# Patient Record
Sex: FEMALE | Race: BLACK OR AFRICAN AMERICAN | Employment: UNEMPLOYED | URBAN - METROPOLITAN AREA
[De-identification: names, ages, dates, MRNs, and addresses within clinical notes are randomized per-mention and may not be internally consistent; named-entity substitution may affect disease eponyms.]

---

## 2019-11-26 ENCOUNTER — OFFICE VISIT (OUTPATIENT)
Dept: PALLATIVE CARE | Age: 54
End: 2019-11-26
Payer: MEDICAID

## 2019-11-26 VITALS
HEART RATE: 89 BPM | TEMPERATURE: 98.1 F | DIASTOLIC BLOOD PRESSURE: 72 MMHG | SYSTOLIC BLOOD PRESSURE: 125 MMHG | RESPIRATION RATE: 18 BRPM | BODY MASS INDEX: 29.79 KG/M2 | WEIGHT: 157.8 LBS | OXYGEN SATURATION: 95 % | HEIGHT: 61 IN

## 2019-11-26 DIAGNOSIS — R63.4 WEIGHT LOSS: ICD-10-CM

## 2019-11-26 DIAGNOSIS — G89.3 PAIN, NEOPLASM-RELATED: ICD-10-CM

## 2019-11-26 DIAGNOSIS — C49.4 MALIGNANT NEOPLASM OF CONNECTIVE AND SOFT TISSUE OF ABDOMEN (HCC): Primary | ICD-10-CM

## 2019-11-26 DIAGNOSIS — K59.00 CONSTIPATION, UNSPECIFIED CONSTIPATION TYPE: ICD-10-CM

## 2019-11-26 DIAGNOSIS — Z51.5 PALLIATIVE CARE ENCOUNTER: ICD-10-CM

## 2019-11-26 DIAGNOSIS — R06.02 SOB (SHORTNESS OF BREATH): ICD-10-CM

## 2019-11-26 PROCEDURE — 99205 OFFICE O/P NEW HI 60 MIN: CPT | Performed by: NURSE PRACTITIONER

## 2019-11-26 PROCEDURE — G8419 CALC BMI OUT NRM PARAM NOF/U: HCPCS | Performed by: NURSE PRACTITIONER

## 2019-11-26 PROCEDURE — 1036F TOBACCO NON-USER: CPT | Performed by: NURSE PRACTITIONER

## 2019-11-26 PROCEDURE — G8484 FLU IMMUNIZE NO ADMIN: HCPCS | Performed by: NURSE PRACTITIONER

## 2019-11-26 PROCEDURE — 3017F COLORECTAL CA SCREEN DOC REV: CPT | Performed by: NURSE PRACTITIONER

## 2019-11-26 PROCEDURE — G8427 DOCREV CUR MEDS BY ELIG CLIN: HCPCS | Performed by: NURSE PRACTITIONER

## 2019-11-26 RX ORDER — HYDROCODONE BITARTRATE AND ACETAMINOPHEN 5; 325 MG/1; MG/1
1 TABLET ORAL EVERY 6 HOURS PRN
Qty: 120 TABLET | Refills: 0 | Status: CANCELLED | OUTPATIENT
Start: 2019-11-26 | End: 2019-12-26

## 2019-11-26 RX ORDER — AMLODIPINE BESYLATE 10 MG/1
10 TABLET ORAL DAILY
COMMUNITY

## 2019-11-26 RX ORDER — GLIPIZIDE 5 MG/1
5 TABLET ORAL DAILY
COMMUNITY

## 2019-11-26 RX ORDER — DOCUSATE SODIUM 100 MG/1
100 CAPSULE, LIQUID FILLED ORAL 2 TIMES DAILY
Qty: 60 CAPSULE | Refills: 0 | Status: SHIPPED | OUTPATIENT
Start: 2019-11-26 | End: 2020-02-18

## 2019-11-26 RX ORDER — ERGOCALCIFEROL 1.25 MG/1
50000 CAPSULE ORAL WEEKLY
COMMUNITY
End: 2020-02-25

## 2019-11-26 RX ORDER — MIRTAZAPINE 15 MG/1
15 TABLET, FILM COATED ORAL NIGHTLY
Qty: 30 TABLET | Refills: 3 | Status: SHIPPED | OUTPATIENT
Start: 2019-11-26 | End: 2020-01-22 | Stop reason: SDUPTHER

## 2019-11-26 RX ORDER — HYDROCODONE BITARTRATE AND ACETAMINOPHEN 5; 325 MG/1; MG/1
1 TABLET ORAL 2 TIMES DAILY PRN
Qty: 14 TABLET | Refills: 0 | Status: SHIPPED | OUTPATIENT
Start: 2019-11-26 | End: 2019-12-03

## 2019-11-26 RX ORDER — HYDROCHLOROTHIAZIDE 25 MG/1
25 TABLET ORAL DAILY
COMMUNITY
End: 2021-05-20

## 2019-11-26 RX ORDER — LOSARTAN POTASSIUM 50 MG/1
50 TABLET ORAL DAILY
COMMUNITY

## 2019-11-26 ASSESSMENT — ENCOUNTER SYMPTOMS
SORE THROAT: 0
VOMITING: 0
WHEEZING: 0
ABDOMINAL PAIN: 0
BACK PAIN: 0
NAUSEA: 0
CHEST TIGHTNESS: 0
DIARRHEA: 0
CONSTIPATION: 1
SINUS PAIN: 0
TROUBLE SWALLOWING: 0
SHORTNESS OF BREATH: 1
COUGH: 0

## 2019-12-03 ENCOUNTER — OFFICE VISIT (OUTPATIENT)
Dept: PALLATIVE CARE | Age: 54
End: 2019-12-03
Payer: MEDICAID

## 2019-12-03 VITALS
DIASTOLIC BLOOD PRESSURE: 82 MMHG | BODY MASS INDEX: 29.63 KG/M2 | TEMPERATURE: 98.4 F | WEIGHT: 156.8 LBS | RESPIRATION RATE: 20 BRPM | HEART RATE: 86 BPM | OXYGEN SATURATION: 97 % | SYSTOLIC BLOOD PRESSURE: 139 MMHG

## 2019-12-03 DIAGNOSIS — Z51.5 PALLIATIVE CARE ENCOUNTER: ICD-10-CM

## 2019-12-03 DIAGNOSIS — R06.02 SOB (SHORTNESS OF BREATH): Primary | ICD-10-CM

## 2019-12-03 PROCEDURE — G8427 DOCREV CUR MEDS BY ELIG CLIN: HCPCS | Performed by: NURSE PRACTITIONER

## 2019-12-03 PROCEDURE — G8419 CALC BMI OUT NRM PARAM NOF/U: HCPCS | Performed by: NURSE PRACTITIONER

## 2019-12-03 PROCEDURE — 3017F COLORECTAL CA SCREEN DOC REV: CPT | Performed by: NURSE PRACTITIONER

## 2019-12-03 PROCEDURE — G8484 FLU IMMUNIZE NO ADMIN: HCPCS | Performed by: NURSE PRACTITIONER

## 2019-12-03 PROCEDURE — 1036F TOBACCO NON-USER: CPT | Performed by: NURSE PRACTITIONER

## 2019-12-03 PROCEDURE — 99214 OFFICE O/P EST MOD 30 MIN: CPT | Performed by: NURSE PRACTITIONER

## 2019-12-03 RX ORDER — DOXYCYCLINE HYCLATE 100 MG/1
100 CAPSULE ORAL 2 TIMES DAILY
Qty: 20 CAPSULE | Refills: 0 | Status: SHIPPED | OUTPATIENT
Start: 2019-12-03 | End: 2019-12-13

## 2019-12-03 RX ORDER — PREDNISONE 10 MG/1
10 TABLET ORAL DAILY
Qty: 30 TABLET | Refills: 0 | Status: SHIPPED | OUTPATIENT
Start: 2019-12-03 | End: 2019-12-15

## 2019-12-03 ASSESSMENT — ENCOUNTER SYMPTOMS
DIARRHEA: 0
CHEST TIGHTNESS: 0
SINUS PAIN: 0
COUGH: 1
TROUBLE SWALLOWING: 0
SHORTNESS OF BREATH: 1
VOMITING: 0
ABDOMINAL PAIN: 0
NAUSEA: 0
SORE THROAT: 0
BACK PAIN: 0
CONSTIPATION: 0
WHEEZING: 0

## 2019-12-10 ENCOUNTER — OFFICE VISIT (OUTPATIENT)
Dept: PALLATIVE CARE | Age: 54
End: 2019-12-10
Payer: MEDICAID

## 2019-12-10 VITALS
BODY MASS INDEX: 28.97 KG/M2 | WEIGHT: 153.3 LBS | TEMPERATURE: 98.1 F | DIASTOLIC BLOOD PRESSURE: 79 MMHG | SYSTOLIC BLOOD PRESSURE: 137 MMHG | HEART RATE: 84 BPM | OXYGEN SATURATION: 99 % | RESPIRATION RATE: 20 BRPM

## 2019-12-10 DIAGNOSIS — Z51.5 PALLIATIVE CARE ENCOUNTER: ICD-10-CM

## 2019-12-10 DIAGNOSIS — R06.02 SOB (SHORTNESS OF BREATH): Primary | ICD-10-CM

## 2019-12-10 DIAGNOSIS — C49.4 MALIGNANT NEOPLASM OF CONNECTIVE AND SOFT TISSUE OF ABDOMEN (HCC): ICD-10-CM

## 2019-12-10 DIAGNOSIS — R63.4 WEIGHT LOSS: ICD-10-CM

## 2019-12-10 DIAGNOSIS — G89.3 PAIN, NEOPLASM-RELATED: ICD-10-CM

## 2019-12-10 PROCEDURE — 99214 OFFICE O/P EST MOD 30 MIN: CPT | Performed by: NURSE PRACTITIONER

## 2019-12-10 PROCEDURE — G8484 FLU IMMUNIZE NO ADMIN: HCPCS | Performed by: NURSE PRACTITIONER

## 2019-12-10 PROCEDURE — 3017F COLORECTAL CA SCREEN DOC REV: CPT | Performed by: NURSE PRACTITIONER

## 2019-12-10 PROCEDURE — G8427 DOCREV CUR MEDS BY ELIG CLIN: HCPCS | Performed by: NURSE PRACTITIONER

## 2019-12-10 PROCEDURE — 1036F TOBACCO NON-USER: CPT | Performed by: NURSE PRACTITIONER

## 2019-12-10 PROCEDURE — G8419 CALC BMI OUT NRM PARAM NOF/U: HCPCS | Performed by: NURSE PRACTITIONER

## 2019-12-10 RX ORDER — HYDROCODONE BITARTRATE AND ACETAMINOPHEN 5; 325 MG/1; MG/1
1 TABLET ORAL 2 TIMES DAILY PRN
Qty: 28 TABLET | Refills: 0 | Status: SHIPPED | OUTPATIENT
Start: 2019-12-10 | End: 2019-12-24

## 2019-12-10 RX ORDER — POTASSIUM CHLORIDE 20 MEQ/1
20 TABLET, EXTENDED RELEASE ORAL
COMMUNITY
Start: 2019-09-06

## 2019-12-10 ASSESSMENT — ENCOUNTER SYMPTOMS
TROUBLE SWALLOWING: 0
SHORTNESS OF BREATH: 1
SORE THROAT: 0
NAUSEA: 0
BACK PAIN: 0
SINUS PAIN: 0
DIARRHEA: 0
COUGH: 1
VOMITING: 0
WHEEZING: 0
CHEST TIGHTNESS: 0
CONSTIPATION: 0
ABDOMINAL PAIN: 0

## 2019-12-31 ENCOUNTER — OFFICE VISIT (OUTPATIENT)
Dept: PALLATIVE CARE | Age: 54
End: 2019-12-31
Payer: MEDICAID

## 2019-12-31 VITALS
OXYGEN SATURATION: 97 % | BODY MASS INDEX: 28.91 KG/M2 | TEMPERATURE: 98.2 F | WEIGHT: 153 LBS | DIASTOLIC BLOOD PRESSURE: 80 MMHG | SYSTOLIC BLOOD PRESSURE: 144 MMHG | HEART RATE: 78 BPM | RESPIRATION RATE: 18 BRPM

## 2019-12-31 DIAGNOSIS — Z51.5 PALLIATIVE CARE ENCOUNTER: ICD-10-CM

## 2019-12-31 DIAGNOSIS — C49.4 MALIGNANT NEOPLASM OF CONNECTIVE AND SOFT TISSUE OF ABDOMEN (HCC): ICD-10-CM

## 2019-12-31 DIAGNOSIS — R06.02 SOB (SHORTNESS OF BREATH): ICD-10-CM

## 2019-12-31 DIAGNOSIS — G89.3 PAIN, NEOPLASM-RELATED: Primary | ICD-10-CM

## 2019-12-31 PROCEDURE — 99214 OFFICE O/P EST MOD 30 MIN: CPT | Performed by: NURSE PRACTITIONER

## 2019-12-31 PROCEDURE — G8419 CALC BMI OUT NRM PARAM NOF/U: HCPCS | Performed by: NURSE PRACTITIONER

## 2019-12-31 PROCEDURE — 1036F TOBACCO NON-USER: CPT | Performed by: NURSE PRACTITIONER

## 2019-12-31 PROCEDURE — 3017F COLORECTAL CA SCREEN DOC REV: CPT | Performed by: NURSE PRACTITIONER

## 2019-12-31 PROCEDURE — G8484 FLU IMMUNIZE NO ADMIN: HCPCS | Performed by: NURSE PRACTITIONER

## 2019-12-31 PROCEDURE — G8428 CUR MEDS NOT DOCUMENT: HCPCS | Performed by: NURSE PRACTITIONER

## 2019-12-31 RX ORDER — HYDROCODONE BITARTRATE AND ACETAMINOPHEN 5; 325 MG/1; MG/1
1 TABLET ORAL 2 TIMES DAILY PRN
Qty: 28 TABLET | Refills: 0 | Status: SHIPPED | OUTPATIENT
Start: 2019-12-31 | End: 2020-01-14

## 2020-01-16 ENCOUNTER — HOSPITAL ENCOUNTER (OUTPATIENT)
Dept: RADIATION ONCOLOGY | Age: 55
Discharge: HOME | End: 2020-01-16

## 2020-01-16 ENCOUNTER — HOSPITAL ENCOUNTER (OUTPATIENT)
Dept: RADIATION ONCOLOGY | Age: 55
End: 2020-01-16
Attending: RADIOLOGY

## 2020-01-16 VITALS
HEART RATE: 85 BPM | HEIGHT: 61 IN | RESPIRATION RATE: 16 BRPM | OXYGEN SATURATION: 97 % | SYSTOLIC BLOOD PRESSURE: 135 MMHG | TEMPERATURE: 97.2 F | WEIGHT: 149.8 LBS | DIASTOLIC BLOOD PRESSURE: 80 MMHG | BODY MASS INDEX: 28.28 KG/M2

## 2020-01-16 PROCEDURE — 77332 RADIATION TREATMENT AID(S): CPT | Performed by: RADIOLOGY

## 2020-01-16 PROCEDURE — 99214 OFFICE O/P EST MOD 30 MIN: CPT | Performed by: RADIOLOGY

## 2020-01-16 PROCEDURE — 77290 THER RAD SIMULAJ FIELD CPLX: CPT | Performed by: RADIOLOGY

## 2020-01-16 PROCEDURE — 77334 RADIATION TREATMENT AID(S): CPT | Performed by: RADIOLOGY

## 2020-01-16 RX ORDER — IPRATROPIUM BROMIDE AND ALBUTEROL SULFATE 2.5; .5 MG/3ML; MG/3ML
1 SOLUTION RESPIRATORY (INHALATION) EVERY 4 HOURS
COMMUNITY

## 2020-01-16 RX ORDER — HYDROCODONE BITARTRATE AND ACETAMINOPHEN 5; 325 MG/1; MG/1
1 TABLET ORAL 2 TIMES DAILY PRN
COMMUNITY
End: 2020-01-22 | Stop reason: SDUPTHER

## 2020-01-16 NOTE — PROGRESS NOTES
NADEGE referred to meet Pt via Dr. Krishna Garcia for possible transportation challenges. Pt has [employer]/Medicaid dual coverage. She is aware of transportation options through her insurance and how to connect to these resources (she recites need for 72 hour notice and possibility that the resources are capped at 15 rides). NADEGE encouraged her to contact [employer] and inform them that she is needing 30 treatments and request an extension on the maximum ride limit. NADEGE stated should the 15 ride max be enforced, a SW here at the cancer center will connect her to [facility] transportation dept. for the remainder of her rides. NADEGE inquired re: support system; here with fijuan today. Her finance brought her to this santiago't (he resides in the same building as herself) and Pt states he is able to bring her to the santiago'ts when chemotherapy and radiation are scheduled on the same day (once per week), so she does not have to arrange two transports for that day. She states her fiance and friends are a back-up for her transportation needs as she states adamantly that she does not want to miss any santiago'ts. Pt is no longer connected to [facility] (states a history of Bi-polar disorder, which she states has learned to manage without medications); ending approx. 5 years ago. There are no further needs at this time. NADEGE provided business card contact should future needs arise.

## 2020-01-16 NOTE — H&P
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 Current imaging (PETCT and MRI) brain indicate localized disease in the DARSHANA, inoperable due to location. Dear Dr Anaya Needs: here today for radiation recommendations    HPI: I was asked by Dr. Crystal Ch to see this 53 y.o. female who was found to have a rapidly enlarging mass on the anterior abdominal wall which was resected 6/11/19. Pathology showed poorly differentiated carcinoma with squamous features on 6/11/19. Pt presents today, >4 months later, after recent hospitalization at [facility] for chest pain. She denied SOB BRYANT weight loss and heoptysis. Initial CXR showed a L suprahilar mass and CT chest obtained on 11//19 showed an approximately 10cm L upper chest mass with associated encasement and narrowing of L hilar vessels and central airways c/w malignancy/bronchogenic carcinoma until proven otherwise. Small L pleural effusion. \" No evidence of metastatic disease on CT a/p except for trace nonspecific fluid in the pelvis.    CT angiogram 11/27/19:  Images Reviewed:  CT with contrast of the chest at an outside facility   performed on 11/27/2019 12:00 AM  Image Quality:  technically adequate images    Overread Date:   12/2/2019 9:10 AM  MQ:  Over_2B  Comparison:  None available      RESULT:    Limitations:  There is moderately good, but suboptimal opacification of   the pulmonary arterial vasculature.  Within this limitation --    Evaluation for thromboembolic disease:       - Right heart chambers:  No thromboembolic disease.       - Main pulmonary arteries:  No thromboembolic disease.       - Lobar pulmonary arteries:  No thromboembolic disease.       - Segmental pulmonary arteries:  No thromboembolic disease.       - Subsegmental pulmonary arteries:  Nondiagnostic secondary to   suboptimal opacification. Other pulmonary artery comments: There is concentric narrowing and   occlusion of the left upper lobe pulmonary artery and the apicoposterior   and anterior segmental arteries of the left upper lobe secondary to the   presence of a left perihilar soft tissue mass.  The main pulmonary artery   is borderline dilated, a finding which may be associated with pulmonary   hypertension.  The main pulmonary artery measures 30 mm in maximum   transverse diameter. Lines, tubes, and devices:  None. Lung parenchyma and pleura: The trachea and central airways of the right   lung appear patent and devoid of endobronchial lesion. Cata Ochoa is an 83   (AP) x 71 (trans-) x 68 (long) mm heterogeneously attenuating soft tissue   mass centered in the left perihilar region.  This mass most likely   represents a primary bronchogenic neoplasm and is associated with   narrowing or occlusion of the left upper lobe pulmonary artery just   distal to its origin (6:90) and occlusion of the left upper lobe bronchus   (8:76).  The mass effaces the left lower lobe pulmonary artery (6:90) and   likely extends directly into the mediastinal soft tissues. Cata Ochoa is also   a probable neoplastic 20 x 16 mm DARSHANA indeterminate nodule (7:55).    Interlobular septal thickening and mild reticulation in the left upper   lobe peripheral to the central mass is likely secondary to   postobstructive changes and passive venous congestion.  There is a small   low-density left pleural effusion which layers dependently. Cata [place]mundoor is   associated dependent atelectasis in the left lower lobe.  A few small (<6   mm) indeterminate nodules are scattered in the right lung (for example,   RUL, 7:75 and 82 and RLL, 7:142). Cata [place]mund[place] is mosaic attenuation of the   aerated lung parenchyma which may be secondary to nonspecific small   airways or small vessel disease.  No pneumothorax is identified.     Thoracic inlet, heart, and mediastinum: A nonspecific lymph node which   measures 8 mm in short axis diameter is present in the left lower   cervical/supraclavicular region (6:11).  The visualized thyroid gland   appears unremarkable. There is left axillary lymphadenopathy, suspicious for neoplastic   involvement.  The largest left axillary lymph node measures 12 mm in   short axis diameter (6:78).  There is left hilar (6:115) and subcarinal   lymphadenopathy. The thoracic aorta appears normal in course and caliber. Neo Ethel is a   common origin of the brachiocephalic and left common carotid arteries, a   normal anatomic variant.  There are mild atherosclerotic calcifications   of the thoracic aorta and the left coronary artery circulation.  No   specific cardiac chamber enlargement is identified. Neo Ethel is no   pericardial effusion.  The esophagus appears nondilated. Bones and soft tissues:  The vertebral body heights appear symmetric and   well-maintained.  No lytic or destructive osseous lesion is identified.    An 8 x 6 mm soft tissue nodule which may be metastatic in nature is noted   in the left lateral chest wall (6:125). Upper abdomen:  CT examination of the abdomen and pelvis has been   interpreted separately. QB_N        IMPRESSION:    1.  No spiral CT evidence of pulmonary thromboembolism.  This assessment   is limited by concentric narrowing and occlusion of the left upper lobe   pulmonary artery and its apicoposterior and anterior segmental branches   secondary to the presence of a left perihilar soft tissue mass. 2. There is a large, heterogeneously attenuating soft tissue mass   centered in the left perihilar region, mostly representing a primary   bronchogenic neoplasm. 3. A probable neoplastic soft tissue nodule is also identified in the   periphery left upper lobe.     4. A few small (<6 mm) indeterminate nodules are scattered in the right   lung.  Inflammatory versus neoplastic etiologies are the most common   diagnostic considerations for is normal. No significant marrow replacement process. Vasculature:    Major intracranial arterial structures, and dural venous   sinuses show typical flow void, suggesting patency by spin echo criteria. Other:  The visualized paranasal sinuses and mastoid air cells are clear. The orbits and extracranial soft tissues are unremarkable. IMPRESSION  IMPRESSION:    No intracranial metastatic disease. Partially empty sella turcica is a nonspecific finding and can be seen in   asymptomatic patients as well as patients with increased intracranial   pressure. PETCT 12/6/19: IMPRESSION:    Head and Neck:  *  No hypermetabolic foci    Chest:  *  Hypermetabolic left perihilar centrally necrotic mass with invasion   into the mediastinum, as described  *  Left upper lobe nodular opacities distal to the mass favored to be   postoperative changes, although superimposed neoplastic process is not   excluded. Abdomen and pelvis:  *  No evidence of FDG avid neoplastic process    Musculoskeletal:  *  No neoplastic hypermetabolic lesions    Bronchoscopy 12/24/19 with EBUS:  Lymph Nodes: The following lymph nodes were evaluated and/or sampled.       Lymph node sizing was performed via endobronchial ultrasound for       suspected non-small cell lung cancer. Sampling by transbronchial       needle aspiration was also performed using an Olympus EBUS-TBNA 22       gauge needle and sent for routine cytology.       - The 11Rs (superior interlobar) node was 5.4 mm by EBUS. Four       samples with the needle were obtained.       - The 11 Ri (inferior interlobar) node was 3.9 and 2.6 mm by EBUS.     Sampling was not done due to size criteria (less than 5 mm).     - The 4R (lower paratracheal) node was 1.9 mm, 3.3 mm and 7.2 mm by       EBUS. Five samples with the needle were obtained.       - The 2R (upper paratracheal) node was 8.2 mm by EBUS.  Four samples       with the needle were obtained.       - The 7 (subcarinal) partially occludes the airway.                        - Lymph node sizing and sampling was performed.                        - Left upper lobe mass sizing and sampling was                        performed. Final pathology is similar to abdomin al wall pathology:  Soft tissue, abdominal wall, excision - Poorly differentiated carcinoma   with squamous features. BPR/AM/lbk 2019     COMMENT   The histologic sections show a poorly differentiated carcinoma. Immunohistochemical stains performed on the [facility] on block A2   shows strong and diffuse reactivity for AE1/3 and p40 while TTF-1, PAX8,   GATA3, AND CDX2 are negative. Chromogenic in situ hybridization studies for   high-risk HPV are negative. Dr Shraddha Valdes discussed her case in tumor board and definitive CRT was recommended as she is not an operative candidate due to tumor location. She is here today for radiation recommendations. PAST MEDICAL HISTORY:   Past Medical History:   Diagnosis Date    Abnormal Pap smear of cervix     Asthma     Bacterial vaginosis     Bipolar 1 disorder ([address]     CAD (coronary artery disease)     COPD (chronic obstructive pulmonary disease) (HCC)     chronic bronchitis    Diabetes [facility]     Ectopic pregnancy     Hypertension     Lung cancer [facility]     Obesity     Schizophrenia ([facility]     Type 2 diabetes mellitus without complication ([facility]        PAST SURGICAL HISTORY:  Past Surgical History:   Procedure Laterality Date    ABDOMEN SURGERY       SECTION      CHOLECYSTECTOMY      DILATION AND CURETTAGE OF UTERUS      TUBAL LIGATION      UMBILICAL HERNIA REPAIR          ALLERGIES:   Allergies   Allergen Reactions    Morphine Anaphylaxis    Demerol Hcl [Meperidine] Itching       CURRENT MEDICATIONS:   Prior to Admission medications    Medication Sig Start Date End Date Taking?  Authorizing Provider   HYDROcodone-acetaminophen (NORCO) 5-325 MG per tablet Take 1 tablet by mouth 2 times daily as needed for Pain. Yes Historical Provider, MD   ipratropium-albuterol (DUONEB) 0.5-2.5 (3) MG/3ML SOLN nebulizer solution Inhale 1 vial into the lungs every 4 hours   Yes Historical Provider, MD   potassium chloride (KLOR-CON M) 20 MEQ extended release tablet Take 20 mEq by mouth 19  Yes Historical Provider, MD   hydrochlorothiazide (HYDRODIURIL) 25 MG tablet Take 25 mg by mouth daily   Yes Historical Provider, MD   vitamin D (ERGOCALCIFEROL) 1.25 MG (13687 UT) CAPS capsule Take 50,000 Units by mouth once a week   Yes Historical Provider, MD   amLODIPine (NORVASC) 10 MG tablet Take 10 mg by mouth daily   Yes Historical Provider, MD   glipiZIDE (GLUCOTROL) 5 MG tablet Take 5 mg by mouth daily   Yes Historical Provider, MD   losartan (COZAAR) 50 MG tablet Take 50 mg by mouth daily   Yes Historical Provider, MD   mirtazapine (REMERON) 15 MG tablet Take 1 tablet by mouth nightly 19  Yes REINA Arboleda NP   docusate sodium (COLACE) 100 MG capsule Take 1 capsule by mouth 2 times daily 19  Yes REINA Arboleda NP       I have personally reviewed and reconciled the medications listed.     FAMILY HISTORY:   Family History   Problem Relation Age of Onset    Diabetes Mother     High Blood Pressure Mother     Coronary Art Dis Father     High Blood Pressure Father     Diabetes Father        SOCIAL HISTORY:   Social History     Tobacco Use   Smoking Status Former Smoker    Packs/day: 1.50    Years: 38.00    Pack years: 57.00    Types: Cigarettes    Start date:     Last attempt to quit: 2019    Years since quittin.2   Smokeless Tobacco Never Used     Social History     Substance and Sexual Activity   Alcohol Use Not Currently       Review Of Systems:   Pain Score:   Pain Score (1-10): 0 now   Pain Location: mid chest  Pain Duration: usually in the evening  Pain Management/Control: norco at night    Is pain affecting your ability to take care of yourself or move enlarged    CARDIOVASCULAR:  Normal apical impulse, regular rate and rhythm, normal S1 and S2, no S3 or S4, and no murmur noted    LUNGS:  No increased work of breathing, good air exchange, decreased BS DARSHANA, no crackles or wheezing    ABDOMEN:  Nontender, nondistended, normal bowel sounds, soft, no masses, no hepatosplenomegally/ Large healed anterior abdominal scar, no masses    EXTREMITIES: No cyanosis clubbing or edema    MUSCULOSKELETAL: No bony tenderness along the hips ribs or spine. Motor strength is 5 out of 5 all extremities bilaterally. Tone is normal.    NEUROLOGIC:  Awake, alert, oriented x 3. Nonfocal    SKIN:  no bruising or bleeding, normal skin color, texture, turgor, no redness, warmth, or swelling, no rashes, no lesions, no abnormal moles and no jaundice      STUDIES: I have personally reviewed the imaging, laboratory, and pathology studies as previously described. 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 Current imaging (PETCT and MRI) brain indicate localized disease in the DARSHANA, inoperable due to location. 10 cm L suprahilar mass poorly differentiated carcinoma squamous features encasing vessels and airway. Strongly PDL1 positive. Discussed in [facility] tumor board with recommendations for definitive CRT. Discussed with [facility] Tayo Walker at [facility] and we will begin concurrent CRT 1/27/20. 60Gy 30 fx. Patient has [place] [facility] and will need to coordinate all appointments and treatments with [facility] service. Intent of treatment, potential risks benefits and side effects reviewed today. Thank you for this consult and allowing us to participate in the care of this patient.   Sincerely,    Electronically signed by Adriana Anderson MD on 1/16/20 at 2:15 PM      cc:   No att. providers found  MD Gino Hampton MD [place] [facility]

## 2020-01-22 ENCOUNTER — OFFICE VISIT (OUTPATIENT)
Dept: PALLATIVE CARE | Age: 55
End: 2020-01-22
Payer: COMMERCIAL

## 2020-01-22 PROCEDURE — 3017F COLORECTAL CA SCREEN DOC REV: CPT | Performed by: NURSE PRACTITIONER

## 2020-01-22 PROCEDURE — G8419 CALC BMI OUT NRM PARAM NOF/U: HCPCS | Performed by: NURSE PRACTITIONER

## 2020-01-22 PROCEDURE — 1036F TOBACCO NON-USER: CPT | Performed by: NURSE PRACTITIONER

## 2020-01-22 PROCEDURE — G8484 FLU IMMUNIZE NO ADMIN: HCPCS | Performed by: NURSE PRACTITIONER

## 2020-01-22 PROCEDURE — 99348 HOME/RES VST EST LOW MDM 30: CPT | Performed by: NURSE PRACTITIONER

## 2020-01-22 RX ORDER — HYDROCODONE BITARTRATE AND ACETAMINOPHEN 5; 325 MG/1; MG/1
1 TABLET ORAL 2 TIMES DAILY PRN
Qty: 60 TABLET | Refills: 0 | Status: SHIPPED | OUTPATIENT
Start: 2020-01-22 | End: 2020-02-05 | Stop reason: ALTCHOICE

## 2020-01-22 RX ORDER — MIRTAZAPINE 15 MG/1
15 TABLET, FILM COATED ORAL NIGHTLY
Qty: 30 TABLET | Refills: 3 | Status: SHIPPED | OUTPATIENT
Start: 2020-01-22 | End: 2020-02-19 | Stop reason: SDUPTHER

## 2020-01-22 ASSESSMENT — ENCOUNTER SYMPTOMS
SINUS PAIN: 0
NAUSEA: 0
TROUBLE SWALLOWING: 0
BACK PAIN: 0
ABDOMINAL PAIN: 1
COUGH: 1
VOMITING: 0
SHORTNESS OF BREATH: 1
WHEEZING: 0
CONSTIPATION: 0
CHEST TIGHTNESS: 0
DIARRHEA: 0
SORE THROAT: 0

## 2020-01-22 NOTE — PROGRESS NOTES
Marital status:       Spouse name: Not on file    Number of children: Not on file    Years of education: Not on file    Highest education level: Not on file   Occupational History    Not on file   Social Needs    Financial resource strain: Not on file    Food insecurity:     Worry: Not on file     Inability: Not on file    Transportation needs:     Medical: Not on file     Non-medical: Not on file   Tobacco Use    Smoking status: Former Smoker     Packs/day: 1.50     Years: 38.00     Pack years: 57.00     Types: Cigarettes     Start date:      Last attempt to quit: 2019     Years since quittin.2    Smokeless tobacco: Never Used   Substance and Sexual Activity    Alcohol use: Not Currently    Drug use: Not Currently    Sexual activity: Yes   Lifestyle    Physical activity:     Days per week: Not on file     Minutes per session: Not on file    Stress: Not on file   Relationships    Social connections:     Talks on phone: Not on file     Gets together: Not on file     Attends Zoroastrian service: Not on file     Active member of club or organization: Not on file     Attends meetings of clubs or organizations: Not on file     Relationship status: Not on file    Intimate partner violence:     Fear of current or ex partner: Not on file     Emotionally abused: Not on file     Physically abused: Not on file     Forced sexual activity: Not on file   Other Topics Concern    Not on file   Social History Narrative    Not on file     Family History   Problem Relation Age of Onset    Diabetes Mother     High Blood Pressure Mother     Coronary Art Dis Father     High Blood Pressure Father     Diabetes Father      Allergies   Allergen Reactions    Morphine Anaphylaxis    Demerol Hcl [Meperidine] Itching     Current Outpatient Medications on File Prior to Visit   Medication Sig Dispense Refill    ipratropium-albuterol (DUONEB) 0.5-2.5 (3) MG/3ML SOLN nebulizer solution Inhale 1 vial into the lungs every 4 hours      potassium chloride (KLOR-CON M) 20 MEQ extended release tablet Take 20 mEq by mouth      hydrochlorothiazide (HYDRODIURIL) 25 MG tablet Take 25 mg by mouth daily      vitamin D (ERGOCALCIFEROL) 1.25 MG (84730 UT) CAPS capsule Take 50,000 Units by mouth once a week      amLODIPine (NORVASC) 10 MG tablet Take 10 mg by mouth daily      glipiZIDE (GLUCOTROL) 5 MG tablet Take 5 mg by mouth daily      losartan (COZAAR) 50 MG tablet Take 50 mg by mouth daily      docusate sodium (COLACE) 100 MG capsule Take 1 capsule by mouth 2 times daily 60 capsule 0     No current facility-administered medications on file prior to visit. Review of Systems   Constitutional: Negative for chills, fatigue, fever and unexpected weight change. HENT: Negative for congestion, mouth sores, sinus pain, sore throat and trouble swallowing. Respiratory: Positive for cough and shortness of breath. Negative for chest tightness and wheezing. Cardiovascular: Negative for chest pain, palpitations and leg swelling. Gastrointestinal: Positive for abdominal pain. Negative for constipation, diarrhea, nausea and vomiting. Endocrine: Negative for polydipsia, polyphagia and polyuria. Genitourinary: Negative for dysuria, flank pain, frequency and urgency. Musculoskeletal: Negative for arthralgias, back pain, gait problem, joint swelling and myalgias. Left side of chest wall pain   Skin: Negative. Neurological: Negative for tremors, seizures, speech difficulty, weakness, numbness and headaches. Psychiatric/Behavioral: Negative for agitation, confusion, sleep disturbance and suicidal ideas. The patient is not nervous/anxious. Objective: There were no vitals taken for this visit.    Wt Readings from Last 3 Encounters:   01/16/20 149 lb 12.8 oz (67.9 kg)   12/31/19 153 lb (69.4 kg)   12/10/19 153 lb 4.8 oz (69.5 kg)     Physical Exam  Constitutional:       Appearance: She is

## 2020-01-23 PROCEDURE — 77301 RADIOTHERAPY DOSE PLAN IMRT: CPT | Performed by: RADIOLOGY

## 2020-01-23 PROCEDURE — 77338 DESIGN MLC DEVICE FOR IMRT: CPT | Performed by: RADIOLOGY

## 2020-01-23 PROCEDURE — 77300 RADIATION THERAPY DOSE PLAN: CPT | Performed by: RADIOLOGY

## 2020-01-23 PROCEDURE — 77293 RESPIRATOR MOTION MGMT SIMUL: CPT | Performed by: RADIOLOGY

## 2020-01-24 ENCOUNTER — HOSPITAL ENCOUNTER (OUTPATIENT)
Dept: RADIATION ONCOLOGY | Age: 55
Discharge: HOME | End: 2020-01-24
Attending: RADIOLOGY

## 2020-01-24 PROCEDURE — 77470 SPECIAL RADIATION TREATMENT: CPT | Performed by: RADIOLOGY

## 2020-01-27 ENCOUNTER — HOSPITAL ENCOUNTER (OUTPATIENT)
Dept: RADIATION ONCOLOGY | Age: 55
Discharge: HOME | End: 2020-01-27
Attending: RADIOLOGY

## 2020-01-27 ENCOUNTER — APPOINTMENT (OUTPATIENT)
Dept: RADIATION ONCOLOGY | Age: 55
End: 2020-01-27
Attending: RADIOLOGY

## 2020-01-27 PROCEDURE — 77386 HC NTSTY MODUL RAD TX DLVR CPLX: CPT | Performed by: RADIOLOGY

## 2020-01-28 ENCOUNTER — HOSPITAL ENCOUNTER (OUTPATIENT)
Dept: RADIATION ONCOLOGY | Age: 55
Discharge: HOME | End: 2020-01-28
Attending: RADIOLOGY

## 2020-01-28 PROCEDURE — 99212 OFFICE O/P EST SF 10 MIN: CPT | Performed by: RADIOLOGY

## 2020-01-28 PROCEDURE — 77386 HC NTSTY MODUL RAD TX DLVR CPLX: CPT | Performed by: RADIOLOGY

## 2020-01-28 RX ORDER — ONDANSETRON HYDROCHLORIDE 8 MG/1
8 TABLET, FILM COATED ORAL
COMMUNITY
End: 2020-08-12 | Stop reason: SDUPTHER

## 2020-01-28 RX ORDER — PROCHLORPERAZINE MALEATE 10 MG
10 TABLET ORAL EVERY 6 HOURS PRN
COMMUNITY
End: 2021-05-20

## 2020-01-29 ENCOUNTER — APPOINTMENT (OUTPATIENT)
Dept: RADIATION ONCOLOGY | Age: 55
End: 2020-01-29
Attending: RADIOLOGY

## 2020-01-29 ENCOUNTER — HOSPITAL ENCOUNTER (OUTPATIENT)
Dept: RADIATION ONCOLOGY | Age: 55
Discharge: HOME | End: 2020-01-29
Attending: RADIOLOGY

## 2020-01-29 PROCEDURE — 77386 HC NTSTY MODUL RAD TX DLVR CPLX: CPT | Performed by: RADIOLOGY

## 2020-01-30 ENCOUNTER — APPOINTMENT (OUTPATIENT)
Dept: RADIATION ONCOLOGY | Age: 55
End: 2020-01-30
Attending: RADIOLOGY

## 2020-01-30 ENCOUNTER — HOSPITAL ENCOUNTER (OUTPATIENT)
Dept: RADIATION ONCOLOGY | Age: 55
Discharge: HOME | End: 2020-01-30
Attending: RADIOLOGY

## 2020-01-30 PROCEDURE — 77386 HC NTSTY MODUL RAD TX DLVR CPLX: CPT | Performed by: RADIOLOGY

## 2020-01-31 ENCOUNTER — HOSPITAL ENCOUNTER (OUTPATIENT)
Dept: RADIATION ONCOLOGY | Age: 55
Discharge: HOME | End: 2020-01-31
Attending: RADIOLOGY

## 2020-01-31 PROCEDURE — 77336 RADIATION PHYSICS CONSULT: CPT | Performed by: RADIOLOGY

## 2020-01-31 PROCEDURE — 77338 DESIGN MLC DEVICE FOR IMRT: CPT | Performed by: RADIOLOGY

## 2020-01-31 PROCEDURE — 77386 HC NTSTY MODUL RAD TX DLVR CPLX: CPT | Performed by: RADIOLOGY

## 2020-01-31 PROCEDURE — 77293 RESPIRATOR MOTION MGMT SIMUL: CPT | Performed by: RADIOLOGY

## 2020-01-31 PROCEDURE — 77301 RADIOTHERAPY DOSE PLAN IMRT: CPT | Performed by: RADIOLOGY

## 2020-01-31 PROCEDURE — 77300 RADIATION THERAPY DOSE PLAN: CPT | Performed by: RADIOLOGY

## 2020-02-03 ENCOUNTER — HOSPITAL ENCOUNTER (OUTPATIENT)
Dept: RADIATION ONCOLOGY | Age: 55
Discharge: HOME | End: 2020-02-03
Attending: RADIOLOGY
Payer: COMMERCIAL

## 2020-02-03 PROCEDURE — 77386 HC NTSTY MODUL RAD TX DLVR CPLX: CPT | Performed by: RADIOLOGY

## 2020-02-04 ENCOUNTER — HOSPITAL ENCOUNTER (OUTPATIENT)
Dept: RADIATION ONCOLOGY | Age: 55
Discharge: HOME | End: 2020-02-04
Attending: RADIOLOGY
Payer: COMMERCIAL

## 2020-02-04 PROCEDURE — 99213 OFFICE O/P EST LOW 20 MIN: CPT | Performed by: RADIOLOGY

## 2020-02-04 PROCEDURE — 77386 HC NTSTY MODUL RAD TX DLVR CPLX: CPT | Performed by: RADIOLOGY

## 2020-02-05 ENCOUNTER — APPOINTMENT (OUTPATIENT)
Dept: RADIATION ONCOLOGY | Age: 55
End: 2020-02-05
Attending: RADIOLOGY
Payer: COMMERCIAL

## 2020-02-05 ENCOUNTER — OFFICE VISIT (OUTPATIENT)
Dept: PALLATIVE CARE | Age: 55
End: 2020-02-05
Payer: COMMERCIAL

## 2020-02-05 VITALS — DIASTOLIC BLOOD PRESSURE: 82 MMHG | SYSTOLIC BLOOD PRESSURE: 135 MMHG | HEART RATE: 85 BPM | TEMPERATURE: 98.8 F

## 2020-02-05 PROCEDURE — G8419 CALC BMI OUT NRM PARAM NOF/U: HCPCS | Performed by: NURSE PRACTITIONER

## 2020-02-05 PROCEDURE — 3017F COLORECTAL CA SCREEN DOC REV: CPT | Performed by: NURSE PRACTITIONER

## 2020-02-05 PROCEDURE — 99348 HOME/RES VST EST LOW MDM 30: CPT | Performed by: NURSE PRACTITIONER

## 2020-02-05 PROCEDURE — 1036F TOBACCO NON-USER: CPT | Performed by: NURSE PRACTITIONER

## 2020-02-05 PROCEDURE — 77386 HC NTSTY MODUL RAD TX DLVR CPLX: CPT | Performed by: RADIOLOGY

## 2020-02-05 PROCEDURE — G8484 FLU IMMUNIZE NO ADMIN: HCPCS | Performed by: NURSE PRACTITIONER

## 2020-02-05 ASSESSMENT — ENCOUNTER SYMPTOMS
COUGH: 1
CHEST TIGHTNESS: 0
WHEEZING: 0
DIARRHEA: 0
NAUSEA: 0
SORE THROAT: 0
CONSTIPATION: 0
TROUBLE SWALLOWING: 0
VOMITING: 0
ABDOMINAL PAIN: 1
BACK PAIN: 1
SINUS PAIN: 0
SHORTNESS OF BREATH: 1

## 2020-02-05 NOTE — PROGRESS NOTES
Subjective:     ID: Patient was seen in [facility] for symptom management and goals of care discussion. Patient was accompanied by : self  Chief Complaint   Patient presents with    Pain    Shortness of Breath        HPI       Lida Lucero is a 53year old female whom was seen today for symptom management and treatment of pain and decreased appetite , lung cancer. Home visit is necessary in lieu of office due to significant frailty and high symptom burden from comorbid illnesses. Patient presents today alert and in NAD. She reports no pain at this time , however she took recently a pain medication , which is effective. Patient reports increased pain about 7/10 after chemo/radiation therapy mostly on the left side of chest and back. Patient reports SOB with ambulation/exertion. States that she has a dry cough, negative for fever, chills, night sweats. Positive for occasional nausea, negative for vomiting. Last BM 2 days ago. Patient able to ambulate without assist. Noted weight loss about 6-7 lbs in the last month. Patient reports occasional anxiety, however she reports as able to manage it so far. Negative for any suicidal/homicidal ideation. Past Medical History:   Diagnosis Date    Abnormal Pap smear of cervix     Asthma     Bacterial vaginosis     Bipolar 1 disorder ([address]     CAD (coronary artery disease)     COPD (chronic obstructive pulmonary disease) (HCC)     chronic bronchitis    Diabetes ([address]     Ectopic pregnancy     Hypertension     Lung cancer ([address]     Obesity     [facility]     Type 2 diabetes mellitus without complication ([facility]      Past Surgical History:   Procedure Laterality Date    ABDOMEN SURGERY       SECTION      CHOLECYSTECTOMY      DILATION AND CURETTAGE OF UTERUS      TUBAL LIGATION      UMBILICAL HERNIA REPAIR       Social History     Socioeconomic History    Marital status:       Spouse name: Not on file    Number of children: Not on file    Years of education: Not on file    Highest education level: Not on file   Occupational History    Not on file   Social Needs    Financial resource strain: Not on file    Food insecurity:     Worry: Not on file     Inability: Not on file    Transportation needs:     Medical: Not on file     Non-medical: Not on file   Tobacco Use    Smoking status: Former Smoker     Packs/day: 1.50     Years: 38.00     Pack years: 57.00     Types: Cigarettes     Start date:      Last attempt to quit: 2019     Years since quittin.2    Smokeless tobacco: Never Used   Substance and Sexual Activity    Alcohol use: Not Currently    Drug use: Not Currently    Sexual activity: Yes   Lifestyle    Physical activity:     Days per week: Not on file     Minutes per session: Not on file    Stress: Not on file   Relationships    Social connections:     Talks on phone: Not on file     Gets together: Not on file     Attends Worship service: Not on file     Active member of club or organization: Not on file     Attends meetings of clubs or organizations: Not on file     Relationship status: Not on file    Intimate partner violence:     Fear of current or ex partner: Not on file     Emotionally abused: Not on file     Physically abused: Not on file     Forced sexual activity: Not on file   Other Topics Concern    Not on file   Social History Narrative    Not on file     Family History   Problem Relation Age of Onset    Diabetes Mother     High Blood Pressure Mother     Coronary Art Dis Father     High Blood Pressure Father     Diabetes Father      Allergies   Allergen Reactions    Morphine Anaphylaxis    Demerol Hcl [Meperidine] Itching     Current Outpatient Medications on File Prior to Visit   Medication Sig Dispense Refill    prochlorperazine (COMPAZINE) 10 MG tablet Take 10 mg by mouth every 6 hours as needed      ondansetron (ZOFRAN) 8 MG tablet Take 8 mg by mouth      mirtazapine (REMERON) 15 MG tablet Take 1 12.8 oz (67.9 kg)   12/31/19 153 lb (69.4 kg)   12/10/19 153 lb 4.8 oz (69.5 kg)     Physical Exam  Constitutional:       Appearance: She is well-developed. HENT:      Head: Normocephalic and atraumatic. Eyes:      Pupils: Pupils are equal, round, and reactive to light. Neck:      Musculoskeletal: Normal range of motion and neck supple. Cardiovascular:      Rate and Rhythm: Normal rate and regular rhythm. Heart sounds: No murmur. No friction rub. No gallop. Pulmonary:      Effort: Pulmonary effort is normal.      Breath sounds: Wheezing present. No rales. Comments: Left lungs diminished breath sounds  Chest:      Chest wall: No tenderness. Abdominal:      General: Bowel sounds are normal. There is no distension. Palpations: Abdomen is soft. Tenderness: There is no abdominal tenderness. There is no guarding. Musculoskeletal: Normal range of motion. General: No tenderness or deformity. Skin:     General: Skin is warm and dry. Findings: No erythema or rash. Neurological:      Mental Status: She is alert and oriented to person, place, and time. Motor: Weakness present. Coordination: Coordination abnormal.         Assessment and Plan:      1. Pain, neoplasm-related  -patient is currently on Norco 5/325 BID PRN  - Will continue  with current POC  - Apply heat or ice to painful areas, whichever is preferred  - Perform gentle ROM exercises as tolerated    -Pt is tolerating current pain meds without adverse effects or over sedation. Lowest effective dose used. - advised patient to call if new symptoms develop including, dizziness, sedation, lethargy  Pt is able to maintain adequate functional level and participate in ADLs  OARRS reviewed. There is no indication of aberrant behavior  Pt advised to call for increasing or uncontrolled pain  Risk vs benefit assessed. Pt educated on risk of addiction.    Pt advised to take only as prescribed and not to change

## 2020-02-06 ENCOUNTER — HOSPITAL ENCOUNTER (OUTPATIENT)
Dept: RADIATION ONCOLOGY | Age: 55
Discharge: HOME | End: 2020-02-06
Attending: RADIOLOGY
Payer: COMMERCIAL

## 2020-02-06 PROCEDURE — 77386 HC NTSTY MODUL RAD TX DLVR CPLX: CPT | Performed by: RADIOLOGY

## 2020-02-07 ENCOUNTER — HOSPITAL ENCOUNTER (OUTPATIENT)
Dept: RADIATION ONCOLOGY | Age: 55
Discharge: HOME | End: 2020-02-07
Attending: RADIOLOGY
Payer: COMMERCIAL

## 2020-02-07 PROCEDURE — 77386 HC NTSTY MODUL RAD TX DLVR CPLX: CPT | Performed by: RADIOLOGY

## 2020-02-07 PROCEDURE — 77336 RADIATION PHYSICS CONSULT: CPT | Performed by: RADIOLOGY

## 2020-02-10 ENCOUNTER — HOSPITAL ENCOUNTER (OUTPATIENT)
Dept: RADIATION ONCOLOGY | Age: 55
Discharge: HOME | End: 2020-02-10
Attending: RADIOLOGY
Payer: COMMERCIAL

## 2020-02-10 PROCEDURE — 77386 HC NTSTY MODUL RAD TX DLVR CPLX: CPT | Performed by: RADIOLOGY

## 2020-02-11 ENCOUNTER — HOSPITAL ENCOUNTER (OUTPATIENT)
Dept: RADIATION ONCOLOGY | Age: 55
Discharge: HOME | End: 2020-02-11
Attending: RADIOLOGY
Payer: COMMERCIAL

## 2020-02-11 PROCEDURE — 99213 OFFICE O/P EST LOW 20 MIN: CPT | Performed by: RADIOLOGY

## 2020-02-11 PROCEDURE — 77386 HC NTSTY MODUL RAD TX DLVR CPLX: CPT | Performed by: RADIOLOGY

## 2020-02-11 RX ORDER — LANOLIN ALCOHOL/MO/W.PET/CERES
325 CREAM (GRAM) TOPICAL 2 TIMES DAILY
COMMUNITY
Start: 2020-02-07 | End: 2021-05-20

## 2020-02-11 NOTE — PROGRESS NOTES
Alana Chandler  2/11/2020  Wt Readings from Last 10 Encounters:   01/16/20 149 lb 12.8 oz (67.9 kg)   12/31/19 153 lb (69.4 kg)   12/10/19 153 lb 4.8 oz (69.5 kg)   12/03/19 156 lb 12.8 oz (71.1 kg)   11/26/19 157 lb 12.8 oz (71.6 kg)     Ht Readings from Last 1 Encounters:   01/16/20 5' 1\" (1.549 m)     Wt:  1/28 150.8#  2/4 147#  2/11 145.8#    Last July pt reports she weighed 239#    Weight change: 4# wt loss x 4 wks, 2.7% wt loss x 4 wks  Appetite: Pt reports no appetite, early satiety  N/V/D/C:  C/o constipation    Pt reports taking four Boost per day, which were ordered by her doctor and are sent to her home (unsure which Boost product), and eating a small meal in evening. Pt drinking 32 oz water per day and \"a pot\" of regular coffee per day (pt admits she was instructed to change to decaf coffee and increase water). Pt states she has changed her eating habits and has cut back on fried foods significantly and no longer has a \"taste\" for sugary foods. These changes likely contributed to overall wt loss. Recommendations:  Pt instructed to take supplements between meals instead of in place of meals as long as she is able to eat. Notify this RD of type of Boost she is taking. Instructed on calorie boosting and for pt to reduce coffee intake (gradually). Pt states she drinks coffee for energy, explained that meeting her nutritional intake will also improve her energy level. Will monitor for additional education needs and wts related to possible esophagitis from xrt.      Goal: stable wts, although pt desires wt gain    ASPEN GUIDELINES FOR CLINICAL CHARACTERISTICS OF MALNUTRITION IN CHRONIC ILLNESS     Moderate Malnutrition  Severe Malnutrition    Energy intake  <75% energy intake compared to estimated needs for >1month <75% energy intake compared to estimated needs for >1month   Weight changes  5% x 1 month  7.5% x 3 months   10% x 6 months   20% x 1 year  >5% x 1 month  >7.5% x 3 months >10% x 6 months   >20% x 1 year    Physical findings  Mild   Decrease subcutaneous fat    Decrease muscle mass     Increase fluid/edema   Severe  Decrease subcutaneous fat    Decrease muscle mass     Increase fluid/edema        Magen Fine

## 2020-02-12 ENCOUNTER — APPOINTMENT (OUTPATIENT)
Dept: RADIATION ONCOLOGY | Age: 55
End: 2020-02-12
Attending: RADIOLOGY
Payer: COMMERCIAL

## 2020-02-12 PROCEDURE — 77386 HC NTSTY MODUL RAD TX DLVR CPLX: CPT | Performed by: RADIOLOGY

## 2020-02-13 ENCOUNTER — HOSPITAL ENCOUNTER (OUTPATIENT)
Dept: RADIATION ONCOLOGY | Age: 55
Discharge: HOME | End: 2020-02-13
Attending: RADIOLOGY
Payer: COMMERCIAL

## 2020-02-13 PROCEDURE — 77386 HC NTSTY MODUL RAD TX DLVR CPLX: CPT | Performed by: RADIOLOGY

## 2020-02-14 ENCOUNTER — HOSPITAL ENCOUNTER (OUTPATIENT)
Dept: RADIATION ONCOLOGY | Age: 55
Discharge: HOME | End: 2020-02-14
Attending: RADIOLOGY
Payer: COMMERCIAL

## 2020-02-14 PROCEDURE — 77336 RADIATION PHYSICS CONSULT: CPT | Performed by: RADIOLOGY

## 2020-02-14 PROCEDURE — 77386 HC NTSTY MODUL RAD TX DLVR CPLX: CPT | Performed by: RADIOLOGY

## 2020-02-17 ENCOUNTER — HOSPITAL ENCOUNTER (OUTPATIENT)
Dept: RADIATION ONCOLOGY | Age: 55
End: 2020-02-17
Attending: RADIOLOGY
Payer: COMMERCIAL

## 2020-02-17 PROCEDURE — 77386 HC NTSTY MODUL RAD TX DLVR CPLX: CPT | Performed by: RADIOLOGY

## 2020-02-18 ENCOUNTER — HOSPITAL ENCOUNTER (OUTPATIENT)
Dept: RADIATION ONCOLOGY | Age: 55
Discharge: HOME | End: 2020-02-18
Attending: RADIOLOGY
Payer: COMMERCIAL

## 2020-02-18 PROCEDURE — 77386 HC NTSTY MODUL RAD TX DLVR CPLX: CPT | Performed by: RADIOLOGY

## 2020-02-18 PROCEDURE — 99213 OFFICE O/P EST LOW 20 MIN: CPT | Performed by: RADIOLOGY

## 2020-02-18 NOTE — PROGRESS NOTES
Alana Chandler   [id number]  1965   Patient [facility]-Point Distress Screening Form   Please select the number that describes how much distress you have experiened in the past week (0-10): 4    Please select the number that descrbes how much distress you are experiencing today (0-10): 4    If you responded with a score of 6 or above to the first tow questions; please address the following concerns:    Practical Concerns 0-10 Comment        Work, Concerns about Job          Finances, Bills, Insurance          Housing          Transportation          Availability of Caregiver          Other (e.g. Sexuality/Intimacy,             Personal Care     Emotional Concerns          Sadness, Depression          Anxiety, Worry, Fear          Other (e.g. Abuse, Neglect,        Thoughts of Self-Harm)     Spiritual Concerns          Connection to a higher power          Sense of meaning and purpose          Support for my spiritual community          Other (e.g. Cultural/Spiritual issues,       Power of Atty)     Physical Concerns          Nausea          Eating, Loss of Appetite          Pain          Fatigue       Other Concerns/Notes: Pt reports that despite a history of psychiatric illness and emotional struggles, she feels she is managing her cancer journey well. She states she had been hoping for the opportunity to employ some assistance in the home, but she learned she is not eligible for home health. It is noted that she receives services from [facility] and she has a visiting physician, who has been encouraging that pt re-enroll at [facility] pt currently declines. Nonetheless, pt reports she is not troubled by her inability to receive home health as her finance recently bought her a push chair, which she uses to support herself when doing home chores. He has also been driving her to most of her appointments.   Pt is agreeable to the notion that she is resourceful and adaptive, and she feels she is able to manage most difficulties well. She expresses her gratitude that she is able to breathe better since starting radiation, and this is a relief to her. Pt identified no further social service needs, but stated she knows how to seek SW assistance should she wish it in the future. Date of visit: 2/18/2020  9:09 AM      : Cheng Brooks.  JULIO Delvalle

## 2020-02-18 NOTE — PROGRESS NOTES
Chester Rene  2/18/2020  Wt Readings from Last 10 Encounters:   01/16/20 149 lb 12.8 oz (67.9 kg)   12/31/19 153 lb (69.4 kg)   12/10/19 153 lb 4.8 oz (69.5 kg)   12/03/19 156 lb 12.8 oz (71.1 kg)   11/26/19 157 lb 12.8 oz (71.6 kg)     Ht Readings from Last 1 Encounters:   01/16/20 5' 1\" (1.549 m)     There is no height or weight on file to calculate BMI. Wt today:  145# (145.8# last week)    Weight change: stable this week  Appetite: fair  N/V/D/C: denies     Pt has adjusted diet to soft foods due to discomfort with swallowing. Pt also has started taking Boost Original between meals instead of in place of meals. Pt has reduced her coffee intake as we discussed. Pt following instructions well. Recommendations:  Continue with soft moist foods, calorie boosting and Boost between meals. If unable to maintain wt pt may benefit from Boost Plus in place of Boost Original if unable to maintain wt if symptoms worsen.     Goal:  Stable wts and adequate hydration        ASPEN GUIDELINES FOR CLINICAL CHARACTERISTICS OF MALNUTRITION IN CHRONIC ILLNESS     Moderate Malnutrition  Severe Malnutrition    Energy intake  <75% energy intake compared to estimated needs for >1month <75% energy intake compared to estimated needs for >1month   Weight changes  5% x 1 month  7.5% x 3 months   10% x 6 months   20% x 1 year  >5% x 1 month  >7.5% x 3 months   >10% x 6 months   >20% x 1 year    Physical findings  Mild   Decrease subcutaneous fat    Decrease muscle mass     Increase fluid/edema   Severe  Decrease subcutaneous fat    Decrease muscle mass     Increase fluid/edema        Jhon Moreno

## 2020-02-19 ENCOUNTER — APPOINTMENT (OUTPATIENT)
Dept: RADIATION ONCOLOGY | Age: 55
End: 2020-02-19
Attending: RADIOLOGY
Payer: COMMERCIAL

## 2020-02-19 ENCOUNTER — OFFICE VISIT (OUTPATIENT)
Dept: PALLATIVE CARE | Age: 55
End: 2020-02-19
Payer: COMMERCIAL

## 2020-02-19 VITALS
WEIGHT: 145 LBS | DIASTOLIC BLOOD PRESSURE: 71 MMHG | OXYGEN SATURATION: 90 % | TEMPERATURE: 98.2 F | BODY MASS INDEX: 27.4 KG/M2 | SYSTOLIC BLOOD PRESSURE: 125 MMHG

## 2020-02-19 PROCEDURE — 77386 HC NTSTY MODUL RAD TX DLVR CPLX: CPT | Performed by: RADIOLOGY

## 2020-02-19 PROCEDURE — G8419 CALC BMI OUT NRM PARAM NOF/U: HCPCS | Performed by: NURSE PRACTITIONER

## 2020-02-19 PROCEDURE — 1036F TOBACCO NON-USER: CPT | Performed by: NURSE PRACTITIONER

## 2020-02-19 PROCEDURE — 99348 HOME/RES VST EST LOW MDM 30: CPT | Performed by: NURSE PRACTITIONER

## 2020-02-19 PROCEDURE — 3017F COLORECTAL CA SCREEN DOC REV: CPT | Performed by: NURSE PRACTITIONER

## 2020-02-19 PROCEDURE — G8484 FLU IMMUNIZE NO ADMIN: HCPCS | Performed by: NURSE PRACTITIONER

## 2020-02-19 RX ORDER — HYDROCODONE BITARTRATE AND ACETAMINOPHEN 5; 325 MG/1; MG/1
1 TABLET ORAL 2 TIMES DAILY PRN
Qty: 60 TABLET | Refills: 0 | Status: SHIPPED | OUTPATIENT
Start: 2020-02-19 | End: 2020-03-16 | Stop reason: SDUPTHER

## 2020-02-19 RX ORDER — MIRTAZAPINE 15 MG/1
15 TABLET, FILM COATED ORAL NIGHTLY
Qty: 30 TABLET | Refills: 3 | Status: SHIPPED | OUTPATIENT
Start: 2020-02-19 | End: 2021-05-20

## 2020-02-19 RX ORDER — SENNA PLUS 8.6 MG/1
1 TABLET ORAL 2 TIMES DAILY
Qty: 60 TABLET | Refills: 11 | Status: SHIPPED | OUTPATIENT
Start: 2020-02-19 | End: 2021-02-18

## 2020-02-19 ASSESSMENT — ENCOUNTER SYMPTOMS
COUGH: 1
WHEEZING: 0
VOMITING: 0
CHEST TIGHTNESS: 0
SORE THROAT: 0
DIARRHEA: 0
NAUSEA: 0
CONSTIPATION: 0
BACK PAIN: 1
SINUS PAIN: 0
ABDOMINAL PAIN: 0
TROUBLE SWALLOWING: 0
SHORTNESS OF BREATH: 1

## 2020-02-19 NOTE — PROGRESS NOTES
education: Not on file    Highest education level: Not on file   Occupational History    Not on file   Social Needs    Financial resource strain: Not on file    Food insecurity:     Worry: Not on file     Inability: Not on file    Transportation needs:     Medical: Not on file     Non-medical: Not on file   Tobacco Use    Smoking status: Former Smoker     Packs/day: 1.50     Years: 38.00     Pack years: 57.00     Types: Cigarettes     Start date:      Last attempt to quit: 2019     Years since quittin.3    Smokeless tobacco: Never Used   Substance and Sexual Activity    Alcohol use: Not Currently    Drug use: Not Currently    Sexual activity: Yes   Lifestyle    Physical activity:     Days per week: Not on file     Minutes per session: Not on file    Stress: Not on file   Relationships    Social connections:     Talks on phone: Not on file     Gets together: Not on file     Attends Yazidi service: Not on file     Active member of club or organization: Not on file     Attends meetings of clubs or organizations: Not on file     Relationship status: Not on file    Intimate partner violence:     Fear of current or ex partner: Not on file     Emotionally abused: Not on file     Physically abused: Not on file     Forced sexual activity: Not on file   Other Topics Concern    Not on file   Social History Narrative    Not on file     Family History   Problem Relation Age of Onset    Diabetes Mother     High Blood Pressure Mother     Coronary Art Dis Father     High Blood Pressure Father     Diabetes Father      Allergies   Allergen Reactions    Morphine Anaphylaxis    Demerol Hcl [Meperidine] Itching     Current Outpatient Medications on File Prior to Visit   Medication Sig Dispense Refill    ferrous sulfate 325 (65 Fe) MG EC tablet Take 325 mg by mouth 2 times daily      potassium chloride (KLOR-CON M) 20 MEQ extended release tablet Take 20 mEq by mouth      hydrochlorothiazide

## 2020-02-20 ENCOUNTER — APPOINTMENT (OUTPATIENT)
Dept: RADIATION ONCOLOGY | Age: 55
End: 2020-02-20
Attending: RADIOLOGY
Payer: COMMERCIAL

## 2020-02-20 PROCEDURE — 77386 HC NTSTY MODUL RAD TX DLVR CPLX: CPT | Performed by: RADIOLOGY

## 2020-02-21 ENCOUNTER — APPOINTMENT (OUTPATIENT)
Dept: RADIATION ONCOLOGY | Age: 55
End: 2020-02-21
Attending: RADIOLOGY
Payer: COMMERCIAL

## 2020-02-21 PROCEDURE — 77336 RADIATION PHYSICS CONSULT: CPT | Performed by: RADIOLOGY

## 2020-02-21 PROCEDURE — 77386 HC NTSTY MODUL RAD TX DLVR CPLX: CPT | Performed by: RADIOLOGY

## 2020-02-24 ENCOUNTER — APPOINTMENT (OUTPATIENT)
Dept: RADIATION ONCOLOGY | Age: 55
End: 2020-02-24
Attending: RADIOLOGY
Payer: COMMERCIAL

## 2020-02-24 PROCEDURE — 77386 HC NTSTY MODUL RAD TX DLVR CPLX: CPT | Performed by: RADIOLOGY

## 2020-02-25 ENCOUNTER — HOSPITAL ENCOUNTER (OUTPATIENT)
Dept: RADIATION ONCOLOGY | Age: 55
Discharge: HOME | End: 2020-02-25
Attending: RADIOLOGY
Payer: COMMERCIAL

## 2020-02-25 PROCEDURE — 77386 HC NTSTY MODUL RAD TX DLVR CPLX: CPT | Performed by: RADIOLOGY

## 2020-02-25 PROCEDURE — 99213 OFFICE O/P EST LOW 20 MIN: CPT | Performed by: RADIOLOGY

## 2020-02-26 ENCOUNTER — HOSPITAL ENCOUNTER (OUTPATIENT)
Dept: RADIATION ONCOLOGY | Age: 55
Discharge: HOME | End: 2020-02-26
Attending: RADIOLOGY
Payer: COMMERCIAL

## 2020-02-26 PROCEDURE — 77386 HC NTSTY MODUL RAD TX DLVR CPLX: CPT | Performed by: RADIOLOGY

## 2020-02-26 NOTE — PROGRESS NOTES
Late Entry for 2/25/2020:  Met with pt during her OTV at the request of Dr. Domenic Bush to provide on-going emotional and social support during her course of radiation. Pt was in notably good spirits today, expressing her satisfaction with how she feels treatment is going, and expressing her pleasure that she presented well on physical examination. Her only expressed feeling of distress was related to current constipation, which Dr. Domenic Bush and nurse discussed with her, and then the notion that she is terminating treatment soon, which she will experience as a loss. She stated she had felt a sense of comfort in coming every day. She, nonetheless, also expressed her feeling that she will \"do just fine, really\" along with an understanding that she is always welcome to contact Dr. Domenic Bush or [facility] staff should she have any questions or concerns. She expressed her willingness to do this as needed, remaining smiling and congenial throughout the visit. Pt agreeable to contact SW as well, as needed.

## 2020-02-27 ENCOUNTER — HOSPITAL ENCOUNTER (OUTPATIENT)
Dept: RADIATION ONCOLOGY | Age: 55
Discharge: HOME | End: 2020-02-27
Attending: RADIOLOGY
Payer: COMMERCIAL

## 2020-02-27 PROCEDURE — 77386 HC NTSTY MODUL RAD TX DLVR CPLX: CPT | Performed by: RADIOLOGY

## 2020-02-28 ENCOUNTER — HOSPITAL ENCOUNTER (OUTPATIENT)
Dept: RADIATION ONCOLOGY | Age: 55
Discharge: HOME | End: 2020-02-28
Attending: RADIOLOGY
Payer: COMMERCIAL

## 2020-02-28 PROCEDURE — 77336 RADIATION PHYSICS CONSULT: CPT | Performed by: RADIOLOGY

## 2020-02-28 PROCEDURE — 77386 HC NTSTY MODUL RAD TX DLVR CPLX: CPT | Performed by: RADIOLOGY

## 2020-03-02 ENCOUNTER — HOSPITAL ENCOUNTER (OUTPATIENT)
Dept: RADIATION ONCOLOGY | Age: 55
Discharge: HOME | End: 2020-03-02
Attending: RADIOLOGY
Payer: COMMERCIAL

## 2020-03-02 PROCEDURE — 77386 HC NTSTY MODUL RAD TX DLVR CPLX: CPT | Performed by: RADIOLOGY

## 2020-03-02 NOTE — ONCOLOGY
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 Strongly PDL1 positive  Treatment Dates:  1/27-3/6/20    Site: Dose: Total # fractions: Dose per fraction: Energy: Technique   L lung 60 Gy 30 2 Gy 10 MV photons VMAT/IMRT/IGRT     Concurrent therapy:  weekly radiosensitizing carbotaxol (Eugenie F)    Technique:  Intensity modulation (IMRT) utilizing volumetric modulated arc therapy (VMAT) was used to optimize the dose distribution and spare normal tissue toxicity. Radiotherapy was delivered with 2 counter-rotating 360 degree arcs, comprised of 10 MV photons modulated with an [address]. Daily cone beam CT image guidance (IGRT) was used to allow reduced planning margins, reducing potential side effects. Treatment course:  Ms. aDyan Edmond tolerated radiation treatment well with the expected toxicity. She had progressive disease and pleural effusion on Left requiring replanning early in the treatment. By completion of treatment she was moving some air through the left lung field. Patient was instructed to return to our clinic in approximately 1 week post radiation treatment to manage esophagitis.  She will return to Dr Lydia Miller as directed with further imaging to be performed at [facility]

## 2020-03-03 ENCOUNTER — HOSPITAL ENCOUNTER (OUTPATIENT)
Dept: RADIATION ONCOLOGY | Age: 55
Discharge: HOME | End: 2020-03-03
Attending: RADIOLOGY
Payer: COMMERCIAL

## 2020-03-03 PROCEDURE — 77386 HC NTSTY MODUL RAD TX DLVR CPLX: CPT | Performed by: RADIOLOGY

## 2020-03-03 PROCEDURE — 99213 OFFICE O/P EST LOW 20 MIN: CPT | Performed by: RADIOLOGY

## 2020-03-03 RX ORDER — CHOLECALCIFEROL (VITAMIN D3) 125 MCG
500 CAPSULE ORAL DAILY
COMMUNITY
End: 2021-05-20

## 2020-03-04 ENCOUNTER — HOSPITAL ENCOUNTER (OUTPATIENT)
Dept: RADIATION ONCOLOGY | Age: 55
Discharge: HOME | End: 2020-03-04
Attending: RADIOLOGY
Payer: COMMERCIAL

## 2020-03-04 PROCEDURE — 77386 HC NTSTY MODUL RAD TX DLVR CPLX: CPT | Performed by: RADIOLOGY

## 2020-03-05 ENCOUNTER — HOSPITAL ENCOUNTER (OUTPATIENT)
Dept: RADIATION ONCOLOGY | Age: 55
Discharge: HOME | End: 2020-03-05
Attending: RADIOLOGY
Payer: COMMERCIAL

## 2020-03-05 PROCEDURE — 77386 HC NTSTY MODUL RAD TX DLVR CPLX: CPT | Performed by: RADIOLOGY

## 2020-03-06 ENCOUNTER — HOSPITAL ENCOUNTER (OUTPATIENT)
Dept: RADIATION ONCOLOGY | Age: 55
Discharge: HOME | End: 2020-03-06
Attending: RADIOLOGY
Payer: COMMERCIAL

## 2020-03-06 ENCOUNTER — APPOINTMENT (OUTPATIENT)
Dept: RADIATION ONCOLOGY | Age: 55
End: 2020-03-06
Attending: RADIOLOGY
Payer: COMMERCIAL

## 2020-03-06 PROCEDURE — 77386 HC NTSTY MODUL RAD TX DLVR CPLX: CPT | Performed by: RADIOLOGY

## 2020-03-06 PROCEDURE — 77336 RADIATION PHYSICS CONSULT: CPT | Performed by: RADIOLOGY

## 2020-03-10 PROCEDURE — 99213 OFFICE O/P EST LOW 20 MIN: CPT | Performed by: RADIOLOGY

## 2020-03-16 ENCOUNTER — OFFICE VISIT (OUTPATIENT)
Dept: PALLATIVE CARE | Age: 55
End: 2020-03-16
Payer: COMMERCIAL

## 2020-03-16 VITALS
HEART RATE: 78 BPM | SYSTOLIC BLOOD PRESSURE: 125 MMHG | BODY MASS INDEX: 25.51 KG/M2 | TEMPERATURE: 97.1 F | WEIGHT: 135 LBS | DIASTOLIC BLOOD PRESSURE: 80 MMHG

## 2020-03-16 PROCEDURE — 1036F TOBACCO NON-USER: CPT | Performed by: NURSE PRACTITIONER

## 2020-03-16 PROCEDURE — 99348 HOME/RES VST EST LOW MDM 30: CPT | Performed by: NURSE PRACTITIONER

## 2020-03-16 PROCEDURE — G8419 CALC BMI OUT NRM PARAM NOF/U: HCPCS | Performed by: NURSE PRACTITIONER

## 2020-03-16 PROCEDURE — 3017F COLORECTAL CA SCREEN DOC REV: CPT | Performed by: NURSE PRACTITIONER

## 2020-03-16 PROCEDURE — G8484 FLU IMMUNIZE NO ADMIN: HCPCS | Performed by: NURSE PRACTITIONER

## 2020-03-16 RX ORDER — HYDROCODONE BITARTRATE AND ACETAMINOPHEN 5; 325 MG/1; MG/1
1 TABLET ORAL 2 TIMES DAILY PRN
Qty: 28 TABLET | Refills: 0 | Status: SHIPPED | OUTPATIENT
Start: 2020-03-16 | End: 2020-04-02 | Stop reason: SDUPTHER

## 2020-03-16 RX ORDER — POLYETHYLENE GLYCOL 3350 17 G/17G
17 POWDER, FOR SOLUTION ORAL DAILY
Qty: 1530 G | Refills: 0 | Status: SHIPPED | OUTPATIENT
Start: 2020-03-16 | End: 2020-04-15

## 2020-03-16 ASSESSMENT — ENCOUNTER SYMPTOMS
SINUS PAIN: 0
BACK PAIN: 1
ABDOMINAL PAIN: 0
COUGH: 0
CHEST TIGHTNESS: 0
VOMITING: 0
SHORTNESS OF BREATH: 1
CONSTIPATION: 1
NAUSEA: 0
WHEEZING: 0
DIARRHEA: 0
TROUBLE SWALLOWING: 0
SORE THROAT: 0

## 2020-03-16 NOTE — PROGRESS NOTES
Number of children: Not on file    Years of education: Not on file    Highest education level: Not on file   Occupational History    Not on file   Social Needs    Financial resource strain: Not on file    Food insecurity     Worry: Not on file     Inability: Not on file    Transportation needs     Medical: Not on file     Non-medical: Not on file   Tobacco Use    Smoking status: Former Smoker     Packs/day: 1.50     Years: 38.00     Pack years: 57.00     Types: Cigarettes     Start date:      Last attempt to quit: 2019     Years since quittin.3    Smokeless tobacco: Never Used   Substance and Sexual Activity    Alcohol use: Not Currently    Drug use: Not Currently    Sexual activity: Yes   Lifestyle    Physical activity     Days per week: Not on file     Minutes per session: Not on file    Stress: Not on file   Relationships    Social connections     Talks on phone: Not on file     Gets together: Not on file     Attends Latter-day service: Not on file     Active member of club or organization: Not on file     Attends meetings of clubs or organizations: Not on file     Relationship status: Not on file    Intimate partner violence     Fear of current or ex partner: Not on file     Emotionally abused: Not on file     Physically abused: Not on file     Forced sexual activity: Not on file   Other Topics Concern    Not on file   Social History Narrative    Not on file     Family History   Problem Relation Age of Onset    Diabetes Mother     High Blood Pressure Mother     Coronary Art Dis Father     High Blood Pressure Father     Diabetes Father      Allergies   Allergen Reactions    Morphine Anaphylaxis    Demerol Hcl [Meperidine] Itching     Current Outpatient Medications on File Prior to Visit   Medication Sig Dispense Refill    vitamin B-12 (CYANOCOBALAMIN) 500 MCG tablet Take 500 mcg by mouth daily      mirtazapine (REMERON) 15 MG tablet Take 1 tablet by mouth nightly 30 tablet 3  senna (SENOKOT) 8.6 MG tablet Take 1 tablet by mouth 2 times daily 60 tablet 11    ferrous sulfate 325 (65 Fe) MG EC tablet Take 325 mg by mouth 2 times daily      prochlorperazine (COMPAZINE) 10 MG tablet Take 10 mg by mouth every 6 hours as needed      ondansetron (ZOFRAN) 8 MG tablet Take 8 mg by mouth      ipratropium-albuterol (DUONEB) 0.5-2.5 (3) MG/3ML SOLN nebulizer solution Inhale 1 vial into the lungs every 4 hours      potassium chloride (KLOR-CON M) 20 MEQ extended release tablet Take 20 mEq by mouth      hydrochlorothiazide (HYDRODIURIL) 25 MG tablet Take 25 mg by mouth daily      amLODIPine (NORVASC) 10 MG tablet Take 10 mg by mouth daily      glipiZIDE (GLUCOTROL) 5 MG tablet Take 5 mg by mouth daily      losartan (COZAAR) 50 MG tablet Take 50 mg by mouth daily       No current facility-administered medications on file prior to visit. Review of Systems   Constitutional: Negative for chills, fatigue, fever and unexpected weight change. HENT: Negative for congestion, mouth sores, sinus pain, sore throat and trouble swallowing. Respiratory: Positive for shortness of breath. Negative for cough, chest tightness and wheezing. Cardiovascular: Negative for chest pain, palpitations and leg swelling. Gastrointestinal: Positive for constipation. Negative for abdominal pain, diarrhea, nausea and vomiting. Endocrine: Negative for polydipsia, polyphagia and polyuria. Genitourinary: Negative for dysuria, flank pain, frequency and urgency. Musculoskeletal: Positive for back pain and gait problem. Negative for arthralgias, joint swelling and myalgias. Skin: Negative. Neurological: Positive for weakness. Negative for tremors, seizures, speech difficulty, numbness and headaches. Psychiatric/Behavioral: Negative for agitation, confusion, sleep disturbance and suicidal ideas. The patient is not nervous/anxious.         Objective:   /80   Pulse 78   Temp 97.1 °F (36.2 °C) Wt 135 lb (61.2 kg)   BMI 25.51 kg/m²    Wt Readings from Last 3 Encounters:   03/16/20 135 lb (61.2 kg)   03/10/20 136 lb 6.4 oz (61.9 kg)   02/19/20 145 lb (65.8 kg)     Physical Exam  HENT:      Head: Normocephalic and atraumatic. Eyes:      Pupils: Pupils are equal, round, and reactive to light. Neck:      Musculoskeletal: Normal range of motion and neck supple. Cardiovascular:      Rate and Rhythm: Normal rate and regular rhythm. Heart sounds: No murmur. No friction rub. No gallop. Pulmonary:      Effort: Pulmonary effort is normal.      Breath sounds: Normal breath sounds. No wheezing or rales. Chest:      Chest wall: No tenderness. Abdominal:      General: Bowel sounds are normal. There is no distension. Palpations: Abdomen is soft. Tenderness: There is no abdominal tenderness. There is no guarding. Musculoskeletal: Normal range of motion. General: No tenderness or deformity. Skin:     General: Skin is warm and dry. Findings: No erythema or rash. Neurological:      Mental Status: She is alert and oriented to person, place, and time. Motor: Weakness present. Coordination: Coordination abnormal.      Gait: Gait abnormal.         Assessment and Plan:      1. Pain, neoplasm-related  -Patient's pain is currently managed under current regimen  - Apply heat or ice to painful areas, whichever is preferred  - Perform gentle ROM exercises as tolerated    -Pt is tolerating current pain meds without adverse effects or over sedation. Lowest effective dose used. - advised patient to call if new symptoms develop including, dizziness, sedation, lethargy  Pt is able to maintain adequate functional level and participate in ADLs  OARRS reviewed. There is no indication of aberrant behavior  Pt advised to call for increasing or uncontrolled pain  Risk vs benefit assessed. Pt educated on risk of addiction.    Pt advised to take only as prescribed and not to change

## 2020-03-17 ENCOUNTER — HOSPITAL ENCOUNTER (OUTPATIENT)
Dept: RADIATION ONCOLOGY | Age: 55
Discharge: HOME | End: 2020-03-17
Attending: RADIOLOGY
Payer: COMMERCIAL

## 2020-03-17 VITALS
HEART RATE: 87 BPM | OXYGEN SATURATION: 99 % | WEIGHT: 140.6 LBS | SYSTOLIC BLOOD PRESSURE: 126 MMHG | BODY MASS INDEX: 26.57 KG/M2 | TEMPERATURE: 97.6 F | RESPIRATION RATE: 16 BRPM | DIASTOLIC BLOOD PRESSURE: 72 MMHG

## 2020-03-17 PROCEDURE — 99213 OFFICE O/P EST LOW 20 MIN: CPT | Performed by: RADIOLOGY

## 2020-03-17 RX ORDER — METHYLPREDNISOLONE 4 MG/1
TABLET ORAL SEE ADMIN INSTRUCTIONS
COMMUNITY
Start: 2020-03-11 | End: 2021-05-20

## 2020-03-17 NOTE — H&P
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 Strongly PDL1 positive     PREVIOUS TREATMENT:  Treatment Dates:                    -3/6/20     Site: Dose: Total # fractions: Dose per fraction: Energy: Technique   L lung 60 Gy 30 2 Gy 10 MV photons VMAT/IMRT/IGRT      Concurrent therapy:             weekly radiosensitizing carbotaxol ([facility])     CURRENT COMPLAINT: routine followup and nutrition check     INTERVAL HISTORY: 2 week s/p completion. Complains of pain on swallowing now better, eating better, gained 4 lbs. Breathing better with increased energy on medrol dose pack. PAST MEDICAL HISTORY:   Past Medical History        Past Medical History:   Diagnosis Date    Abnormal Pap smear of cervix      Asthma      Bacterial vaginosis      [facility]      CAD (coronary artery disease)      COPD (chronic obstructive pulmonary disease) (HCC)       chronic bronchitis    Diabetes ([facility] [address]      Ectopic pregnancy      Hypertension      Lung cancer ([facility] [address]      Obesity      Schizophrenia ([facility] [address]      Type 2 diabetes mellitus without complication ([facility] [address]              PAST SURGICAL HISTORY:  Past Surgical History         Past Surgical History:   Procedure Laterality Date    ABDOMEN SURGERY         SECTION        CHOLECYSTECTOMY        DILATION AND CURETTAGE OF UTERUS        TUBAL LIGATION        UMBILICAL HERNIA REPAIR                ALLERGIES:        Allergies   Allergen Reactions    Morphine Anaphylaxis    Demerol Hcl [Meperidine] Itching         I have personally reviewed and reconciled the allergies listed.     CURRENT MEDICATIONS:   Home Medications           Prior to Admission medications    Medication Sig Start Date End Date Taking?  Authorizing Provider   vitamin B-12 (CYANOCOBALAMIN) 500 MCG tablet Take 500 mcg by mouth daily     quittin.3   Smokeless Tobacco Never Used      Social History          Substance and Sexual Activity   Alcohol Use Not Currently         Review Of Systems:      Pain Score:   Pain Score (1-10): 3  Pain Location: throat and esophagus   Pain Duration: with swallowing  Pain Management/Control: has not taken pain medication yet today     Is pain affecting your ability to take care of yourself or move throughout your home?                        []? ? Yes   [x]? ? No    General: Weight Loss and Fatigue  Patient has gained weight []? ? Yes   [x]? ? No  Patient has lost weight [x]? ? Yes   []? ? No  How much weight in pounds and over what length of time: 6lb since last week     Eyes (Ophthalmic): No Problem     Skin (Dermatological): pt has open skin left inframammory fold and left mid back, wound beds are pink, healing and not draining, pt is using remedy cream      ENT: No Problems     Respiratory: Cough and SOB started on       Cardiovascular: No Problems                 Device   []? ? Yes   [x]? ? No              Copy of Card Obtained []? ? Yes   [x]? ? No     Gastrointestinal: Abdominal Pain  At night, last bowel movement yesterday  Genito-Urinary: pt states urinating less     Breast: No Problems     Musculoskeletal: weakness in legs      Neurological: No Problems     Hematological and Lymphatic: No Problems     Endocrine: Diabetes Mellitus blood sugar 99 this am     A 10-point review of systems has been conducted and pertinent positives have been   recorded. All other review of systems are negative.         ECOG PERFORMANCE STATUS: 1     VITAL SIGNS:   126/72   T 97.6  P86 R 16 O2sat RA 99%  Wt 140 lbs         PHYSICAL EXAMINATION:  Constitutional: No acute distress.  awake, alert, cooperative     LUNGS:  + increased work of breathing, bilateral air exchange, scant infrequent wheeze     SKIN:  Dry desquamation under breast nad back on left now healed           A/P:  Probable radiation pneumonitis improved on Medrol

## 2020-03-27 ENCOUNTER — VIRTUAL VISIT (OUTPATIENT)
Dept: PALLATIVE CARE | Age: 55
End: 2020-03-27
Payer: COMMERCIAL

## 2020-03-27 PROCEDURE — 99442 PR PHYS/QHP TELEPHONE EVALUATION 11-20 MIN: CPT | Performed by: NURSE PRACTITIONER

## 2020-03-27 ASSESSMENT — ENCOUNTER SYMPTOMS
TROUBLE SWALLOWING: 0
SHORTNESS OF BREATH: 1
BACK PAIN: 1
DIARRHEA: 0
CHEST TIGHTNESS: 0
WHEEZING: 0
ABDOMINAL PAIN: 0
SINUS PAIN: 0
SORE THROAT: 0
CONSTIPATION: 1
VOMITING: 0
NAUSEA: 0
COUGH: 0

## 2020-03-27 NOTE — PROGRESS NOTES
Subjective:    Id; Patient was consulted via phone conversation for symptom management and goals of care discussion. Patient was accompanied by:self   Chief Complaint   Patient presents with    Pain    Shortness of Breath     Time spent with Patient: 13 mins  Patient was made aware they will be billed for telephone service. Pt presents via telephonic contact due to COVID-19 pandemic emergency protocol. HPI       Velvet Martin is a 53year old female whom was seen today for symptom management and treatment of pain,anxiety,appetite and lung cancer. Patient was pleasant and had a calm demeanor during phone conversation. She reports her pain a 3/10 and she states that it has been getting better overall. She reports she has not been taking any pain meds today so far, however she reports that she has noted that her pain gets worse in the afternoon /evening. Her pain is located mostly in the left side of chest and lower back. She reports that she has seen Elta Sat recently as well and has follow up in about 10-14 days. Patient reports her SOB is at baseline. Report occasional cough with nu mucus production. Denies any fever, chills, headache, myalgia, fatigue,increase in cough. Patient does not wear oxygen . reports using duoneb via nebulizer twice in the last week. Appetite has improved. Reports current weight is 140 lbs as per her report, which is an increase of 5 lbs since last visit. Continues with ensure and boost nutritional supplement. Reports constipation. Last BM yesterday. Report taking Miralax PRN and reports being effective. Negative for nausea ,vomiting. Ambulates without assist. Anxiety/depression at baseline. Sleeps well.     Past Medical History:   Diagnosis Date    Abnormal Pap smear of cervix     Asthma     Bacterial vaginosis     B[facility] disorder ([facility] [address]     CAD (coronary artery disease)     COPD (chronic obstructive pulmonary disease) (HCC)     chronic bronchitis    Diabetes  Diabetes Mother     High Blood Pressure Mother     Coronary Art Dis Father     High Blood Pressure Father     Diabetes Father      Allergies   Allergen Reactions    Morphine Anaphylaxis    Demerol Hcl [Meperidine] Itching     Current Outpatient Medications on File Prior to Visit   Medication Sig Dispense Refill    methylPREDNISolone (MEDROL DOSEPACK) 4 MG tablet Take by mouth See Admin Instructions      HYDROcodone-acetaminophen (NORCO) 5-325 MG per tablet Take 1 tablet by mouth 2 times daily as needed for Pain for up to 14 days. 28 tablet 0    polyethylene glycol (GLYCOLAX) powder Take 17 g by mouth daily 1530 g 0    vitamin B-12 (CYANOCOBALAMIN) 500 MCG tablet Take 500 mcg by mouth daily      mirtazapine (REMERON) 15 MG tablet Take 1 tablet by mouth nightly 30 tablet 3    senna (SENOKOT) 8.6 MG tablet Take 1 tablet by mouth 2 times daily 60 tablet 11    ferrous sulfate 325 (65 Fe) MG EC tablet Take 325 mg by mouth 2 times daily      prochlorperazine (COMPAZINE) 10 MG tablet Take 10 mg by mouth every 6 hours as needed      ondansetron (ZOFRAN) 8 MG tablet Take 8 mg by mouth      ipratropium-albuterol (DUONEB) 0.5-2.5 (3) MG/3ML SOLN nebulizer solution Inhale 1 vial into the lungs every 4 hours      potassium chloride (KLOR-CON M) 20 MEQ extended release tablet Take 20 mEq by mouth      hydrochlorothiazide (HYDRODIURIL) 25 MG tablet Take 25 mg by mouth daily      amLODIPine (NORVASC) 10 MG tablet Take 10 mg by mouth daily      glipiZIDE (GLUCOTROL) 5 MG tablet Take 5 mg by mouth daily      losartan (COZAAR) 50 MG tablet Take 50 mg by mouth daily       No current facility-administered medications on file prior to visit. Review of Systems   Constitutional: Negative for chills, fatigue, fever and unexpected weight change. HENT: Negative for congestion, mouth sores, sinus pain, sore throat and trouble swallowing. Respiratory: Positive for shortness of breath.  Negative for cough, high protein diet  3. Other constipation  -currently on senokot   -has Miralax PRN available if no Bm for two days  -high fiber diet recommended  4. SOB (shortness of breath)  -continue with current POC  - notify provider if new symptoms develop ar current symptoms are worsening  - report to provider if develop fever, chills, productive cough, night sweats or increase in SOB  - use inhalers as directed and rinse mouth after each use    5. Palliative care encounter  -follow up in 1 month with Palliative care  -follow up with oncology as ordered  -advised to avoid public areas and educated on social distancing        There are no discontinued medications. Discussed with patient/surrogate: POC, Treatment risks/benefits, and alternatives including as noted above. All questions were answered. Gave much active listening, presence, and emotional support. Due to acuity, symptomatology and high-risk medication management,   I advised patient to follow up in 3 weeks. Total Time 35 mins   > 50% Time Spent Counseling/Care coordination?  yes     REINA Edwards - NP    Collaborating Physician : Dr. Irving Reyes

## 2020-04-02 RX ORDER — HYDROCODONE BITARTRATE AND ACETAMINOPHEN 5; 325 MG/1; MG/1
1 TABLET ORAL 2 TIMES DAILY PRN
Qty: 28 TABLET | Refills: 0 | Status: SHIPPED | OUTPATIENT
Start: 2020-04-02 | End: 2020-04-17 | Stop reason: SDUPTHER

## 2020-04-17 RX ORDER — HYDROCODONE BITARTRATE AND ACETAMINOPHEN 5; 325 MG/1; MG/1
1 TABLET ORAL 2 TIMES DAILY PRN
Qty: 28 TABLET | Refills: 0 | Status: SHIPPED | OUTPATIENT
Start: 2020-04-17 | End: 2020-05-01 | Stop reason: SDUPTHER

## 2020-04-20 ENCOUNTER — VIRTUAL VISIT (OUTPATIENT)
Dept: PALLATIVE CARE | Age: 55
End: 2020-04-20
Payer: COMMERCIAL

## 2020-04-20 PROCEDURE — 99442 PR PHYS/QHP TELEPHONE EVALUATION 11-20 MIN: CPT | Performed by: NURSE PRACTITIONER

## 2020-04-20 NOTE — PROGRESS NOTES
Subjective:   ID:Patient was consulted via phone conversation for symptom management and goals of care discussion. Chief Complaint   Patient presents with    Pain    Other     nausea   Time spent with Patient: 16 mins  Patient was made aware they will be billed for telephone service. Patient in agreement. Pt presents via telephonic contact due to COVID-19 pandemic emergency protocol. MIKE Avelar is a 63year old female whom was consulted  today for symptom management and treatment r/t  pain,anxiety,appetite, nausea d/t lung cancer. Patient presented today alert and oriented , calm and in NAD. She reports that her pain has been getting worse and its located I left chest, sides and left side of lower back. She reports that she hah had follow up with oncology and continues to have chemo every two weeks. Patient reports appetite is good. She started to have increased nausea and currently taking zofran 1-2 times per day PRN. Patient reports as being effective. Current weight is a 150 lbs as per patient report. She was noted to have about 10 lbs weight gain. Patient reports her SOB is at baseline. Report occasional cough with no mucus production. Denies any fever, chills, headache, myalgia, fatigue,increase in cough. Patient does not wear oxygen . Ambulates without assist.Anxiety/depression at baseline. Negative for insomnia.     Past Medical History:   Diagnosis Date    Abnormal Pap smear of cervix     Asthma     Bacterial vaginosis     Bipolar 1 disorder ([address]     CAD (coronary artery disease)     COPD (chronic obstructive pulmonary disease) (HCC)     chronic bronchitis    Diabetes ([address]     Ectopic pregnancy     Hypertension     Lung cancer ([address]     Obesity     Schizophrenia ([address]     Type 2 diabetes mellitus without complication ([address]      Past Surgical History:   Procedure Laterality Date    ABDOMEN SURGERY       SECTION      CHOLECYSTECTOMY      DILATION AND CURETTAGE OF UTERUS      TUBAL LIGATION      UMBILICAL HERNIA REPAIR       Social History     Socioeconomic History    Marital status:       Spouse name: Not on file    Number of children: Not on file    Years of education: Not on file    Highest education level: Not on file   Occupational History    Not on file   Social Needs    Financial resource strain: Not on file    Food insecurity     Worry: Not on file     Inability: Not on file    Transportation needs     Medical: Not on file     Non-medical: Not on file   Tobacco Use    Smoking status: Former Smoker     Packs/day: 1.50     Years: 38.00     Pack years: 57.00     Types: Cigarettes     Start date:      Last attempt to quit: 2019     Years since quittin.4    Smokeless tobacco: Never Used   Substance and Sexual Activity    Alcohol use: Not Currently    Drug use: Not Currently    Sexual activity: Yes   Lifestyle    Physical activity     Days per week: Not on file     Minutes per session: Not on file    Stress: Not on file   Relationships    Social connections     Talks on phone: Not on file     Gets together: Not on file     Attends Worship service: Not on file     Active member of club or organization: Not on file     Attends meetings of clubs or organizations: Not on file     Relationship status: Not on file    Intimate partner violence     Fear of current or ex partner: Not on file     Emotionally abused: Not on file     Physically abused: Not on file     Forced sexual activity: Not on file   Other Topics Concern    Not on file   Social History Narrative    Not on file     Family History   Problem Relation Age of Onset    Diabetes Mother     High Blood Pressure Mother     Coronary Art Dis Father     High Blood Pressure Father     Diabetes Father      Allergies   Allergen Reactions    Morphine Anaphylaxis    Demerol Hcl [Meperidine] Itching     Current Outpatient Medications on File Prior to Visit   Medication Sig Dispense Refill    HYDROcodone-acetaminophen (NORCO) 5-325 MG per tablet Take 1 tablet by mouth 2 times daily as needed for Pain for up to 14 days. 28 tablet 0    methylPREDNISolone (MEDROL DOSEPACK) 4 MG tablet Take by mouth See Admin Instructions      vitamin B-12 (CYANOCOBALAMIN) 500 MCG tablet Take 500 mcg by mouth daily      mirtazapine (REMERON) 15 MG tablet Take 1 tablet by mouth nightly 30 tablet 3    senna (SENOKOT) 8.6 MG tablet Take 1 tablet by mouth 2 times daily 60 tablet 11    ferrous sulfate 325 (65 Fe) MG EC tablet Take 325 mg by mouth 2 times daily      prochlorperazine (COMPAZINE) 10 MG tablet Take 10 mg by mouth every 6 hours as needed      ondansetron (ZOFRAN) 8 MG tablet Take 8 mg by mouth      ipratropium-albuterol (DUONEB) 0.5-2.5 (3) MG/3ML SOLN nebulizer solution Inhale 1 vial into the lungs every 4 hours      potassium chloride (KLOR-CON M) 20 MEQ extended release tablet Take 20 mEq by mouth      hydrochlorothiazide (HYDRODIURIL) 25 MG tablet Take 25 mg by mouth daily      amLODIPine (NORVASC) 10 MG tablet Take 10 mg by mouth daily      glipiZIDE (GLUCOTROL) 5 MG tablet Take 5 mg by mouth daily      losartan (COZAAR) 50 MG tablet Take 50 mg by mouth daily       No current facility-administered medications on file prior to visit. Review of Systems   Constitutional: Negative for chills, fatigue, fever and unexpected weight change. HENT: Negative for congestion, mouth sores, sinus pain, sore throat and trouble swallowing. Respiratory: Negative for cough, chest tightness, shortness of breath and wheezing. Cardiovascular: Negative for chest pain, palpitations and leg swelling. Gastrointestinal: Positive for nausea. Negative for abdominal pain, constipation, diarrhea and vomiting. Endocrine: Negative for polydipsia, polyphagia and polyuria. Genitourinary: Negative for dysuria, flank pain, frequency and urgency. Musculoskeletal: Positive for back pain.  Negative for

## 2020-04-22 ASSESSMENT — ENCOUNTER SYMPTOMS
TROUBLE SWALLOWING: 0
CHEST TIGHTNESS: 0
CONSTIPATION: 0
VOMITING: 0
SHORTNESS OF BREATH: 0
WHEEZING: 0
SINUS PAIN: 0
COUGH: 0
BACK PAIN: 1
ABDOMINAL PAIN: 0
DIARRHEA: 0
SORE THROAT: 0
NAUSEA: 1

## 2020-05-01 RX ORDER — HYDROCODONE BITARTRATE AND ACETAMINOPHEN 5; 325 MG/1; MG/1
1 TABLET ORAL 3 TIMES DAILY PRN
Qty: 42 TABLET | Refills: 0 | Status: SHIPPED | OUTPATIENT
Start: 2020-05-01 | End: 2020-05-15 | Stop reason: SDUPTHER

## 2020-05-01 NOTE — TELEPHONE ENCOUNTER
Rx requested:  Requested Prescriptions     Pending Prescriptions Disp Refills    HYDROcodone-acetaminophen (NORCO) 5-325 MG per tablet 28 tablet 0     Sig: Take 1 tablet by mouth 2 times daily as needed for Pain for up to 14 days.        Last Office Visit:   3/16/2020      Last filled:  4/17/2020    Next Visit Date:  Future Appointments   Date Time Provider Karolina Marin   5/13/2020 11:00 AM  Record, APRN - NP [address]

## 2020-05-13 ENCOUNTER — VIRTUAL VISIT (OUTPATIENT)
Dept: PALLATIVE CARE | Age: 55
End: 2020-05-13
Payer: COMMERCIAL

## 2020-05-13 VITALS — WEIGHT: 154 LBS | BODY MASS INDEX: 29.1 KG/M2

## 2020-05-13 PROCEDURE — 99213 OFFICE O/P EST LOW 20 MIN: CPT | Performed by: NURSE PRACTITIONER

## 2020-05-13 ASSESSMENT — ENCOUNTER SYMPTOMS
VOMITING: 0
TROUBLE SWALLOWING: 0
NAUSEA: 1
BACK PAIN: 1
ABDOMINAL PAIN: 0
CONSTIPATION: 0
DIARRHEA: 0
CHEST TIGHTNESS: 0
SHORTNESS OF BREATH: 0
SINUS PAIN: 0
COUGH: 0
SORE THROAT: 0
WHEEZING: 0

## 2020-05-13 NOTE — PROGRESS NOTES
Subjective:   ID: Patient was consulted via phone conversation for symptom management and goals of care discussion. Patient was accompanied by self. Chief Complaint   Patient presents with    Pain    Cough    Follow-up   Time spent with Patient: 10 mins  Patient was made aware they will be billed for telephone service. Patient in agreement. Pt presents via telephonic contact due to COVID-19 pandemic emergency protocol. HPI      Patient is a 63year old female whom was consulted via phone conversation for symptom management and treatment r/t  Lung cancer, pain, SOB, decreased appetite, nausea. Patient presented today alert and oriented and in NAD. She reports that currently does not have any pain since she just took her pain medication about one hour ago. She states that her pain is well managed and it is worse after she receives chemo. She states that she receives chemo every two weeks. Patient reports that she has occasional cough with no mucus production. Negative for any fever, chill, fatigue , myalgia, night sweats. SOB with exertion. Positive for nausea. Takes Zofran which is effective. Negative for vomiting. Appetite is good. Current weight is 154 lbs. Ambulates without assist. No recent fall. Denies any increase in anxiety/depression or sleep disturbance.          Past Medical History:   Diagnosis Date    Abnormal Pap smear of cervix     Asthma     Bacterial vaginosis     Bipolar 1 disorder ([address]     CAD (coronary artery disease)     COPD (chronic obstructive pulmonary disease) (HCC)     chronic bronchitis    Diabetes ([address]     Ectopic pregnancy     Hypertension     Lung cancer ([address]     Obesity     [facility] [address]     Type 2 diabetes mellitus without complication ([facility] [address]      Past Surgical History:   Procedure Laterality Date    ABDOMEN SURGERY       SECTION      CHOLECYSTECTOMY      DILATION AND CURETTAGE OF UTERUS      TUBAL LIGATION      UMBILICAL HERNIA REPAIR Social History     Socioeconomic History    Marital status:       Spouse name: Not on file    Number of children: Not on file    Years of education: Not on file    Highest education level: Not on file   Occupational History    Not on file   Social Needs    Financial resource strain: Not on file    Food insecurity     Worry: Not on file     Inability: Not on file    Transportation needs     Medical: Not on file     Non-medical: Not on file   Tobacco Use    Smoking status: Former Smoker     Packs/day: 1.50     Years: 38.00     Pack years: 57.00     Types: Cigarettes     Start date:      Last attempt to quit: 2019     Years since quittin.5    Smokeless tobacco: Never Used   Substance and Sexual Activity    Alcohol use: Not Currently    Drug use: Not Currently    Sexual activity: Yes   Lifestyle    Physical activity     Days per week: Not on file     Minutes per session: Not on file    Stress: Not on file   Relationships    Social connections     Talks on phone: Not on file     Gets together: Not on file     Attends Orthodoxy service: Not on file     Active member of club or organization: Not on file     Attends meetings of clubs or organizations: Not on file     Relationship status: Not on file    Intimate partner violence     Fear of current or ex partner: Not on file     Emotionally abused: Not on file     Physically abused: Not on file     Forced sexual activity: Not on file   Other Topics Concern    Not on file   Social History Narrative    Not on file     Family History   Problem Relation Age of Onset    Diabetes Mother     High Blood Pressure Mother     Coronary Art Dis Father     High Blood Pressure Father     Diabetes Father      Allergies   Allergen Reactions    Morphine Anaphylaxis    Demerol Hcl [Meperidine] Itching     Current Outpatient Medications on File Prior to Visit   Medication Sig Dispense Refill    HYDROcodone-acetaminophen ([facility]) 5-325 MG per

## 2020-05-15 RX ORDER — HYDROCODONE BITARTRATE AND ACETAMINOPHEN 5; 325 MG/1; MG/1
1 TABLET ORAL 3 TIMES DAILY PRN
Qty: 42 TABLET | Refills: 0 | Status: SHIPPED | OUTPATIENT
Start: 2020-05-15 | End: 2020-05-29 | Stop reason: SDUPTHER

## 2020-05-29 RX ORDER — HYDROCODONE BITARTRATE AND ACETAMINOPHEN 5; 325 MG/1; MG/1
1 TABLET ORAL 3 TIMES DAILY PRN
Qty: 42 TABLET | Refills: 0 | Status: SHIPPED | OUTPATIENT
Start: 2020-05-29 | End: 2020-06-15 | Stop reason: SDUPTHER

## 2020-05-29 NOTE — TELEPHONE ENCOUNTER
Rx requested:  Requested Prescriptions     Pending Prescriptions Disp Refills    HYDROcodone-acetaminophen (NORCO) 5-325 MG per tablet 42 tablet 0     Sig: Take 1 tablet by mouth 3 times daily as needed for Pain for up to 14 days. Last Office Visit:   3/16/2020      Last filled:  5/15/2020    Next Visit Date:  Future Appointments   Date Time Provider Karolina Marin   6/24/2020  9:00 AM Bruce Chavez, APRN - NP [facility]  [address] [facility]

## 2020-06-15 RX ORDER — HYDROCODONE BITARTRATE AND ACETAMINOPHEN 5; 325 MG/1; MG/1
1 TABLET ORAL 3 TIMES DAILY PRN
Qty: 42 TABLET | Refills: 0 | Status: SHIPPED | OUTPATIENT
Start: 2020-06-15 | End: 2020-06-29

## 2020-06-15 NOTE — TELEPHONE ENCOUNTER
Rx requested:  Requested Prescriptions     Pending Prescriptions Disp Refills    HYDROcodone-acetaminophen (NORCO) 5-325 MG per tablet 42 tablet 0     Sig: Take 1 tablet by mouth 3 times daily as needed for Pain for up to 14 days.        Last Office Visit:   3/16/2020      Last filled:  5/29/2020    Next Visit Date:  Future Appointments   Date Time Provider Karolina Marin   6/24/2020  9:00 AM REINA Fuentes NP [address] Alert and oriented to person, place and time, memory intact, behavior appropriate to situation, PERRL.

## 2020-06-24 ENCOUNTER — OFFICE VISIT (OUTPATIENT)
Dept: PALLATIVE CARE | Age: 55
End: 2020-06-24

## 2020-06-24 VITALS
WEIGHT: 157 LBS | HEART RATE: 86 BPM | SYSTOLIC BLOOD PRESSURE: 138 MMHG | DIASTOLIC BLOOD PRESSURE: 95 MMHG | BODY MASS INDEX: 29.66 KG/M2 | OXYGEN SATURATION: 95 % | TEMPERATURE: 98.1 F

## 2020-06-24 PROCEDURE — 99348 HOME/RES VST EST LOW MDM 30: CPT | Performed by: NURSE PRACTITIONER

## 2020-06-24 ASSESSMENT — ENCOUNTER SYMPTOMS
ABDOMINAL PAIN: 0
TROUBLE SWALLOWING: 0
VOMITING: 0
COUGH: 1
NAUSEA: 0
CHEST TIGHTNESS: 0
SINUS PAIN: 0
WHEEZING: 0
SORE THROAT: 0
BACK PAIN: 1
CONSTIPATION: 0
DIARRHEA: 0

## 2020-06-24 NOTE — PROGRESS NOTES
on file   Social Needs    Financial resource strain: Not on file    Food insecurity     Worry: Not on file     Inability: Not on file    Transportation needs     Medical: Not on file     Non-medical: Not on file   Tobacco Use    Smoking status: Former Smoker     Packs/day: 1.50     Years: 38.00     Pack years: 57.00     Types: Cigarettes     Start date:      Last attempt to quit: 2019     Years since quittin.6    Smokeless tobacco: Never Used   Substance and Sexual Activity    Alcohol use: Not Currently    Drug use: Not Currently    Sexual activity: Yes   Lifestyle    Physical activity     Days per week: Not on file     Minutes per session: Not on file    Stress: Not on file   Relationships    Social connections     Talks on phone: Not on file     Gets together: Not on file     Attends Protestant service: Not on file     Active member of club or organization: Not on file     Attends meetings of clubs or organizations: Not on file     Relationship status: Not on file    Intimate partner violence     Fear of current or ex partner: Not on file     Emotionally abused: Not on file     Physically abused: Not on file     Forced sexual activity: Not on file   Other Topics Concern    Not on file   Social History Narrative    Not on file     Family History   Problem Relation Age of Onset    Diabetes Mother     High Blood Pressure Mother     Coronary Art Dis Father     High Blood Pressure Father     Diabetes Father      Allergies   Allergen Reactions    Morphine Anaphylaxis    Demerol Hcl [Meperidine] Itching     Current Outpatient Medications on File Prior to Visit   Medication Sig Dispense Refill    HYDROcodone-acetaminophen (NORCO) 5-325 MG per tablet Take 1 tablet by mouth 3 times daily as needed for Pain for up to 14 days.  42 tablet 0    methylPREDNISolone (MEDROL DOSEPACK) 4 MG tablet Take by mouth See Admin Instructions      vitamin B-12 (CYANOCOBALAMIN) 500 MCG tablet Take 500 mcg by

## 2020-07-02 RX ORDER — HYDROCODONE BITARTRATE AND ACETAMINOPHEN 5; 325 MG/1; MG/1
1 TABLET ORAL 3 TIMES DAILY PRN
Qty: 90 TABLET | Refills: 0 | Status: SHIPPED | OUTPATIENT
Start: 2020-07-02 | End: 2020-07-31 | Stop reason: SDUPTHER

## 2020-07-31 RX ORDER — HYDROCODONE BITARTRATE AND ACETAMINOPHEN 5; 325 MG/1; MG/1
1 TABLET ORAL 3 TIMES DAILY PRN
Qty: 90 TABLET | Refills: 0 | Status: SHIPPED | OUTPATIENT
Start: 2020-07-31 | End: 2020-09-01 | Stop reason: SDUPTHER

## 2020-08-12 ENCOUNTER — OFFICE VISIT (OUTPATIENT)
Dept: PALLATIVE CARE | Age: 55
End: 2020-08-12
Payer: COMMERCIAL

## 2020-08-12 PROCEDURE — 99348 HOME/RES VST EST LOW MDM 30: CPT | Performed by: NURSE PRACTITIONER

## 2020-08-12 RX ORDER — ONDANSETRON HYDROCHLORIDE 8 MG/1
8 TABLET, FILM COATED ORAL EVERY 8 HOURS PRN
Qty: 90 TABLET | Refills: 0 | Status: SHIPPED | OUTPATIENT
Start: 2020-08-12 | End: 2020-09-11

## 2020-08-12 RX ORDER — GABAPENTIN 100 MG/1
100 CAPSULE ORAL 2 TIMES DAILY
Qty: 180 CAPSULE | Refills: 1 | Status: SHIPPED | OUTPATIENT
Start: 2020-08-12 | End: 2021-05-20

## 2020-08-12 ASSESSMENT — ENCOUNTER SYMPTOMS
CHEST TIGHTNESS: 0
SINUS PAIN: 0
DIARRHEA: 0
CONSTIPATION: 1
BACK PAIN: 0
NAUSEA: 1
TROUBLE SWALLOWING: 0
WHEEZING: 0
ABDOMINAL PAIN: 0
COUGH: 0
SHORTNESS OF BREATH: 1
SORE THROAT: 0
VOMITING: 0

## 2020-08-12 NOTE — PROGRESS NOTES
Subjective: Id: Patient was consulted at home in [facility] for symptom management and goals of care discussion. No chief complaint on file. HPI       Patient was seen at home today for symptom management and goals of care discussion r/t her complex PMH which includes:Lung cancer, SOB, decreased appetite, nausea,neoplasm related pain. Patient presented today alert and oriented x3. She states that her pain in the left shoulder and side has been getting worse. She currently rates her pain 7/10. Last time she took [address] today about 5 hours ago. She states that her pain is tingling at night and aching during the day. She states that she had Ct scan of chest on 8/3/2020. She reports that her left shoulder pain got worse after she finished radiation therapy. She continues to see hematology/oncology for infusions every two weeks. (IMFINZI). Patient states that she has more nausea and she states that the day after infusion she does have emesis and increased nausea for 2-3 days. Reports Zofran is effective. Positive for occasional constipation. Last BM yesterday. Positive SOB is at baseline. Occasional dry cough. Denies any fever, chill, night sweats. No recent falls. Appetite is good. Weight 170 lbs. Noted some weight gain. Positive for occasional anxiety. Denies any suicidal/homicidal ideation.     Past Medical History:   Diagnosis Date    Abnormal Pap smear of cervix     Asthma     Bacterial vaginosis     Bipolar 1 disorder ([address]     CAD (coronary artery disease)     COPD (chronic obstructive pulmonary disease) (HCC)     chronic bronchitis    Diabetes ([address]     Ectopic pregnancy     Hypertension     Lung cancer ([address]     Obesity     Schizophrenia ([address]     Type 2 diabetes mellitus without complication [address] [facility]      Past Surgical History:   Procedure Laterality Date    ABDOMEN SURGERY       SECTION      CHOLECYSTECTOMY      DILATION AND CURETTAGE OF UTERUS      TUBAL LIGATION      UMBILICAL HERNIA REPAIR       Social History     Socioeconomic History    Marital status:       Spouse name: Not on file    Number of children: Not on file    Years of education: Not on file    Highest education level: Not on file   Occupational History    Not on file   Social Needs    Financial resource strain: Not on file    Food insecurity     Worry: Not on file     Inability: Not on file    Transportation needs     Medical: Not on file     Non-medical: Not on file   Tobacco Use    Smoking status: Former Smoker     Packs/day: 1.50     Years: 38.00     Pack years: 57.00     Types: Cigarettes     Start date:      Last attempt to quit: 2019     Years since quittin.7    Smokeless tobacco: Never Used   Substance and Sexual Activity    Alcohol use: Not Currently    Drug use: Not Currently    Sexual activity: Yes   Lifestyle    Physical activity     Days per week: Not on file     Minutes per session: Not on file    Stress: Not on file   Relationships    Social connections     Talks on phone: Not on file     Gets together: Not on file     Attends Congregation service: Not on file     Active member of club or organization: Not on file     Attends meetings of clubs or organizations: Not on file     Relationship status: Not on file    Intimate partner violence     Fear of current or ex partner: Not on file     Emotionally abused: Not on file     Physically abused: Not on file     Forced sexual activity: Not on file   Other Topics Concern    Not on file   Social History Narrative    Not on file     Family History   Problem Relation Age of Onset    Diabetes Mother     High Blood Pressure Mother     Coronary Art Dis Father     High Blood Pressure Father     Diabetes Father      Allergies   Allergen Reactions    Morphine Anaphylaxis    Demerol Hcl [Meperidine] Itching     Current Outpatient Medications on File Prior to Visit   Medication Sig Dispense Refill    HYDROcodone-acetaminophen and left side pain   Skin: Negative. Neurological: Negative for tremors, seizures, speech difficulty, weakness, numbness and headaches. Psychiatric/Behavioral: Negative for agitation, confusion, sleep disturbance and suicidal ideas. The patient is not nervous/anxious. Objective: There were no vitals taken for this visit. Wt Readings from Last 3 Encounters:   06/24/20 157 lb (71.2 kg)   05/13/20 154 lb (69.9 kg)   03/17/20 140 lb 9.6 oz (63.8 kg)     Physical Exam  Constitutional:       Appearance: She is well-developed. HENT:      Head: Normocephalic and atraumatic. Eyes:      Pupils: Pupils are equal, round, and reactive to light. Neck:      Musculoskeletal: Normal range of motion and neck supple. Cardiovascular:      Rate and Rhythm: Normal rate and regular rhythm. Heart sounds: No murmur. No friction rub. No gallop. Pulmonary:      Effort: Pulmonary effort is normal.      Breath sounds: Normal breath sounds. No wheezing or rales. Comments: Diminished lung sounds  Chest:      Chest wall: No tenderness. Abdominal:      General: Bowel sounds are normal. There is no distension. Palpations: Abdomen is soft. Tenderness: There is no abdominal tenderness. There is no guarding. Musculoskeletal: Normal range of motion. General: No tenderness or deformity. Skin:     General: Skin is warm and dry. Findings: No erythema or rash. Neurological:      Mental Status: She is alert and oriented to person, place, and time. Motor: Weakness present. Coordination: Coordination abnormal.      Gait: Gait abnormal.         Assessment and Plan:      1.  Pain, neoplasm-related  -continue with Norco as ordered TID PRN  -initiate gabapentin 100 Mg BID-reports tingling and some  numbness   -plan to have X-ray of done of left shoulder on her next doctor's appointment-per patient report  -Apply heat or ice to painful areas, whichever is preferred  - Perform gentle ROM exercises as tolerated    -Pt is tolerating current pain meds without adverse effects or over sedation. Lowest effective dose used. - advised patient to call if new symptoms develop including, dizziness, sedation, lethargy  Pt is able to maintain adequate functional level and participate in ADLs  OARRS reviewed. There is no indication of aberrant behavior  Pt advised to call for increasing or uncontrolled pain  Risk vs benefit assessed. Pt educated on risk of addiction. Pt advised to take only as prescribed and not to change frequency of pain meds without consulting palliative care first  2. Malignant neoplasm of left lung, unspecified part of lung ([facility] [address] [facility]  -continue with infusions every two weeks as per oncology treatment plan    3. SOB (shortness of breath)  -currently stable  -has COPD ER pack available if needed  -advised to take rest periods when walking longer distances    4. Other constipation  -managed     5. Nausea  -will start Zofran 8mg  TID  -re-evaluate patient next visit  6. Palliative care encounter  -noted weight gain  -follow up with palliative care in 1 month. Medications Discontinued During This Encounter   Medication Reason    ondansetron (ZOFRAN) 8 MG tablet REORDER       Discussed with patient/surrogate: POC, Treatment risks/benefits, and alternatives including as noted above. All questions were answered. Gave much active listening, presence, and emotional support. Due to acuity, symptomatology and high-risk medication management,   I advised patient to No follow-ups on file. Total Time 30 mins   > 50% Time Spent Counseling/Care coordination?  yes     REINA Murphy - NP    Collaborating Physician : Dr. Remberto Cifuentes

## 2020-09-01 RX ORDER — HYDROCODONE BITARTRATE AND ACETAMINOPHEN 5; 325 MG/1; MG/1
1 TABLET ORAL 3 TIMES DAILY PRN
Qty: 90 TABLET | Refills: 0 | Status: SHIPPED | OUTPATIENT
Start: 2020-09-01 | End: 2020-10-01 | Stop reason: SDUPTHER

## 2020-09-09 ENCOUNTER — OFFICE VISIT (OUTPATIENT)
Dept: PALLATIVE CARE | Age: 55
End: 2020-09-09
Payer: COMMERCIAL

## 2020-09-09 VITALS
DIASTOLIC BLOOD PRESSURE: 80 MMHG | BODY MASS INDEX: 33.37 KG/M2 | SYSTOLIC BLOOD PRESSURE: 135 MMHG | WEIGHT: 176.6 LBS | TEMPERATURE: 97.4 F | HEART RATE: 80 BPM

## 2020-09-09 PROCEDURE — 99348 HOME/RES VST EST LOW MDM 30: CPT | Performed by: NURSE PRACTITIONER

## 2020-09-09 ASSESSMENT — ENCOUNTER SYMPTOMS
SINUS PAIN: 0
SORE THROAT: 0
TROUBLE SWALLOWING: 0
WHEEZING: 0
BACK PAIN: 0
VOMITING: 0
SHORTNESS OF BREATH: 1
CHEST TIGHTNESS: 0
ABDOMINAL PAIN: 0
COUGH: 0
DIARRHEA: 0
NAUSEA: 1

## 2020-09-09 NOTE — PROGRESS NOTES
Subjective: Id: Patient was consulted at home in [facility] for symptom management and goals of care discussion. Patient was accompanied by:self   Chief Complaint   Patient presents with    Pain    Other     weight gain        HPI      Patient was seen at home today for symptom management and goals of care discussion r/t her complex PMH which includes:Lung cancer, SOB, weight gain, nausea,neoplasm related pain. Patient presented alert and oriented x 3. She was seen sitting on the couch in [facility]. She reports her pain about 2/10 and well managed with current regimen. The pain is located in the left shoulder. Continues to have Chemo every two weeks. Positive for occasional nausea, but managed with Zofran. Her appetite has improved significantly and patient has been gaining weight. She has been trying to avoid late night snacking. She is happy with current weight, does not want to gain anymore. Occasional SOB with exertion/activity. No cough. Does not go outside for walks anymore. Walks without assist in apartment. No recent falls. Anxiety and depression managed. Reports someone  in family-suddenly. Managing grief. Denies any suicidal/homicidal ideation. Past Medical History:   Diagnosis Date    Abnormal Pap smear of cervix     Asthma     Bacterial vaginosis     Bipolar 1 disorder ([address]     CAD (coronary artery disease)     COPD (chronic obstructive pulmonary disease) (HCC)     chronic bronchitis    Diabetes ([address]     Ectopic pregnancy     Hypertension     Lung cancer ([address]     Obesity     Schizophrenia ([facility]     Type 2 diabetes mellitus without complication ([facility] [address] [facility]      Past Surgical History:   Procedure Laterality Date    ABDOMEN SURGERY       SECTION      CHOLECYSTECTOMY      DILATION AND CURETTAGE OF UTERUS      TUBAL LIGATION      UMBILICAL HERNIA REPAIR       Social History     Socioeconomic History    Marital status:       Spouse name: Not on file    Number of MG tablet Take 1 tablet by mouth every 8 hours as needed for Nausea or Vomiting 90 tablet 0    gabapentin (NEURONTIN) 100 MG capsule Take 1 capsule by mouth 2 times daily for 180 days. Intended supply: 90 days 180 capsule 1    methylPREDNISolone (MEDROL DOSEPACK) 4 MG tablet Take by mouth See Admin Instructions      vitamin B-12 (CYANOCOBALAMIN) 500 MCG tablet Take 500 mcg by mouth daily      mirtazapine (REMERON) 15 MG tablet Take 1 tablet by mouth nightly 30 tablet 3    senna (SENOKOT) 8.6 MG tablet Take 1 tablet by mouth 2 times daily 60 tablet 11    ferrous sulfate 325 (65 Fe) MG EC tablet Take 325 mg by mouth 2 times daily      prochlorperazine (COMPAZINE) 10 MG tablet Take 10 mg by mouth every 6 hours as needed      ipratropium-albuterol (DUONEB) 0.5-2.5 (3) MG/3ML SOLN nebulizer solution Inhale 1 vial into the lungs every 4 hours      potassium chloride (KLOR-CON M) 20 MEQ extended release tablet Take 20 mEq by mouth      hydrochlorothiazide (HYDRODIURIL) 25 MG tablet Take 25 mg by mouth daily      amLODIPine (NORVASC) 10 MG tablet Take 10 mg by mouth daily      glipiZIDE (GLUCOTROL) 5 MG tablet Take 5 mg by mouth daily      losartan (COZAAR) 50 MG tablet Take 50 mg by mouth daily       No current facility-administered medications on file prior to visit. Review of Systems   Constitutional: Negative for chills, fatigue, fever and unexpected weight change. HENT: Negative for congestion, mouth sores, sinus pain, sore throat and trouble swallowing. Respiratory: Positive for shortness of breath. Negative for cough, chest tightness and wheezing. Cardiovascular: Negative for chest pain, palpitations and leg swelling. Gastrointestinal: Positive for nausea. Negative for abdominal pain, diarrhea and vomiting. Endocrine: Negative for polydipsia, polyphagia and polyuria. Genitourinary: Negative for dysuria, flank pain, frequency and urgency.    Musculoskeletal: Positive for arthralgias and joint swelling. Negative for back pain, gait problem and myalgias. Left shoulder/arm and left side pain   Skin: Negative. Neurological: Negative for tremors, seizures, speech difficulty, weakness, numbness and headaches. Psychiatric/Behavioral: Negative for agitation, confusion, sleep disturbance and suicidal ideas. The patient is not nervous/anxious. Objective:   /80   Pulse 80   Temp 97.4 °F (36.3 °C)   Wt 176 lb 9.6 oz (80.1 kg)   BMI 33.37 kg/m²    Wt Readings from Last 3 Encounters:   09/09/20 176 lb 9.6 oz (80.1 kg)   06/24/20 157 lb (71.2 kg)   05/13/20 154 lb (69.9 kg)     Physical Exam  Constitutional:       Appearance: She is well-developed. HENT:      Head: Normocephalic and atraumatic. Eyes:      Pupils: Pupils are equal, round, and reactive to light. Neck:      Musculoskeletal: Normal range of motion and neck supple. Cardiovascular:      Rate and Rhythm: Normal rate and regular rhythm. Heart sounds: No murmur. No friction rub. No gallop. Pulmonary:      Effort: Pulmonary effort is normal.      Breath sounds: Normal breath sounds. No wheezing or rales. Comments: Diminished lung sounds  Chest:      Chest wall: No tenderness. Abdominal:      General: Bowel sounds are normal. There is no distension. Palpations: Abdomen is soft. Tenderness: There is no abdominal tenderness. There is no guarding. Musculoskeletal: Normal range of motion. General: No tenderness or deformity. Skin:     General: Skin is warm and dry. Findings: No erythema or rash. Neurological:      Mental Status: She is alert and oriented to person, place, and time. Motor: Weakness present. Coordination: Coordination abnormal.      Gait: Gait abnormal.         Assessment and Plan:      1.  Pain, neoplasm-related  -continue with Norco as ordered TID PRN  -gabapentin 100 Mg BID-reports tingling and some  numbness   -plan to have X-ray of done of left shoulder on her next doctor's appointment-per patient report  -Apply heat or ice to painful areas, whichever is preferred  - Perform gentle ROM exercises as tolerated    -Pt is tolerating current pain meds without adverse effects or over sedation. Lowest effective dose used. - advised patient to call if new symptoms develop including, dizziness, sedation, lethargy  Pt is able to maintain adequate functional level and participate in ADLs  OARRS reviewed. There is no indication of aberrant behavior  Pt advised to call for increasing or uncontrolled pain  Risk vs benefit assessed. Pt educated on risk of addiction. Pt advised to take only as prescribed and not to change frequency of pain meds without consulting palliative care first    2. SOB (shortness of breath)/ Malignant neoplasm of left lung, unspecified part of lung ([address]  -continue with chemo every two weeks  -follow up with oncology  3. Nausea  -managed on ZOfran    4. Weight gain  -small frequent meals  -avoid late night snacking  -exercise daily  -low calorie/ low fat/low carb meals  -will discontinue Remeron next visit  5. Other constipation  -managed    6. Palliative care encounter  -follow up in 3 weeks. There are no discontinued medications. Discussed with patient/surrogate: POC, Treatment risks/benefits, and alternatives including as noted above. All questions were answered. Gave much active listening, presence, and emotional support. Due to acuity, symptomatology and high-risk medication management,   I advised patient to follow up in 3 weeks. Total Time 30 mins   > 50% Time Spent Counseling/Care coordination?  yes     REINA Chow - NP    Collaborating Physician : Dr. Bry Felix

## 2020-10-01 ENCOUNTER — OFFICE VISIT (OUTPATIENT)
Dept: PALLATIVE CARE | Age: 55
End: 2020-10-01
Payer: COMMERCIAL

## 2020-10-01 PROCEDURE — 99348 HOME/RES VST EST LOW MDM 30: CPT | Performed by: NURSE PRACTITIONER

## 2020-10-01 RX ORDER — HYDROCODONE BITARTRATE AND ACETAMINOPHEN 5; 325 MG/1; MG/1
1 TABLET ORAL 3 TIMES DAILY PRN
Qty: 90 TABLET | Refills: 0 | Status: SHIPPED | OUTPATIENT
Start: 2020-10-01 | End: 2020-10-02 | Stop reason: SDUPTHER

## 2020-10-01 ASSESSMENT — ENCOUNTER SYMPTOMS
TROUBLE SWALLOWING: 0
VOMITING: 0
NAUSEA: 1
SORE THROAT: 0
COUGH: 0
ABDOMINAL PAIN: 0
DIARRHEA: 0
SHORTNESS OF BREATH: 1
CHEST TIGHTNESS: 0
BACK PAIN: 0
SINUS PAIN: 0
WHEEZING: 0

## 2020-10-01 NOTE — PROGRESS NOTES
Subjective: Id: Patient was consulted at home in [facility] for symptom management and goals of care discussion. Patient was accompanied by:self    No chief complaint on file. HPI      Patient was seen at home today for symptom management and goals of care discussion r/t her complex PMH which includes:Lung cancer, SOB, weight gain, nausea,neoplasm related pain. Patient presented today alert and oriented x3 . She has just finished chemotherapy treatment and is scheduled in two weeks again. She is scheduled for CT scan on 10/26. Current pain is about 4/10 in the left shoulder which is most likely from pervious radiation therapy. Current pain medication regimen is effective. Patient still complaining of nausea, mostly after chemo x 2-3 days and gets better thereafter. Managed with Zofran. Denies any SOB with rest,occasionally with activity/exertion. Used oxygen 2 times in the last month for about 30 minutes each time for SOB. Appetite is good. current weight is 178 lbs. Denies any diarrhea, vomiting. Occasional constipation but it is managed. Last BM this morning. Positive for anxiety. She reports is well managed. Does not wish to have any intervention. Planning to move to different apartment and somewhat anxious/excited about it. Another family member has  in the last two weeks. Walks without assist. Denies any suicidal/homicidal ideation.     Past Medical History:   Diagnosis Date    Abnormal Pap smear of cervix     Asthma     Bacterial vaginosis     Bipolar 1 disorder ([address]     CAD (coronary artery disease)     COPD (chronic obstructive pulmonary disease) (HCC)     chronic bronchitis    Diabetes ([address]     Ectopic pregnancy     Hypertension     Lung cancer ([address]     Obesity     Schizophrenia ([address]     Type 2 diabetes mellitus without complication ([address]      Past Surgical History:   Procedure Laterality Date    ABDOMEN SURGERY       SECTION      CHOLECYSTECTOMY      DILATION AND CURETTAGE OF UTERUS      TUBAL LIGATION      UMBILICAL HERNIA REPAIR       Social History     Socioeconomic History    Marital status:       Spouse name: Not on file    Number of children: Not on file    Years of education: Not on file    Highest education level: Not on file   Occupational History    Not on file   Social Needs    Financial resource strain: Not on file    Food insecurity     Worry: Not on file     Inability: Not on file    Transportation needs     Medical: Not on file     Non-medical: Not on file   Tobacco Use    Smoking status: Former Smoker     Packs/day: 1.50     Years: 38.00     Pack years: 57.00     Types: Cigarettes     Start date:      Last attempt to quit: 2019     Years since quittin.9    Smokeless tobacco: Never Used   Substance and Sexual Activity    Alcohol use: Not Currently    Drug use: Not Currently    Sexual activity: Yes   Lifestyle    Physical activity     Days per week: Not on file     Minutes per session: Not on file    Stress: Not on file   Relationships    Social connections     Talks on phone: Not on file     Gets together: Not on file     Attends Episcopal service: Not on file     Active member of club or organization: Not on file     Attends meetings of clubs or organizations: Not on file     Relationship status: Not on file    Intimate partner violence     Fear of current or ex partner: Not on file     Emotionally abused: Not on file     Physically abused: Not on file     Forced sexual activity: Not on file   Other Topics Concern    Not on file   Social History Narrative    Not on file     Family History   Problem Relation Age of Onset    Diabetes Mother     High Blood Pressure Mother     Coronary Art Dis Father     High Blood Pressure Father     Diabetes Father      Allergies   Allergen Reactions    Morphine Anaphylaxis    Demerol Hcl [Meperidine] Itching     Current Outpatient Medications on File Prior to Visit Medication Sig Dispense Refill    gabapentin (NEURONTIN) 100 MG capsule Take 1 capsule by mouth 2 times daily for 180 days. Intended supply: 90 days 180 capsule 1    methylPREDNISolone (MEDROL DOSEPACK) 4 MG tablet Take by mouth See Admin Instructions      vitamin B-12 (CYANOCOBALAMIN) 500 MCG tablet Take 500 mcg by mouth daily      mirtazapine (REMERON) 15 MG tablet Take 1 tablet by mouth nightly 30 tablet 3    senna (SENOKOT) 8.6 MG tablet Take 1 tablet by mouth 2 times daily 60 tablet 11    ferrous sulfate 325 (65 Fe) MG EC tablet Take 325 mg by mouth 2 times daily      prochlorperazine (COMPAZINE) 10 MG tablet Take 10 mg by mouth every 6 hours as needed      ipratropium-albuterol (DUONEB) 0.5-2.5 (3) MG/3ML SOLN nebulizer solution Inhale 1 vial into the lungs every 4 hours      potassium chloride (KLOR-CON M) 20 MEQ extended release tablet Take 20 mEq by mouth      hydrochlorothiazide (HYDRODIURIL) 25 MG tablet Take 25 mg by mouth daily      amLODIPine (NORVASC) 10 MG tablet Take 10 mg by mouth daily      glipiZIDE (GLUCOTROL) 5 MG tablet Take 5 mg by mouth daily      losartan (COZAAR) 50 MG tablet Take 50 mg by mouth daily       No current facility-administered medications on file prior to visit. Review of Systems   Constitutional: Negative for chills, fatigue, fever and unexpected weight change. HENT: Negative for congestion, mouth sores, sinus pain, sore throat and trouble swallowing. Respiratory: Positive for shortness of breath. Negative for cough, chest tightness and wheezing. Cardiovascular: Negative for chest pain, palpitations and leg swelling. Gastrointestinal: Positive for nausea. Negative for abdominal pain, diarrhea and vomiting. Endocrine: Negative for polydipsia, polyphagia and polyuria. Genitourinary: Negative for dysuria, flank pain, frequency and urgency. Musculoskeletal: Positive for arthralgias and joint swelling.  Negative for back pain, gait problem and myalgias. Left shoulder/arm and left side pain   Skin: Negative. Neurological: Negative for tremors, seizures, speech difficulty, weakness, numbness and headaches. Psychiatric/Behavioral: Negative for agitation, confusion, sleep disturbance and suicidal ideas. The patient is not nervous/anxious. Objective: There were no vitals taken for this visit. Wt Readings from Last 3 Encounters:   09/09/20 176 lb 9.6 oz (80.1 kg)   06/24/20 157 lb (71.2 kg)   05/13/20 154 lb (69.9 kg)     Physical Exam  Constitutional:       Appearance: She is well-developed. HENT:      Head: Normocephalic and atraumatic. Eyes:      Pupils: Pupils are equal, round, and reactive to light. Neck:      Musculoskeletal: Normal range of motion and neck supple. Cardiovascular:      Rate and Rhythm: Normal rate and regular rhythm. Heart sounds: No murmur. No friction rub. No gallop. Pulmonary:      Effort: Pulmonary effort is normal.      Breath sounds: Normal breath sounds. No wheezing or rales. Comments: Diminished lung sounds  Chest:      Chest wall: No tenderness. Abdominal:      General: Bowel sounds are normal. There is no distension. Palpations: Abdomen is soft. Tenderness: There is no abdominal tenderness. There is no guarding. Musculoskeletal: Normal range of motion. General: No tenderness or deformity. Skin:     General: Skin is warm and dry. Findings: No erythema or rash. Neurological:      Mental Status: She is alert and oriented to person, place, and time. Motor: Weakness present. Coordination: Coordination abnormal.      Gait: Gait abnormal.         Assessment and Plan:      1.  Pain, neoplasm-related  -continue with Norco as ordered TID PRN  -gabapentin 100 Mg BID routine-reports tingling and some  numbness   -Apply heat or ice to painful areas, whichever is preferred  - Perform gentle ROM exercises as tolerated    -Pt is tolerating current pain meds without adverse effects or over sedation. Lowest effective dose used. - advised patient to call if new symptoms develop including, dizziness, sedation, lethargy  Pt is able to maintain adequate functional level and participate in ADLs  OARRS reviewed. There is no indication of aberrant behavior  Pt advised to call for increasing or uncontrolled pain  Risk vs benefit assessed. Pt educated on risk of addiction. Pt advised to take only as prescribed and not to change frequency of pain meds without consulting palliative care first  - HYDROcodone-acetaminophen (NORCO) 5-325 MG per tablet; Take 1 tablet by mouth 3 times daily as needed for Pain for up to 30 days. Dispense: 90 tablet; Refill: 0    2. Other constipation  -currently managed  -does not take  senokot  3. SOB (shortness of breath)/ Neoplasm of Left lung  -has COPD Er pack in place  -chemotherapy every two weeks  - had chemo today  - CT scan scheduled this month  --continue with current POC  - notify provider if new symptoms develop ar current symptoms are worsening  - report to provider if develop fever, chills, productive cough, night sweats or increase in SOB  - use inhalers as directed and rinse mouth after each use  4. Nausea  - managed with ZOfran    5. Palliative care encounter  - follow up in 1 month.  -patient will be moving to different [address]  - HYDROcodone-acetaminophen (NORCO) 5-325 MG per tablet; Take 1 tablet by mouth 3 times daily as needed for Pain for up to 30 days. Dispense: 90 tablet; Refill: 0      Medications Discontinued During This Encounter   Medication Reason    HYDROcodone-acetaminophen ([address]) 5-325 MG per tablet REORDER       Discussed with patient/surrogate: POC, Treatment risks/benefits, and alternatives including as noted above. All questions were answered. Gave much active listening, presence, and emotional support.    Due to acuity, symptomatology and high-risk medication management,   I advised patient to follow up in 1 month. Total Time 30mins   > 50% Time Spent Counseling/Care coordination?  yes     REINA Valdes - DOMENICA    Collaborating Physician : Dr. Laura Gu

## 2020-10-02 RX ORDER — HYDROCODONE BITARTRATE AND ACETAMINOPHEN 5; 325 MG/1; MG/1
1 TABLET ORAL 3 TIMES DAILY PRN
Qty: 90 TABLET | Refills: 0 | Status: SHIPPED | OUTPATIENT
Start: 2020-10-02 | End: 2020-11-03 | Stop reason: SDUPTHER

## 2020-10-02 NOTE — TELEPHONE ENCOUNTER
Pt's insurance no longer covers Rx at [address] [place] [address] she would like this resent to [employer] [employer]

## 2020-11-02 NOTE — TELEPHONE ENCOUNTER
Rx requested:  Requested Prescriptions     Pending Prescriptions Disp Refills    HYDROcodone-acetaminophen (NORCO) 5-325 MG per tablet 90 tablet 0     Sig: Take 1 tablet by mouth 3 times daily as needed for Pain for up to 30 days.        Last Office Visit:   10/1/2020      Last filled:  10/2/2020    Next Visit Date:  Future Appointments   Date Time Provider Karolina Marin   11/18/2020  2:00 PM REINA Fraga - NP [facility]

## 2020-11-03 RX ORDER — HYDROCODONE BITARTRATE AND ACETAMINOPHEN 5; 325 MG/1; MG/1
1 TABLET ORAL 3 TIMES DAILY PRN
Qty: 90 TABLET | Refills: 0 | Status: SHIPPED | OUTPATIENT
Start: 2020-11-03 | End: 2020-12-02 | Stop reason: SDUPTHER

## 2020-12-02 RX ORDER — HYDROCODONE BITARTRATE AND ACETAMINOPHEN 5; 325 MG/1; MG/1
1 TABLET ORAL 3 TIMES DAILY PRN
Qty: 90 TABLET | Refills: 0 | Status: SHIPPED | OUTPATIENT
Start: 2020-12-02 | End: 2021-01-04 | Stop reason: SDUPTHER

## 2020-12-02 NOTE — TELEPHONE ENCOUNTER
Rx requested:  Requested Prescriptions     Pending Prescriptions Disp Refills    HYDROcodone-acetaminophen (NORCO) 5-325 MG per tablet 90 tablet 0     Sig: Take 1 tablet by mouth 3 times daily as needed for Pain for up to 30 days.        Last Office Visit:   10/1/2020      Last filled:  11/3/2020    Next Visit Date:  Future Appointments   Date Time Provider Karolina Marin   1/15/2021  9:00 AM Judson Villatoro

## 2021-01-04 DIAGNOSIS — G89.3 PAIN, NEOPLASM-RELATED: ICD-10-CM

## 2021-01-04 DIAGNOSIS — K59.09 OTHER CONSTIPATION: ICD-10-CM

## 2021-01-04 DIAGNOSIS — Z51.5 PALLIATIVE CARE ENCOUNTER: ICD-10-CM

## 2021-01-04 RX ORDER — HYDROCODONE BITARTRATE AND ACETAMINOPHEN 5; 325 MG/1; MG/1
1 TABLET ORAL 3 TIMES DAILY PRN
Qty: 90 TABLET | Refills: 0 | Status: SHIPPED | OUTPATIENT
Start: 2021-01-04 | End: 2021-01-06 | Stop reason: SDUPTHER

## 2021-01-04 NOTE — TELEPHONE ENCOUNTER
Rx requested:  Requested Prescriptions     Pending Prescriptions Disp Refills    HYDROcodone-acetaminophen (NORCO) 5-325 MG per tablet 90 tablet 0     Sig: Take 1 tablet by mouth 3 times daily as needed for Pain for up to 30 days. Last Office Visit:   10/1/2020      Last filled:  12/2/2020    Next Visit Date:  Future Appointments   Date Time Provider Karolina Marin   1/15/2021  9:00 AM REINA Gutierrez CNP [facility] EM[facility]GENC[facility] [place]   .

## 2021-01-05 DIAGNOSIS — Z51.5 PALLIATIVE CARE ENCOUNTER: ICD-10-CM

## 2021-01-05 DIAGNOSIS — K59.09 OTHER CONSTIPATION: ICD-10-CM

## 2021-01-05 DIAGNOSIS — G89.3 PAIN, NEOPLASM-RELATED: ICD-10-CM

## 2021-01-06 RX ORDER — HYDROCODONE BITARTRATE AND ACETAMINOPHEN 5; 325 MG/1; MG/1
1 TABLET ORAL 3 TIMES DAILY PRN
Qty: 90 TABLET | Refills: 0 | Status: SHIPPED | OUTPATIENT
Start: 2021-01-06 | End: 2021-02-04 | Stop reason: SDUPTHER

## 2021-01-15 ENCOUNTER — VIRTUAL VISIT (OUTPATIENT)
Dept: PALLATIVE CARE | Age: 56
End: 2021-01-15
Payer: COMMERCIAL

## 2021-01-15 DIAGNOSIS — J45.909 ASTHMA, UNSPECIFIED ASTHMA SEVERITY, UNSPECIFIED WHETHER COMPLICATED, UNSPECIFIED WHETHER PERSISTENT: ICD-10-CM

## 2021-01-15 DIAGNOSIS — K59.09 OTHER CONSTIPATION: ICD-10-CM

## 2021-01-15 DIAGNOSIS — C34.92 MALIGNANT NEOPLASM OF LEFT LUNG, UNSPECIFIED PART OF LUNG (HCC): ICD-10-CM

## 2021-01-15 DIAGNOSIS — G89.3 PAIN, NEOPLASM-RELATED: Primary | ICD-10-CM

## 2021-01-15 DIAGNOSIS — J44.9 CHRONIC OBSTRUCTIVE PULMONARY DISEASE, UNSPECIFIED COPD TYPE (HCC): ICD-10-CM

## 2021-01-15 DIAGNOSIS — Z51.5 PALLIATIVE CARE ENCOUNTER: ICD-10-CM

## 2021-01-15 DIAGNOSIS — R06.02 SOB (SHORTNESS OF BREATH): ICD-10-CM

## 2021-01-15 PROCEDURE — 99441 PR PHYS/QHP TELEPHONE EVALUATION 5-10 MIN: CPT | Performed by: NURSE PRACTITIONER

## 2021-01-15 NOTE — PROGRESS NOTES
Subjective:      TELEHEALTH EVALUATION -- Audio/Visual (During UTZRM-89 public health emergency)     Due to COVID 19 outbreak, patient's office visit was converted to a virtual visit. Patient was contacted and agreed to proceed with a virtual visit via Telephone Visit  The risks and benefits of converting to a virtual visit were discussed in light of the current infectious disease epidemic. Patient also understood that insurance coverage and co-pays are up to their individual insurance plans. Patient Id: Seen Perry Mcguire at  home in [facility] via [id number] for follow up for symptom management. She was accompanied to the appointment by: Herself. Chief Complaint   Patient presents with    Pain    Follow-up        HPI  Ector Chandra is a 63 y.o. female seen and examined for symptomatic mangement related to Lung cancer. Home visit is necessary in lieu of office due to significant frailty and high symptom burden from comorbid illnesses. Pt is calm, cooperative and in NAD. Patient seen and examined via VV. She reports that her pain today is 2/10. She has been taking Norco and gabapentin which has been effective in managing her pain. Denies having any side effects. She tells me that her SOB is at baseline. She is having regular bowel movements. Denies significant sleep disturbance, depression, anxiety, or agitation episodes; denies suicidal or homicidal ideation or signs suggesting existential grief or spiritual pain.        Past Medical History:   Diagnosis Date    Abnormal Pap smear of cervix     Asthma     Bacterial vaginosis     Bipolar 1 disorder ([address]     CAD (coronary artery disease)     COPD (chronic obstructive pulmonary disease) (HCC)     chronic bronchitis    Diabetes ([address]     Ectopic pregnancy     Hypertension     Lung cancer ([address]     Obesity     Schizophrenia ([address]     Type 2 diabetes mellitus without complication ([address]      Past Surgical History:   Procedure Laterality Date  ABDOMEN SURGERY       SECTION      CHOLECYSTECTOMY      DILATION AND CURETTAGE OF UTERUS      TUBAL LIGATION      UMBILICAL HERNIA REPAIR       Social History     Socioeconomic History    Marital status:       Spouse name: Not on file    Number of children: Not on file    Years of education: Not on file    Highest education level: Not on file   Occupational History    Not on file   Social Needs    Financial resource strain: Not on file    Food insecurity     Worry: Not on file     Inability: Not on file    Transportation needs     Medical: Not on file     Non-medical: Not on file   Tobacco Use    Smoking status: Former Smoker     Packs/day: 1.50     Years: 38.00     Pack years: 57.00     Types: Cigarettes     Start date: 45     Quit date: 2019     Years since quittin.2    Smokeless tobacco: Never Used   Substance and Sexual Activity    Alcohol use: Not Currently    Drug use: Not Currently    Sexual activity: Yes   Lifestyle    Physical activity     Days per week: Not on file     Minutes per session: Not on file    Stress: Not on file   Relationships    Social connections     Talks on phone: Not on file     Gets together: Not on file     Attends Jain service: Not on file     Active member of club or organization: Not on file     Attends meetings of clubs or organizations: Not on file     Relationship status: Not on file    Intimate partner violence     Fear of current or ex partner: Not on file     Emotionally abused: Not on file     Physically abused: Not on file     Forced sexual activity: Not on file   Other Topics Concern    Not on file   Social History Narrative    Not on file     Family History   Problem Relation Age of Onset    Diabetes Mother     High Blood Pressure Mother     Coronary Art Dis Father     High Blood Pressure Father     Diabetes Father      Allergies   Allergen Reactions    Morphine Anaphylaxis    Demerol Hcl [Meperidine] Itching

## 2021-01-20 PROBLEM — C34.90 SCC (SQUAMOUS CELL CARCINOMA OF LUNG) (HCC): Status: ACTIVE | Noted: 2020-01-06

## 2021-01-20 PROBLEM — F20.9 SCHIZOPHRENIA (HCC): Status: ACTIVE | Noted: 2021-01-20

## 2021-01-20 PROBLEM — F17.200 SMOKER: Status: ACTIVE | Noted: 2021-01-20

## 2021-01-20 PROBLEM — E11.9 DIABETES (HCC): Status: ACTIVE | Noted: 2021-01-20

## 2021-01-20 PROBLEM — E66.9 OBESITY (BMI 35.0-39.9 WITHOUT COMORBIDITY): Status: ACTIVE | Noted: 2021-01-20

## 2021-01-20 ASSESSMENT — ENCOUNTER SYMPTOMS
COUGH: 0
WHEEZING: 0
SINUS PAIN: 0
BACK PAIN: 0
SHORTNESS OF BREATH: 1
ABDOMINAL PAIN: 0
TROUBLE SWALLOWING: 0
CHEST TIGHTNESS: 0
EYES NEGATIVE: 1
VOMITING: 0
ALLERGIC/IMMUNOLOGIC NEGATIVE: 1
NAUSEA: 1
DIARRHEA: 0
SORE THROAT: 0

## 2021-02-03 DIAGNOSIS — Z51.5 PALLIATIVE CARE ENCOUNTER: ICD-10-CM

## 2021-02-03 DIAGNOSIS — G89.3 PAIN, NEOPLASM-RELATED: ICD-10-CM

## 2021-02-03 DIAGNOSIS — K59.09 OTHER CONSTIPATION: ICD-10-CM

## 2021-02-03 NOTE — TELEPHONE ENCOUNTER
Rx requested:  Requested Prescriptions     Pending Prescriptions Disp Refills    HYDROcodone-acetaminophen (NORCO) 5-325 MG per tablet 90 tablet 0     Sig: Take 1 tablet by mouth 3 times daily as needed for Pain for up to 30 days.        Last Office Visit:   1/15/2021      Last filled:  1/6/2021    Next Visit Date:  Future Appointments   Date Time Provider Karolina Marin   2/11/2021  1:00 PM REINA Riley - PRASAD Johnson

## 2021-02-04 RX ORDER — HYDROCODONE BITARTRATE AND ACETAMINOPHEN 5; 325 MG/1; MG/1
1 TABLET ORAL 3 TIMES DAILY PRN
Qty: 90 TABLET | Refills: 0 | Status: SHIPPED | OUTPATIENT
Start: 2021-02-04 | End: 2021-03-03 | Stop reason: SDUPTHER

## 2021-03-03 DIAGNOSIS — G89.3 PAIN, NEOPLASM-RELATED: ICD-10-CM

## 2021-03-03 DIAGNOSIS — Z51.5 PALLIATIVE CARE ENCOUNTER: ICD-10-CM

## 2021-03-03 DIAGNOSIS — K59.09 OTHER CONSTIPATION: ICD-10-CM

## 2021-03-03 RX ORDER — HYDROCODONE BITARTRATE AND ACETAMINOPHEN 5; 325 MG/1; MG/1
1 TABLET ORAL 3 TIMES DAILY PRN
Qty: 90 TABLET | Refills: 0 | Status: SHIPPED | OUTPATIENT
Start: 2021-03-03 | End: 2021-04-05 | Stop reason: SDUPTHER

## 2021-03-03 NOTE — TELEPHONE ENCOUNTER
Patient is  requesting medication refill. Please approve or deny this request.    Rx requested:  Requested Prescriptions     Pending Prescriptions Disp Refills    HYDROcodone-acetaminophen (NORCO) 5-325 MG per tablet 90 tablet 0     Sig: Take 1 tablet by mouth 3 times daily as needed for Pain for up to 30 days. Last Office Visit:   2/11/2021      Next Visit Date:  No future appointments.

## 2021-04-05 DIAGNOSIS — G89.3 PAIN, NEOPLASM-RELATED: ICD-10-CM

## 2021-04-05 DIAGNOSIS — K59.09 OTHER CONSTIPATION: ICD-10-CM

## 2021-04-05 DIAGNOSIS — Z51.5 PALLIATIVE CARE ENCOUNTER: ICD-10-CM

## 2021-04-05 RX ORDER — HYDROCODONE BITARTRATE AND ACETAMINOPHEN 5; 325 MG/1; MG/1
1 TABLET ORAL 3 TIMES DAILY PRN
Qty: 90 TABLET | Refills: 0 | Status: SHIPPED | OUTPATIENT
Start: 2021-04-05 | End: 2021-05-04 | Stop reason: SDUPTHER

## 2021-05-04 DIAGNOSIS — K59.09 OTHER CONSTIPATION: ICD-10-CM

## 2021-05-04 DIAGNOSIS — Z51.5 PALLIATIVE CARE ENCOUNTER: ICD-10-CM

## 2021-05-04 DIAGNOSIS — G89.3 PAIN, NEOPLASM-RELATED: ICD-10-CM

## 2021-05-04 RX ORDER — HYDROCODONE BITARTRATE AND ACETAMINOPHEN 5; 325 MG/1; MG/1
1 TABLET ORAL 3 TIMES DAILY PRN
Qty: 45 TABLET | Refills: 0 | Status: SHIPPED | OUTPATIENT
Start: 2021-05-04 | End: 2021-05-19

## 2021-05-04 NOTE — TELEPHONE ENCOUNTER
Must have an appointment before any more controlled substance refills, patient is aware of this
done

## 2021-05-20 ENCOUNTER — OFFICE VISIT (OUTPATIENT)
Dept: PALLATIVE CARE | Age: 56
End: 2021-05-20
Payer: COMMERCIAL

## 2021-05-20 DIAGNOSIS — K59.03 CONSTIPATION DUE TO PAIN MEDICATION: ICD-10-CM

## 2021-05-20 DIAGNOSIS — G89.3 NEOPLASM RELATED PAIN: Primary | ICD-10-CM

## 2021-05-20 DIAGNOSIS — Z51.5 PALLIATIVE CARE ENCOUNTER: ICD-10-CM

## 2021-05-20 DIAGNOSIS — F31.70 BIPOLAR AFFECTIVE DISORDER IN REMISSION (HCC): ICD-10-CM

## 2021-05-20 DIAGNOSIS — C34.90 MALIGNANT NEOPLASM OF LUNG, UNSPECIFIED LATERALITY, UNSPECIFIED PART OF LUNG (HCC): ICD-10-CM

## 2021-05-20 DIAGNOSIS — F20.9 SCHIZOPHRENIA IN REMISSION (HCC): ICD-10-CM

## 2021-05-20 PROCEDURE — G8427 DOCREV CUR MEDS BY ELIG CLIN: HCPCS | Performed by: NURSE PRACTITIONER

## 2021-05-20 PROCEDURE — 1036F TOBACCO NON-USER: CPT | Performed by: NURSE PRACTITIONER

## 2021-05-20 PROCEDURE — 3017F COLORECTAL CA SCREEN DOC REV: CPT | Performed by: NURSE PRACTITIONER

## 2021-05-20 PROCEDURE — 99214 OFFICE O/P EST MOD 30 MIN: CPT | Performed by: NURSE PRACTITIONER

## 2021-05-20 PROCEDURE — G8417 CALC BMI ABV UP PARAM F/U: HCPCS | Performed by: NURSE PRACTITIONER

## 2021-05-20 RX ORDER — PREGABALIN 25 MG/1
25 CAPSULE ORAL 3 TIMES DAILY
Qty: 90 CAPSULE | Refills: 0 | Status: SHIPPED | OUTPATIENT
Start: 2021-05-20 | End: 2021-06-17

## 2021-05-20 RX ORDER — HYDROCODONE BITARTRATE AND ACETAMINOPHEN 5; 325 MG/1; MG/1
1 TABLET ORAL EVERY 8 HOURS PRN
Qty: 90 TABLET | Refills: 0 | Status: SHIPPED | OUTPATIENT
Start: 2021-05-20 | End: 2021-06-21 | Stop reason: SDUPTHER

## 2021-05-20 RX ORDER — POLYETHYLENE GLYCOL 3350 17 G/17G
17 POWDER, FOR SOLUTION ORAL DAILY
COMMUNITY

## 2021-05-20 ASSESSMENT — ENCOUNTER SYMPTOMS
VOMITING: 0
CHEST TIGHTNESS: 0
STRIDOR: 0
EYE DISCHARGE: 0
COLOR CHANGE: 0
WHEEZING: 0
BACK PAIN: 0
CHOKING: 0
BLOOD IN STOOL: 0
VOICE CHANGE: 0
COUGH: 0
ANAL BLEEDING: 0
CONSTIPATION: 0
NAUSEA: 0
SORE THROAT: 0
ABDOMINAL DISTENTION: 0
DIARRHEA: 0
APNEA: 0
TROUBLE SWALLOWING: 0
SHORTNESS OF BREATH: 0
ABDOMINAL PAIN: 0
RECTAL PAIN: 0

## 2021-05-21 ENCOUNTER — TELEPHONE (OUTPATIENT)
Dept: BEHAVIORAL/MENTAL HEALTH CLINIC | Age: 56
End: 2021-05-21

## 2021-05-21 DIAGNOSIS — Z79.899 HIGH RISK MEDICATION USE: ICD-10-CM

## 2021-05-21 DIAGNOSIS — F31.70 BIPOLAR DISORDER IN REMISSION (HCC): ICD-10-CM

## 2021-05-21 DIAGNOSIS — F20.9 SCHIZOPHRENIA IN REMISSION (HCC): Primary | ICD-10-CM

## 2021-05-21 NOTE — TELEPHONE ENCOUNTER
[phone]  [facility]  no new patient appointments available with Dr. Romulo Mccormick.

## 2021-06-17 ENCOUNTER — OFFICE VISIT (OUTPATIENT)
Dept: PALLATIVE CARE | Age: 56
End: 2021-06-17
Payer: COMMERCIAL

## 2021-06-17 VITALS
HEART RATE: 70 BPM | DIASTOLIC BLOOD PRESSURE: 72 MMHG | RESPIRATION RATE: 18 BRPM | OXYGEN SATURATION: 96 % | SYSTOLIC BLOOD PRESSURE: 134 MMHG

## 2021-06-17 DIAGNOSIS — G62.9 NEUROPATHY: ICD-10-CM

## 2021-06-17 DIAGNOSIS — C34.92 MALIGNANT NEOPLASM OF LEFT LUNG, UNSPECIFIED PART OF LUNG (HCC): Primary | ICD-10-CM

## 2021-06-17 DIAGNOSIS — R06.2 WHEEZE: ICD-10-CM

## 2021-06-17 DIAGNOSIS — F31.9 BIPOLAR 1 DISORDER (HCC): ICD-10-CM

## 2021-06-17 DIAGNOSIS — K59.03 CONSTIPATION DUE TO PAIN MEDICATION: ICD-10-CM

## 2021-06-17 DIAGNOSIS — F20.9 SCHIZOPHRENIA, UNSPECIFIED TYPE (HCC): ICD-10-CM

## 2021-06-17 DIAGNOSIS — G89.3 NEOPLASM RELATED PAIN: ICD-10-CM

## 2021-06-17 PROCEDURE — 99214 OFFICE O/P EST MOD 30 MIN: CPT | Performed by: NURSE PRACTITIONER

## 2021-06-17 PROCEDURE — G8417 CALC BMI ABV UP PARAM F/U: HCPCS | Performed by: NURSE PRACTITIONER

## 2021-06-17 PROCEDURE — 1036F TOBACCO NON-USER: CPT | Performed by: NURSE PRACTITIONER

## 2021-06-17 PROCEDURE — G8427 DOCREV CUR MEDS BY ELIG CLIN: HCPCS | Performed by: NURSE PRACTITIONER

## 2021-06-17 PROCEDURE — 3017F COLORECTAL CA SCREEN DOC REV: CPT | Performed by: NURSE PRACTITIONER

## 2021-06-17 ASSESSMENT — ENCOUNTER SYMPTOMS
CHEST TIGHTNESS: 0
WHEEZING: 0
COLOR CHANGE: 0
ANAL BLEEDING: 0
RECTAL PAIN: 0
SHORTNESS OF BREATH: 0
BLOOD IN STOOL: 0
VOMITING: 0
TROUBLE SWALLOWING: 0
BACK PAIN: 0
COUGH: 0
DIARRHEA: 0
NAUSEA: 0
ABDOMINAL PAIN: 0
SORE THROAT: 0
EYE DISCHARGE: 0
VOICE CHANGE: 0
ABDOMINAL DISTENTION: 0
STRIDOR: 0
CHOKING: 0
CONSTIPATION: 0
APNEA: 0

## 2021-06-17 NOTE — PROGRESS NOTES
Subjective:      Patient Id:  Frankie Oliva is a 61 y.o. female who presents today with No chief complaint on file. HPI     Seen at [facility] clinic for symptom management follow up rt Lung Cancer, COPD, CAD, DM 2, HTN, HLD. Bipolar 1, schizophrenia, and history of alcohol abuse. Today demeanor calm and relaxed, NAD, no sedation. Being treated by Dr Aashish Choudhary of [facility] for [facility] of left lung    resection of a soft tissue mass form the abdominal wall with pah showing poorly differentiated carcinoma with squamous features on 6/11/19. Pt seen in oncology >4 months later, after hospitalization at [facility] for CP. Initial CXR showed a L suprahilar mass and CT chest obtained on 11//19 showed an approximately 10cm L upper chest mass with associated encasement and narrowing of L hilar vessels and central airways c/w malignancy/bronchogenic carcinoma until proven otherwise. Small L pleural effusion. \" No evidence of metastatic disease on CT a/p except for trace nonspecific fluid in the pelvis. PET/CT as well as bronch with EBUS ordered Confirming diseaes only in the large L upper chest mass (all bx of mediastinal nodes negative for malignancy). Pt's case was discussed in tumor board meeting on 1/6/19 with consensus recommendation to proceed with aggressive tx with chemo/RT given now no evidence of metastatic disease and overall relatively indolent course. Pt completed concurrent chemo/RT with weekly carbo/taxol on 3/6/20. Maintenance tx with durvalumab started on 4/16/20. Recent ct scan shows stable disease    Complains of pain in left upper chest area, began after radiation, described as a burning, often radiates down her fingertips and down her thoracic back. Pain is worse in am and with movement. She avoids using the arm though stregth and  are equal with right. She has not tried PT, \" My doctor told me it would not help\". Crackles are auscultated under area of pain.  She tried gabapentin in the past Socioeconomic History    Marital status:      Spouse name: Not on file    Number of children: Not on file    Years of education: Not on file    Highest education level: Not on file   Occupational History    Not on file   Tobacco Use    Smoking status: Former Smoker     Packs/day: 1.50     Years: 38.00     Pack years: 57.00     Types: Cigarettes     Start date: 45     Quit date: 2019     Years since quittin.6    Smokeless tobacco: Never Used   Vaping Use    Vaping Use: Never used   Substance and Sexual Activity    Alcohol use: Not Currently    Drug use: Not Currently    Sexual activity: Yes   Other Topics Concern    Not on file   Social History Narrative    Not on file     Social Determinants of Health     Financial Resource Strain:     Difficulty of Paying Living Expenses:    Food Insecurity:     Worried About Running Out of Food in the Last Year:     [address] in the Last Year:    Transportation Needs:     Lack of Transportation (Medical):      Lack of Transportation (Non-Medical):    Physical Activity:     Days of Exercise per Week:     Minutes of Exercise per Session:    Stress:     Feeling of Stress :    Social Connections:     Frequency of Communication with Friends and Family:     Frequency of Social Gatherings with Friends and Family:     Attends Jain Services:     Active Member of Clubs or Organizations:     Attends Club or Organization Meetings:     Marital Status:    Intimate Partner Violence:     Fear of Current or Ex-Partner:     Emotionally Abused:     Physically Abused:     Sexually Abused:      Family History   Problem Relation Age of Onset    Diabetes Mother     High Blood Pressure Mother     Coronary Art Dis Father     High Blood Pressure Father     Diabetes Father      Allergies   Allergen Reactions    Morphine Anaphylaxis    Demerol Hcl [Meperidine] Itching    Lyrica [Pregabalin] Other (See Comments)     Dizzy, hallucinations depression, mood swings    Gabapentin Other (See Comments)     Agitation, anxiety, mood swings     Current Outpatient Medications on File Prior to Visit   Medication Sig Dispense Refill    HYDROcodone-acetaminophen (NORCO) 5-325 MG per tablet Take 1 tablet by mouth every 8 hours as needed for Pain for up to 30 days. 90 tablet 0    polyethylene glycol (MIRALAX) 17 g PACK packet Take 17 g by mouth daily      ipratropium-albuterol (DUONEB) 0.5-2.5 (3) MG/3ML SOLN nebulizer solution Inhale 1 vial into the lungs every 4 hours      potassium chloride (KLOR-CON M) 20 MEQ extended release tablet Take 20 mEq by mouth      amLODIPine (NORVASC) 10 MG tablet Take 10 mg by mouth daily      glipiZIDE (GLUCOTROL) 5 MG tablet Take 5 mg by mouth daily      losartan (COZAAR) 50 MG tablet Take 50 mg by mouth daily       No current facility-administered medications on file prior to visit. Review of Systems   Constitutional: Negative for activity change, appetite change, chills, diaphoresis, fatigue, fever and unexpected weight change. HENT: Negative for drooling, hearing loss, mouth sores, sore throat, trouble swallowing and voice change. Eyes: Negative for discharge and visual disturbance. Respiratory: Negative for apnea, cough, choking, chest tightness, shortness of breath, wheezing and stridor. Cardiovascular: Negative for chest pain, palpitations and leg swelling. Gastrointestinal: Negative for abdominal distention, abdominal pain, anal bleeding, blood in stool, constipation, diarrhea, nausea, rectal pain and vomiting. Genitourinary: Negative for difficulty urinating, dysuria, enuresis, frequency and hematuria. Musculoskeletal: Negative for arthralgias, back pain, gait problem, joint swelling and myalgias. Skin: Negative for color change, pallor, rash and wound. Allergic/Immunologic: Negative for food allergies and immunocompromised state.    Neurological: Negative for dizziness, tremors, seizures, syncope, facial asymmetry, speech difficulty, weakness, light-headedness, numbness and headaches. Hematological: Negative for adenopathy. Does not bruise/bleed easily. Psychiatric/Behavioral: Negative for agitation, behavioral problems, confusion, decreased concentration, dysphoric mood, hallucinations, self-injury, sleep disturbance and suicidal ideas. The patient is not nervous/anxious and is not hyperactive. Objective: There were no vitals taken for this visit. Physical Exam  Constitutional:       General: She is not in acute distress. Appearance: She is well-developed. She is not diaphoretic. HENT:      Head: Normocephalic and atraumatic. Right Ear: External ear normal.      Left Ear: External ear normal.      Nose: Nose normal.      Mouth/Throat:      Pharynx: No oropharyngeal exudate. Eyes:      General: No scleral icterus. Right eye: No discharge. Left eye: No discharge. Conjunctiva/sclera: Conjunctivae normal.      Pupils: Pupils are equal, round, and reactive to light. Neck:      Thyroid: No thyromegaly. Vascular: No JVD. Trachea: No tracheal deviation. Cardiovascular:      Rate and Rhythm: Normal rate and regular rhythm. Heart sounds: Normal heart sounds. Pulmonary:      Effort: Pulmonary effort is normal. No respiratory distress. Breath sounds: No stridor. Wheezing present. No rales. Chest:      Chest wall: No tenderness. Abdominal:      General: Bowel sounds are normal. There is no distension. Palpations: Abdomen is soft. There is no mass. Tenderness: There is no abdominal tenderness. There is no guarding or rebound. Musculoskeletal:         General: No tenderness or deformity. Normal range of motion. Cervical back: Normal range of motion and neck supple. Lymphadenopathy:      Cervical: No cervical adenopathy. Skin:     General: Skin is warm and dry.       Capillary Refill: Capillary refill takes less than 2 seconds. Findings: No erythema or rash. Neurological:      Mental Status: She is alert and oriented to person, place, and time. Psychiatric:         Behavior: Behavior normal.         Thought Content: Thought content normal.           Assessment:       Diagnosis Orders   1. Malignant neoplasm of left lung, unspecified part of lung (HCC)     2. Schizophrenia, unspecified type ([facility] [address]     3. Neoplasm related pain     4. Constipation due to pain medication     5. Neuropathy     6. Bipolar 1 disorder ([facility] [address]     7. Wheeze            Plan:    Pain rt neoplasm and neuropathy post radiation  - Continue  Norco 5/325 mg po every 8  Hours prn moderate to severe pain  - Heat or ice to painful areas, whichever is preferred  - Gentle ROM exercises  Pt is tolerating current pain meds without adverse effects or over sedation. Lowest effective dose used. Pt advised to call and notify palliative care for any adverse effects or sedation  Pt is able to maintain adequate functional level and participate in ADLs  OARRS reviewed. There is no indication of aberrant behavior  Pt advised to call for increasing or uncontrolled pain  Risk vs benefit assessed. Pt educated on risk of addiction. Pt advised to take only as prescribed and not to change frequency of pain meds without consulting palliative care first.    Bipolar 1 and Schizophrenia in remission  - Referral to psych as she is using high risk medications and she is undergoing a high stress medical treatment. She seems to be doing exceptionally well, but would like expert evaluation    Slow Transit Constipation due to opioid use  - Continue current POC  - Increase fluid and fiber in diet  - Exercise as tolerated  - Hold all for loose stool  - Call if no BM in three days    Wheezing/ Crackles  - Continue Duo neb treatments  - follow with pulmonology  - Call for increased SOB, cough, sputum production, fatigue.       Veronika Braden, APRN - CNP

## 2021-06-21 DIAGNOSIS — Z51.5 PALLIATIVE CARE ENCOUNTER: ICD-10-CM

## 2021-06-21 DIAGNOSIS — G89.3 NEOPLASM RELATED PAIN: ICD-10-CM

## 2021-06-21 DIAGNOSIS — C34.90 MALIGNANT NEOPLASM OF LUNG, UNSPECIFIED LATERALITY, UNSPECIFIED PART OF LUNG (HCC): ICD-10-CM

## 2021-06-21 RX ORDER — HYDROCODONE BITARTRATE AND ACETAMINOPHEN 5; 325 MG/1; MG/1
1 TABLET ORAL EVERY 8 HOURS PRN
Qty: 90 TABLET | Refills: 0 | Status: SHIPPED | OUTPATIENT
Start: 2021-06-21 | End: 2021-07-20 | Stop reason: SDUPTHER

## 2021-06-21 NOTE — TELEPHONE ENCOUNTER
Rx requested:  Requested Prescriptions     Pending Prescriptions Disp Refills    HYDROcodone-acetaminophen (NORCO) 5-325 MG per tablet 90 tablet 0     Sig: Take 1 tablet by mouth every 8 hours as needed for Pain for up to 30 days.        Last Office Visit:   6/17/2021      Last filled:  5/20/2021    Next Visit Date:  Future Appointments   Date Time Provider Karolina Marin   8/26/2021  1:00 PM REINA Siegel - CNP [facility]

## 2021-07-20 DIAGNOSIS — C34.90 MALIGNANT NEOPLASM OF LUNG, UNSPECIFIED LATERALITY, UNSPECIFIED PART OF LUNG (HCC): ICD-10-CM

## 2021-07-20 DIAGNOSIS — Z51.5 PALLIATIVE CARE ENCOUNTER: ICD-10-CM

## 2021-07-20 DIAGNOSIS — G89.3 NEOPLASM RELATED PAIN: ICD-10-CM

## 2021-07-20 RX ORDER — HYDROCODONE BITARTRATE AND ACETAMINOPHEN 5; 325 MG/1; MG/1
1 TABLET ORAL EVERY 8 HOURS PRN
Qty: 90 TABLET | Refills: 0 | Status: SHIPPED | OUTPATIENT
Start: 2021-07-20 | End: 2021-08-20 | Stop reason: SDUPTHER

## 2021-07-20 NOTE — TELEPHONE ENCOUNTER
Rx requested:  Requested Prescriptions     Pending Prescriptions Disp Refills    HYDROcodone-acetaminophen (NORCO) 5-325 MG per tablet 90 tablet 0     Sig: Take 1 tablet by mouth every 8 hours as needed for Pain for up to 30 days.        Last Office Visit:   6/17/2021      Last filled:  6/21/2021    Next Visit Date:  Future Appointments   Date Time Provider Karolina Marin   8/26/2021  1:00 PM REINA Hudson - CNP [address] [facility]

## 2021-08-20 DIAGNOSIS — Z51.5 PALLIATIVE CARE ENCOUNTER: ICD-10-CM

## 2021-08-20 DIAGNOSIS — C34.90 MALIGNANT NEOPLASM OF LUNG, UNSPECIFIED LATERALITY, UNSPECIFIED PART OF LUNG (HCC): ICD-10-CM

## 2021-08-20 DIAGNOSIS — G89.3 NEOPLASM RELATED PAIN: ICD-10-CM

## 2021-08-20 RX ORDER — HYDROCODONE BITARTRATE AND ACETAMINOPHEN 5; 325 MG/1; MG/1
1 TABLET ORAL EVERY 8 HOURS PRN
Qty: 90 TABLET | Refills: 0 | Status: SHIPPED | OUTPATIENT
Start: 2021-08-20 | End: 2021-09-20 | Stop reason: SDUPTHER

## 2021-08-20 NOTE — TELEPHONE ENCOUNTER
Rx requested:  Requested Prescriptions     Pending Prescriptions Disp Refills    HYDROcodone-acetaminophen (NORCO) 5-325 MG per tablet 90 tablet 0     Sig: Take 1 tablet by mouth every 8 hours as needed for Pain for up to 30 days.        Last Office Visit:   6/17/2021      Last filled:  7/20/2021    Next Visit Date:  Future Appointments   Date Time Provider Karolina Marin   8/26/2021  1:00 PM REINA Avalos - CNP [address] [facility]

## 2021-08-26 ENCOUNTER — OFFICE VISIT (OUTPATIENT)
Dept: PALLATIVE CARE | Age: 56
End: 2021-08-26
Payer: COMMERCIAL

## 2021-08-26 VITALS
RESPIRATION RATE: 18 BRPM | HEART RATE: 70 BPM | SYSTOLIC BLOOD PRESSURE: 117 MMHG | OXYGEN SATURATION: 98 % | DIASTOLIC BLOOD PRESSURE: 64 MMHG

## 2021-08-26 DIAGNOSIS — G89.3 NEOPLASM RELATED PAIN: ICD-10-CM

## 2021-08-26 DIAGNOSIS — Z51.5 PALLIATIVE CARE PATIENT: Primary | ICD-10-CM

## 2021-08-26 DIAGNOSIS — C34.90 SQUAMOUS CELL CARCINOMA OF LUNG, UNSPECIFIED LATERALITY (HCC): ICD-10-CM

## 2021-08-26 DIAGNOSIS — K59.03 CONSTIPATION DUE TO PAIN MEDICATION: ICD-10-CM

## 2021-08-26 DIAGNOSIS — F31.9 BIPOLAR 1 DISORDER (HCC): ICD-10-CM

## 2021-08-26 PROCEDURE — 1036F TOBACCO NON-USER: CPT | Performed by: NURSE PRACTITIONER

## 2021-08-26 PROCEDURE — G8417 CALC BMI ABV UP PARAM F/U: HCPCS | Performed by: NURSE PRACTITIONER

## 2021-08-26 PROCEDURE — G8427 DOCREV CUR MEDS BY ELIG CLIN: HCPCS | Performed by: NURSE PRACTITIONER

## 2021-08-26 PROCEDURE — 99214 OFFICE O/P EST MOD 30 MIN: CPT | Performed by: NURSE PRACTITIONER

## 2021-08-26 PROCEDURE — 3017F COLORECTAL CA SCREEN DOC REV: CPT | Performed by: NURSE PRACTITIONER

## 2021-08-26 ASSESSMENT — ENCOUNTER SYMPTOMS
DIARRHEA: 0
APNEA: 0
CHEST TIGHTNESS: 0
COLOR CHANGE: 0
ABDOMINAL DISTENTION: 0
WHEEZING: 0
RECTAL PAIN: 0
VOICE CHANGE: 0
VOMITING: 0
STRIDOR: 0
EYE DISCHARGE: 0
ABDOMINAL PAIN: 0
CHOKING: 0
BLOOD IN STOOL: 0
SHORTNESS OF BREATH: 0
ANAL BLEEDING: 0
TROUBLE SWALLOWING: 0
SORE THROAT: 0
CONSTIPATION: 0
NAUSEA: 0
COUGH: 0
BACK PAIN: 1

## 2021-08-26 NOTE — PROGRESS NOTES
Subjective:      Patient Id:  Jacky Mcdowell is a 61 y.o. female who presents today with   Chief Complaint   Patient presents with    Follow-up          HPI       Seen at [facility] for symptom management follow up rt Lung Cancer, COPD, CAD, DM 2, HTN, HLD. Bipolar 1, schizophrenia, and history of alcohol abuse. Today demeanor calm and relaxed, NAD, no sedation. Being treated by Dr Stephanie Villasenor of [facility] for [facility] of left lung    resection of a soft tissue mass form the abdominal wall with pah showing poorly differentiated carcinoma with squamous features on 6/11/19. Pt seen in oncology >4 months later, after hospitalization at [facility] for CP. Initial CXR showed a L suprahilar mass and CT chest obtained on 11//19 showed an approximately 10cm L upper chest mass with associated encasement and narrowing of L hilar vessels and central airways c/w malignancy/bronchogenic carcinoma until proven otherwise. Small L pleural effusion. \" No evidence of metastatic disease on CT a/p except for trace nonspecific fluid in the pelvis. PET/CT as well as bronch with EBUS ordered Confirming diseaes only in the large L upper chest mass (all bx of mediastinal nodes negative for malignancy). Pt's case was discussed in tumor board meeting on 1/6/19 with consensus recommendation to proceed with aggressive tx with chemo/RT given now no evidence of metastatic disease and overall relatively indolent course. Pt completed concurrent chemo/RT with weekly carbo/taxol on 3/6/20. Maintenance tx with durvalumab started on 4/16/20. Recent ct scan shows stable disease, she continues with maintenance durvalumab       Complains of pain in left upper chest area, began after radiation, described as a burning, often radiates down her fingertips and down her thoracic back. Pain is worse in am and with movement. She avoids using the arm though stregth and  are equal with right. She has not tried PT, \" My doctor told me it would not help\". and immunocompromised state. Neurological: Negative for dizziness, tremors, seizures, syncope, facial asymmetry, speech difficulty, weakness, light-headedness, numbness and headaches. Hematological: Negative for adenopathy. Does not bruise/bleed easily. Psychiatric/Behavioral: Negative for agitation, behavioral problems, confusion, decreased concentration, dysphoric mood, hallucinations, self-injury, sleep disturbance and suicidal ideas. The patient is not nervous/anxious and is not hyperactive. Objective:   /64   Pulse 70   Resp 18   SpO2 98%     Physical Exam  Constitutional:       General: She is not in acute distress. Appearance: She is well-developed. She is not diaphoretic. HENT:      Head: Normocephalic and atraumatic. Right Ear: External ear normal.      Left Ear: External ear normal.      Nose: Nose normal.      Mouth/Throat:      Pharynx: No oropharyngeal exudate. Eyes:      General: No scleral icterus. Right eye: No discharge. Left eye: No discharge. Conjunctiva/sclera: Conjunctivae normal.      Pupils: Pupils are equal, round, and reactive to light. Neck:      Thyroid: No thyromegaly. Vascular: No JVD. Trachea: No tracheal deviation. Cardiovascular:      Rate and Rhythm: Normal rate and regular rhythm. Heart sounds: Normal heart sounds. Pulmonary:      Effort: Pulmonary effort is normal. No respiratory distress. Breath sounds: No stridor. Wheezing present. No rales. Chest:      Chest wall: No tenderness. Abdominal:      General: Bowel sounds are normal. There is no distension. Palpations: Abdomen is soft. There is no mass. Tenderness: There is no abdominal tenderness. There is no guarding or rebound. Musculoskeletal:         General: No tenderness or deformity. Normal range of motion. Cervical back: Normal range of motion and neck supple. Lymphadenopathy:      Cervical: No cervical adenopathy. Skin:     General: Skin is warm and dry. Capillary Refill: Capillary refill takes less than 2 seconds. Findings: No erythema or rash. Neurological:      Mental Status: She is alert and oriented to person, place, and time. Psychiatric:         Behavior: Behavior normal.         Thought Content: Thought content normal.           Assessment:       Diagnosis Orders   1. Palliative care patient     2. Neoplasm related pain     3. Constipation due to pain medication     4. Squamous cell carcinoma of lung, unspecified laterality ([facility] [address] [address]     5. [facility] ([facility]            Plan:    Pain rt neoplasm and neuropathy post radiation  - Continue  Norco 5/325 mg po every 8  Hours prn moderate to severe pain  - Heat or ice to painful areas, whichever is preferred  - Gentle ROM exercises  Pt is tolerating current pain meds without adverse effects or over sedation. Lowest effective dose used. Pt advised to call and notify palliative care for any adverse effects or sedation  Pt is able to maintain adequate functional level and participate in ADLs  OARRS reviewed. There is no indication of aberrant behavior  Pt advised to call for increasing or uncontrolled pain  Risk vs benefit assessed. Pt educated on risk of addiction. Pt advised to take only as prescribed and not to change frequency of pain meds without consulting palliative care first.    Bipolar 1 and Schizophrenia in remission  - Referral to psych as she is using high risk medications and she is undergoing a high stress medical treatment. She seems to be doing exceptionally well, but would like expert evaluation    Slow Transit Constipation due to opioid use  - Continue current POC  - Increase fluid and fiber in diet  - Exercise as tolerated  - Hold all for loose stool  - Call if no BM in three days    Wheezing/ Crackles  - Continue Duo neb treatments  - follow with pulmonology  - Call for increased SOB, cough, sputum production, fatigue.       Mary Quinonez Samantha Maurer, APRN - CNP

## 2021-09-20 DIAGNOSIS — Z51.5 PALLIATIVE CARE ENCOUNTER: ICD-10-CM

## 2021-09-20 DIAGNOSIS — C34.90 MALIGNANT NEOPLASM OF LUNG, UNSPECIFIED LATERALITY, UNSPECIFIED PART OF LUNG (HCC): ICD-10-CM

## 2021-09-20 DIAGNOSIS — G89.3 NEOPLASM RELATED PAIN: ICD-10-CM

## 2021-09-20 RX ORDER — HYDROCODONE BITARTRATE AND ACETAMINOPHEN 5; 325 MG/1; MG/1
1 TABLET ORAL EVERY 8 HOURS PRN
Qty: 90 TABLET | Refills: 0 | Status: SHIPPED | OUTPATIENT
Start: 2021-09-20 | End: 2021-10-14 | Stop reason: SDUPTHER

## 2021-10-14 ENCOUNTER — OFFICE VISIT (OUTPATIENT)
Dept: PALLATIVE CARE | Age: 56
End: 2021-10-14
Payer: COMMERCIAL

## 2021-10-14 VITALS
SYSTOLIC BLOOD PRESSURE: 132 MMHG | HEART RATE: 70 BPM | RESPIRATION RATE: 18 BRPM | TEMPERATURE: 98.1 F | DIASTOLIC BLOOD PRESSURE: 69 MMHG | OXYGEN SATURATION: 97 %

## 2021-10-14 DIAGNOSIS — G89.3 NEOPLASM RELATED PAIN: ICD-10-CM

## 2021-10-14 DIAGNOSIS — C34.90 SQUAMOUS CELL CARCINOMA OF LUNG, UNSPECIFIED LATERALITY (HCC): Primary | ICD-10-CM

## 2021-10-14 DIAGNOSIS — Z51.5 PALLIATIVE CARE ENCOUNTER: ICD-10-CM

## 2021-10-14 DIAGNOSIS — K59.03 CONSTIPATION DUE TO PAIN MEDICATION: ICD-10-CM

## 2021-10-14 DIAGNOSIS — R06.89 BREATH SOUNDS, ABNORMAL: ICD-10-CM

## 2021-10-14 DIAGNOSIS — F31.9 BIPOLAR I DISORDER (HCC): ICD-10-CM

## 2021-10-14 PROCEDURE — 1036F TOBACCO NON-USER: CPT | Performed by: NURSE PRACTITIONER

## 2021-10-14 PROCEDURE — G8421 BMI NOT CALCULATED: HCPCS | Performed by: NURSE PRACTITIONER

## 2021-10-14 PROCEDURE — 99214 OFFICE O/P EST MOD 30 MIN: CPT | Performed by: NURSE PRACTITIONER

## 2021-10-14 PROCEDURE — G8484 FLU IMMUNIZE NO ADMIN: HCPCS | Performed by: NURSE PRACTITIONER

## 2021-10-14 PROCEDURE — 3017F COLORECTAL CA SCREEN DOC REV: CPT | Performed by: NURSE PRACTITIONER

## 2021-10-14 PROCEDURE — G8427 DOCREV CUR MEDS BY ELIG CLIN: HCPCS | Performed by: NURSE PRACTITIONER

## 2021-10-14 RX ORDER — HYDROCODONE BITARTRATE AND ACETAMINOPHEN 5; 325 MG/1; MG/1
1 TABLET ORAL EVERY 8 HOURS PRN
Qty: 90 TABLET | Refills: 0 | Status: SHIPPED | OUTPATIENT
Start: 2021-10-14 | End: 2021-11-19 | Stop reason: SDUPTHER

## 2021-10-14 ASSESSMENT — ENCOUNTER SYMPTOMS
VOICE CHANGE: 0
TROUBLE SWALLOWING: 0
NAUSEA: 0
SHORTNESS OF BREATH: 0
COUGH: 0
BLOOD IN STOOL: 0
CHEST TIGHTNESS: 0
RECTAL PAIN: 0
WHEEZING: 0
BACK PAIN: 1
APNEA: 0
SORE THROAT: 0
COLOR CHANGE: 0
CHOKING: 0
STRIDOR: 0
CONSTIPATION: 0
VOMITING: 0
EYE DISCHARGE: 0
DIARRHEA: 0
ANAL BLEEDING: 0
ABDOMINAL PAIN: 0
ABDOMINAL DISTENTION: 0

## 2021-10-14 NOTE — PROGRESS NOTES
Subjective:      Patient Id:  Ana Lilia Burns is a 61 y.o. female who presents today with   Chief Complaint   Patient presents with    Follow-up          HPI       Seen at [facility] for symptom management follow up rt Lung Cancer, COPD, CAD, DM 2, HTN, HLD. Bipolar 1, schizophrenia, and history of alcohol abuse. Today demeanor calm and relaxed, NAD, no sedation. Being treated by Dr Jaya Veloz of [facility] for [employer] of left lung    resection of a soft tissue mass form the abdominal wall with pah showing poorly differentiated carcinoma with squamous features on 6/11/19. Pt seen in oncology >4 months later, after hospitalization at [facility] for CP. Initial CXR showed a L suprahilar mass and CT chest obtained on 11//19 showed an approximately 10cm L upper chest mass with associated encasement and narrowing of L hilar vessels and central airways c/w malignancy/bronchogenic carcinoma until proven otherwise. Small L pleural effusion. \" No evidence of metastatic disease on CT a/p except for trace nonspecific fluid in the pelvis. PET/CT as well as bronch with EBUS ordered Confirming diseaes only in the large L upper chest mass (all bx of mediastinal nodes negative for malignancy). Pt's case was discussed in tumor board meeting on 1/6/19 with consensus recommendation to proceed with aggressive tx with chemo/RT given now no evidence of metastatic disease and overall relatively indolent course. Pt completed concurrent chemo/RT with weekly carbo/taxol on 3/6/20. Maintenance tx with durvalumab started on 4/16/20. Recent ct scan shows stable disease, she continues with maintenance durvalumab       Complains of pain in left upper chest area, began after radiation, described as a burning, often radiates down her fingertips and down her thoracic back. Pain is worse in am and with movement. She avoids using the arm though stregth and  are equal with right. She has not tried PT, \" My doctor told me it would not help\". Crackles are auscultated under area of pain. Pain is managed well with Norco 5/325 mg po 1 tab po every 8 hours prn mod to severe pain. She tried gabapentin in the past without improvement. She tried Lyrica and after two weeks found herself starting to have mood swings, anger outbursts, and dizzy spells. She stopped it after speaking with her oncologist. She had a similar reaction to gabapentinin the past       She tells me she is not working with any mental health providers, nor on any  Medications. She stopped drinking three years ago and her symptoms improved dramatically. Denies auditory or visual hallucinations. No homicidal or suicidal ideations, no amilcar or grandiose behavior, no sleep difficulty. No significant anxiety, agitation, or depression. She was unable to see dr Kevon Samayoa as they are unable to accept new clients. HAs an appointment with another psychologist but it is a few months out. Negative headaches, dizziness, or vision changes. No dysphagia, dysgeusia, or mouth sores; weight stable, Appetite is good. No GI or  concerns. No renato blood in stool or urine. SOB and edema at baseline. She only uses her nebulizer prn. Negative excessive fatigue, fever, chills, myalgias, increased sputum production or cough, nausea, vomiting, chest pain, or orthopnea.     Past Medical History:   Diagnosis Date    Abnormal Pap smear of cervix     Asthma     Bacterial vaginosis     Bipolar 1 disorder ([address]     CAD (coronary artery disease)     COPD (chronic obstructive pulmonary disease) (HCC)     chronic bronchitis    Diabetes ([address]     Ectopic pregnancy     Hypertension     Lung cancer ([address]     Obesity     Schizophrenia ([address]     Type 2 diabetes mellitus without complication ([address]      Past Surgical History:   Procedure Laterality Date    ABDOMEN SURGERY       SECTION      CHOLECYSTECTOMY      DILATION AND CURETTAGE OF UTERUS      TUBAL LIGATION      UMBILICAL HERNIA REPAIR Social History     Socioeconomic History    Marital status:      Spouse name: Not on file    Number of children: Not on file    Years of education: Not on file    Highest education level: Not on file   Occupational History    Not on file   Tobacco Use    Smoking status: Former Smoker     Packs/day: 1.50     Years: 38.00     Pack years: 57.00     Types: Cigarettes     Start date: 45     Quit date: 2019     Years since quittin.9    Smokeless tobacco: Never Used   Vaping Use    Vaping Use: Never used   Substance and Sexual Activity    Alcohol use: Not Currently    Drug use: Not Currently    Sexual activity: Yes   Other Topics Concern    Not on file   Social History Narrative    Not on file     Social Determinants of Health     Financial Resource Strain:     Difficulty of Paying Living Expenses:    Food Insecurity:     Worried About Running Out of Food in the Last Year:     [address] in the Last Year:    Transportation Needs:     Lack of Transportation (Medical):      Lack of Transportation (Non-Medical):    Physical Activity:     Days of Exercise per Week:     Minutes of Exercise per Session:    Stress:     Feeling of Stress :    Social Connections:     Frequency of Communication with Friends and Family:     Frequency of Social Gatherings with Friends and Family:     Attends Anabaptism Services:     Active Member of Clubs or Organizations:     Attends Club or Organization Meetings:     Marital Status:    Intimate Partner Violence:     Fear of Current or Ex-Partner:     Emotionally Abused:     Physically Abused:     Sexually Abused:      Family History   Problem Relation Age of Onset    Diabetes Mother     High Blood Pressure Mother     Coronary Art Dis Father     High Blood Pressure Father     Diabetes Father      Allergies   Allergen Reactions    Morphine Anaphylaxis    Demerol Hcl [Meperidine] Itching    Lyrica [Pregabalin] Other (See Comments) Dizzy, hallucinations depression, mood swings    Gabapentin Other (See Comments)     Agitation, anxiety, mood swings     Current Outpatient Medications on File Prior to Visit   Medication Sig Dispense Refill    polyethylene glycol (MIRALAX) 17 g PACK packet Take 17 g by mouth daily      ipratropium-albuterol (DUONEB) 0.5-2.5 (3) MG/3ML SOLN nebulizer solution Inhale 1 vial into the lungs every 4 hours      potassium chloride (KLOR-CON M) 20 MEQ extended release tablet Take 20 mEq by mouth      amLODIPine (NORVASC) 10 MG tablet Take 10 mg by mouth daily      glipiZIDE (GLUCOTROL) 5 MG tablet Take 5 mg by mouth daily      losartan (COZAAR) 50 MG tablet Take 50 mg by mouth daily       No current facility-administered medications on file prior to visit. Review of Systems   Constitutional: Negative for activity change, appetite change, chills, diaphoresis, fatigue, fever and unexpected weight change. HENT: Negative for drooling, hearing loss, mouth sores, sore throat, trouble swallowing and voice change. Eyes: Negative for discharge and visual disturbance. Respiratory: Negative for apnea, cough, choking, chest tightness, shortness of breath, wheezing and stridor. Cardiovascular: Negative for chest pain, palpitations and leg swelling. Gastrointestinal: Negative for abdominal distention, abdominal pain, anal bleeding, blood in stool, constipation, diarrhea, nausea, rectal pain and vomiting. Genitourinary: Negative for difficulty urinating, dysuria, enuresis, frequency and hematuria. Musculoskeletal: Positive for back pain. Negative for arthralgias, gait problem, joint swelling and myalgias. Skin: Negative for color change, pallor, rash and wound. Allergic/Immunologic: Negative for food allergies and immunocompromised state. Neurological: Negative for dizziness, tremors, seizures, syncope, facial asymmetry, speech difficulty, weakness, light-headedness, numbness and headaches. Hematological: Negative for adenopathy. Does not bruise/bleed easily. Psychiatric/Behavioral: Negative for agitation, behavioral problems, confusion, decreased concentration, dysphoric mood, hallucinations, self-injury, sleep disturbance and suicidal ideas. The patient is not nervous/anxious and is not hyperactive. Objective:   /69   Pulse 70   Temp 98.1 °F (36.7 °C)   Resp 18   SpO2 97%     Physical Exam  Constitutional:       General: She is not in acute distress. Appearance: She is well-developed. She is not diaphoretic. HENT:      Head: Normocephalic and atraumatic. Right Ear: External ear normal.      Left Ear: External ear normal.      Nose: Nose normal.      Mouth/Throat:      Pharynx: No oropharyngeal exudate. Eyes:      General: No scleral icterus. Right eye: No discharge. Left eye: No discharge. Conjunctiva/sclera: Conjunctivae normal.      Pupils: Pupils are equal, round, and reactive to light. Neck:      Thyroid: No thyromegaly. Vascular: No JVD. Trachea: No tracheal deviation. Cardiovascular:      Rate and Rhythm: Normal rate and regular rhythm. Heart sounds: Normal heart sounds. Pulmonary:      Effort: Pulmonary effort is normal. No respiratory distress. Breath sounds: No stridor. Decreased breath sounds present. No wheezing or rales. Chest:      Chest wall: No tenderness. Abdominal:      General: Bowel sounds are normal. There is no distension. Palpations: Abdomen is soft. There is no mass. Tenderness: There is no abdominal tenderness. There is no guarding or rebound. Musculoskeletal:         General: No tenderness or deformity. Normal range of motion. Cervical back: Normal range of motion and neck supple. Lymphadenopathy:      Cervical: No cervical adenopathy. Skin:     General: Skin is warm and dry. Capillary Refill: Capillary refill takes less than 2 seconds.       Findings: No erythema or rash.   Neurological:      Mental Status: She is alert and oriented to person, place, and time. Psychiatric:         Behavior: Behavior normal.         Thought Content: Thought content normal.           Assessment:       Diagnosis Orders   1. Squamous cell carcinoma of lung, unspecified laterality ([facility]     2. Neoplasm related pain  HYDROcodone-acetaminophen (NORCO) 5-325 MG per tablet   3. Palliative care encounter  HYDROcodone-acetaminophen (NORCO) 5-325 MG per tablet   4. Bipolar I disorder ([facility]     5. Constipation due to pain medication     6. Breath sounds, abnormal            Plan:    Pain rt neoplasm and neuropathy post radiation  - Acetaminophen 650 mg po every 8 hours prn mild pain  - Continue  Norco 5/325 mg po every 8  Hours prn moderate to severe pain  - Heat or ice to painful areas, whichever is preferred  - Gentle ROM exercises  Pt is tolerating current pain meds without adverse effects or over sedation. Lowest effective dose used. Pt advised to call and notify palliative care for any adverse effects or sedation  Pt is able to maintain adequate functional level and participate in ADLs  OARRS reviewed. There is no indication of aberrant behavior  Pt advised to call for increasing or uncontrolled pain  Risk vs benefit assessed. Pt educated on risk of addiction. Pt advised to take only as prescribed and not to change frequency of pain meds without consulting palliative care first.    Bipolar 1 and Schizophrenia in remission  - follow with psych as she is using high risk medications and she is undergoing a high stress medical treatment. Slow Transit Constipation due to opioid use  - Continue current POC  - Increase fluid and fiber in diet  - Exercise as tolerated  - Hold all for loose stool  - Call if no BM in three days    Diminished Breath Sounds  - Continue Duo neb treatments  - follow with pulmonology  - Call for increased SOB, cough, sputum production, fatigue.       Heidi Barone, APRN - CNP

## 2021-11-19 DIAGNOSIS — G89.3 NEOPLASM RELATED PAIN: ICD-10-CM

## 2021-11-19 DIAGNOSIS — Z51.5 PALLIATIVE CARE ENCOUNTER: ICD-10-CM

## 2021-11-19 RX ORDER — HYDROCODONE BITARTRATE AND ACETAMINOPHEN 5; 325 MG/1; MG/1
1 TABLET ORAL EVERY 8 HOURS PRN
Qty: 90 TABLET | Refills: 0 | Status: SHIPPED | OUTPATIENT
Start: 2021-11-19 | End: 2021-12-20 | Stop reason: SDUPTHER

## 2021-11-19 NOTE — TELEPHONE ENCOUNTER
Rx requested:  Requested Prescriptions     Pending Prescriptions Disp Refills    HYDROcodone-acetaminophen (NORCO) 5-325 MG per tablet 90 tablet 0     Sig: Take 1 tablet by mouth every 8 hours as needed for Pain for up to 30 days.        Last Office Visit:   10/14/2021      Last filled:  10/14/2021    Next Visit Date:  Future Appointments   Date Time Provider Karolina Marin   1/6/2022  1:00 PM REINA Mcintosh - CNP [facility]

## 2021-12-20 DIAGNOSIS — G89.3 NEOPLASM RELATED PAIN: ICD-10-CM

## 2021-12-20 DIAGNOSIS — Z51.5 PALLIATIVE CARE ENCOUNTER: ICD-10-CM

## 2021-12-20 RX ORDER — HYDROCODONE BITARTRATE AND ACETAMINOPHEN 5; 325 MG/1; MG/1
1 TABLET ORAL EVERY 8 HOURS PRN
Qty: 90 TABLET | Refills: 0 | Status: SHIPPED | OUTPATIENT
Start: 2021-12-20 | End: 2022-01-20 | Stop reason: SDUPTHER

## 2021-12-20 NOTE — TELEPHONE ENCOUNTER
Rx requested:  Requested Prescriptions     Pending Prescriptions Disp Refills    HYDROcodone-acetaminophen (NORCO) 5-325 MG per tablet 90 tablet 0     Sig: Take 1 tablet by mouth every 8 hours as needed for Pain for up to 30 days.        Last Office Visit:   10/14/2021      Last filled:  11/19/2021    Next Visit Date:  Future Appointments   Date Time Provider Karolina Marin   1/6/2022  1:00 PM Tal Alcocer APRN - CNP [address] [facility]

## 2022-01-06 ENCOUNTER — HOSPITAL ENCOUNTER (OUTPATIENT)
Dept: GENERAL RADIOLOGY | Age: 57
Discharge: HOME | End: 2022-01-08
Payer: COMMERCIAL

## 2022-01-06 ENCOUNTER — OFFICE VISIT (OUTPATIENT)
Dept: PALLATIVE CARE | Age: 57
End: 2022-01-06
Payer: COMMERCIAL

## 2022-01-06 DIAGNOSIS — M54.41 CHRONIC MIDLINE LOW BACK PAIN WITH BILATERAL SCIATICA: ICD-10-CM

## 2022-01-06 DIAGNOSIS — Z51.5 PALLIATIVE CARE ENCOUNTER: ICD-10-CM

## 2022-01-06 DIAGNOSIS — G89.29 CHRONIC MIDLINE LOW BACK PAIN WITH BILATERAL SCIATICA: ICD-10-CM

## 2022-01-06 DIAGNOSIS — F31.9 BIPOLAR 1 DISORDER (HCC): ICD-10-CM

## 2022-01-06 DIAGNOSIS — J45.909 UNCOMPLICATED ASTHMA, UNSPECIFIED ASTHMA SEVERITY, UNSPECIFIED WHETHER PERSISTENT: Primary | ICD-10-CM

## 2022-01-06 DIAGNOSIS — R06.02 SOB (SHORTNESS OF BREATH): ICD-10-CM

## 2022-01-06 DIAGNOSIS — K59.03 CONSTIPATION DUE TO PAIN MEDICATION: ICD-10-CM

## 2022-01-06 DIAGNOSIS — M54.42 CHRONIC MIDLINE LOW BACK PAIN WITH BILATERAL SCIATICA: ICD-10-CM

## 2022-01-06 DIAGNOSIS — C34.11 MALIGNANT NEOPLASM OF UPPER LOBE OF RIGHT LUNG (HCC): ICD-10-CM

## 2022-01-06 DIAGNOSIS — M54.6 ACUTE MIDLINE THORACIC BACK PAIN: ICD-10-CM

## 2022-01-06 DIAGNOSIS — R06.2 WHEEZE: ICD-10-CM

## 2022-01-06 PROCEDURE — 72110 X-RAY EXAM L-2 SPINE 4/>VWS: CPT

## 2022-01-06 PROCEDURE — 3017F COLORECTAL CA SCREEN DOC REV: CPT | Performed by: NURSE PRACTITIONER

## 2022-01-06 PROCEDURE — G8421 BMI NOT CALCULATED: HCPCS | Performed by: NURSE PRACTITIONER

## 2022-01-06 PROCEDURE — 1036F TOBACCO NON-USER: CPT | Performed by: NURSE PRACTITIONER

## 2022-01-06 PROCEDURE — G8484 FLU IMMUNIZE NO ADMIN: HCPCS | Performed by: NURSE PRACTITIONER

## 2022-01-06 PROCEDURE — 72072 X-RAY EXAM THORAC SPINE 3VWS: CPT

## 2022-01-06 PROCEDURE — 99214 OFFICE O/P EST MOD 30 MIN: CPT | Performed by: NURSE PRACTITIONER

## 2022-01-06 PROCEDURE — G8427 DOCREV CUR MEDS BY ELIG CLIN: HCPCS | Performed by: NURSE PRACTITIONER

## 2022-01-06 RX ORDER — ALBUTEROL SULFATE 90 UG/1
2 AEROSOL, METERED RESPIRATORY (INHALATION) EVERY 6 HOURS PRN
Qty: 18 G | Refills: 5 | Status: SHIPPED | OUTPATIENT
Start: 2022-01-06 | End: 2022-02-05

## 2022-01-06 ASSESSMENT — ENCOUNTER SYMPTOMS
CHOKING: 0
COUGH: 0
SORE THROAT: 0
CHEST TIGHTNESS: 0
EYE DISCHARGE: 0
NAUSEA: 0
BACK PAIN: 1
BLOOD IN STOOL: 0
ANAL BLEEDING: 0
APNEA: 0
DIARRHEA: 0
ABDOMINAL PAIN: 0
RECTAL PAIN: 0
TROUBLE SWALLOWING: 0
COLOR CHANGE: 0
CONSTIPATION: 0
VOICE CHANGE: 0
STRIDOR: 0
VOMITING: 0
ABDOMINAL DISTENTION: 0
WHEEZING: 0
SHORTNESS OF BREATH: 0

## 2022-01-06 NOTE — PROGRESS NOTES
Subjective:      Patient Id:  Tatiana Hunt is a 61 y.o. female who presents today with   No chief complaint on file. HPI     Seen at [facility] clinic for symptom management follow up rt Lung Cancer, COPD, CAD, DM 2, HTN, HLD. Bipolar 1, schizophrenia, and history of alcohol abuse. Today demeanor calm and relaxed, NAD, no sedation. Being treated by Dr Holly Jay of [facility] for [facility] of left lung    resection of a soft tissue mass form the abdominal wall with pah showing poorly differentiated carcinoma with squamous features on 6/11/19. Pt seen in oncology >4 months later, after hospitalization at [facility] for CP. Initial CXR showed a L suprahilar mass and CT chest obtained on 11//19 showed an approximately 10cm L upper chest mass with associated encasement and narrowing of L hilar vessels and central airways c/w malignancy/bronchogenic carcinoma until proven otherwise. Small L pleural effusion. \" No evidence of metastatic disease on CT a/p except for trace nonspecific fluid in the pelvis. PET/CT as well as bronch with EBUS ordered Confirming disease only in the large L upper chest mass (all bx of mediastinal nodes negative for malignancy). Pt's case was discussed in tumor board meeting on 1/6/19 with consensus recommendation to proceed with aggressive tx with chemo/RT given now no evidence of metastatic disease and overall relatively indolent course. Pt completed concurrent chemo/RT with weekly carbo/taxol on 3/6/20. Maintenance tx with durvalumab started on 4/16/20. ct scan 9/21/21 shows stable disease, with left superior mediastinal mass and  Partially loculated small to moderate left pleural effusion, Groundglass opacities and subcentimeter nodular opacities in right   lung, stable      she continues with maintenance durvalumab        Today she complains of pain in her thoracic to low back in spinal area with radiation down bilateral legs. No known injuries.  Increasing in severity started about a month ago. Her routine Norco helps some, movement makes it worse. No LOC of bowel bladder, or feet. No heaviness or weakness     Negative headaches, dizziness, or vision changes. No dysphagia, dysgeusia, or mouth sores; weight stable, Appetite is good. No GI or  concerns. No renato blood in stool or urine. SOB and edema at baseline. Lungs sound wheezy, she is out of albuterol HFA. She only uses her nebulizer prn. Negative excessive fatigue, fever, chills, myalgias, increased sputum production or cough, nausea, vomiting, chest pain, or orthopnea. No significant anxiety, agitation, depression, or sleep disturbance. Excited about two new grandchildren about to be born. Past Medical History:   Diagnosis Date    Abnormal Pap smear of cervix     Asthma     Bacterial vaginosis     [facility] disorder ([facility]     CAD (coronary artery disease)     COPD (chronic obstructive pulmonary disease) (HCC)     chronic bronchitis    Diabetes ([facility]     Ectopic pregnancy     Hypertension     Lung cancer ([facility]     Obesity     Schizophrenia ([facility]     Type 2 diabetes mellitus without complication ([facility]      Past Surgical History:   Procedure Laterality Date    ABDOMEN SURGERY       SECTION      CHOLECYSTECTOMY      DILATION AND CURETTAGE OF UTERUS      TUBAL LIGATION      UMBILICAL HERNIA REPAIR       Social History     Socioeconomic History    Marital status:       Spouse name: Not on file    Number of children: Not on file    Years of education: Not on file    Highest education level: Not on file   Occupational History    Not on file   Tobacco Use    Smoking status: Former Smoker     Packs/day: 1.50     Years: 38.00     Pack years: 57.00     Types: Cigarettes     Start date: 45     Quit date: 2019     Years since quittin.1    Smokeless tobacco: Never Used   Vaping Use    Vaping Use: Never used   Substance and Sexual Activity    Alcohol use: Not Currently    Drug use: Not Currently    Sexual activity: Yes   Other Topics Concern    Not on file   Social History Narrative    Not on file     Social Determinants of Health     Financial Resource Strain:     Difficulty of Paying Living Expenses: Not on file   Food Insecurity:     Worried About Running Out of Food in the Last Year: Not on file    Abrahan of Food in the Last Year: Not on file   Transportation Needs:     Lack of Transportation (Medical): Not on file    Lack of Transportation (Non-Medical):  Not on file   Physical Activity:     Days of Exercise per Week: Not on file    Minutes of Exercise per Session: Not on file   Stress:     Feeling of Stress : Not on file   Social Connections:     Frequency of Communication with Friends and Family: Not on file    Frequency of Social Gatherings with Friends and Family: Not on file    Attends Yarsanism Services: Not on file    Active Member of [address] [place] [address] [employer] or Organizations: Not on file    Attends Club or Organization Meetings: Not on file    Marital Status: Not on file   Intimate Partner Violence:     Fear of Current or Ex-Partner: Not on file    Emotionally Abused: Not on file    Physically Abused: Not on file    Sexually Abused: Not on file   Housing Stability:     Unable to Pay for Housing in the Last Year: Not on file    Number of [place] in the Last Year: Not on file    Unstable Housing in the Last Year: Not on file     Family History   Problem Relation Age of Onset    Diabetes Mother     High Blood Pressure Mother     Coronary Art Dis Father     High Blood Pressure Father     Diabetes Father      Allergies   Allergen Reactions    Morphine Anaphylaxis    Demerol Hcl [Meperidine] Itching    Lyrica [Pregabalin] Other (See Comments)     Dizzy, hallucinations depression, mood swings    Gabapentin Other (See Comments)     Agitation, anxiety, mood swings     Current Outpatient Medications on File Prior to Visit   Medication Sig Dispense Refill    HYDROcodone-acetaminophen (NORCO) 5-325 MG per tablet Take 1 tablet by mouth every 8 hours as needed for Pain for up to 30 days. 90 tablet 0    polyethylene glycol (MIRALAX) 17 g PACK packet Take 17 g by mouth daily      ipratropium-albuterol (DUONEB) 0.5-2.5 (3) MG/3ML SOLN nebulizer solution Inhale 1 vial into the lungs every 4 hours      potassium chloride (KLOR-CON M) 20 MEQ extended release tablet Take 20 mEq by mouth      amLODIPine (NORVASC) 10 MG tablet Take 10 mg by mouth daily      glipiZIDE (GLUCOTROL) 5 MG tablet Take 5 mg by mouth daily      losartan (COZAAR) 50 MG tablet Take 50 mg by mouth daily       No current facility-administered medications on file prior to visit. Review of Systems   Constitutional: Negative for activity change, appetite change, chills, diaphoresis, fatigue, fever and unexpected weight change. HENT: Negative for drooling, hearing loss, mouth sores, sore throat, trouble swallowing and voice change. Eyes: Negative for discharge and visual disturbance. Respiratory: Negative for apnea, cough, choking, chest tightness, shortness of breath, wheezing and stridor. Cardiovascular: Negative for chest pain, palpitations and leg swelling. Gastrointestinal: Negative for abdominal distention, abdominal pain, anal bleeding, blood in stool, constipation, diarrhea, nausea, rectal pain and vomiting. Genitourinary: Negative for difficulty urinating, dysuria, enuresis, frequency and hematuria. Musculoskeletal: Positive for back pain. Negative for arthralgias, gait problem, joint swelling and myalgias. Skin: Negative for color change, pallor, rash and wound. Allergic/Immunologic: Negative for food allergies and immunocompromised state. Neurological: Negative for dizziness, tremors, seizures, syncope, facial asymmetry, speech difficulty, weakness, light-headedness, numbness and headaches. Hematological: Negative for adenopathy. Does not bruise/bleed easily. Psychiatric/Behavioral: Negative for agitation, behavioral problems, confusion, decreased concentration, dysphoric mood, hallucinations, self-injury, sleep disturbance and suicidal ideas. The patient is not nervous/anxious and is not hyperactive. Objective: There were no vitals taken for this visit. Physical Exam  Constitutional:       General: She is not in acute distress. Appearance: She is well-developed. She is not diaphoretic. HENT:      Head: Normocephalic and atraumatic. Right Ear: External ear normal.      Left Ear: External ear normal.      Nose: Nose normal.      Mouth/Throat:      Pharynx: No oropharyngeal exudate. Eyes:      General: No scleral icterus. Right eye: No discharge. Left eye: No discharge. Conjunctiva/sclera: Conjunctivae normal.      Pupils: Pupils are equal, round, and reactive to light. Neck:      Thyroid: No thyromegaly. Vascular: No JVD. Trachea: No tracheal deviation. Cardiovascular:      Rate and Rhythm: Normal rate and regular rhythm. Heart sounds: Normal heart sounds. Pulmonary:      Effort: Pulmonary effort is normal. No respiratory distress. Breath sounds: No stridor. Decreased breath sounds present. No wheezing or rales. Chest:      Chest wall: No tenderness. Abdominal:      General: Bowel sounds are normal. There is no distension. Palpations: Abdomen is soft. There is no mass. Tenderness: There is no abdominal tenderness. There is no guarding or rebound. Musculoskeletal:         General: No tenderness or deformity. Normal range of motion. Cervical back: Normal range of motion and neck supple. Lymphadenopathy:      Cervical: No cervical adenopathy. Skin:     General: Skin is warm and dry. Capillary Refill: Capillary refill takes less than 2 seconds. Findings: No erythema or rash. Neurological:      Mental Status: She is alert and oriented to person, place, and time. Psychiatric:         Behavior: Behavior normal.         Thought Content: Thought content normal.           Assessment:       Diagnosis Orders   1. Uncomplicated asthma, unspecified asthma severity, unspecified whether persistent  albuterol sulfate  (90 Base) MCG/ACT inhaler   2. Malignant neoplasm of upper lobe of right lung (HCC)  albuterol sulfate  (90 Base) MCG/ACT inhaler    XR LUMBAR SPINE (2-3 VIEWS)    XR THORACIC SPINE (3 VIEWS)   3. Palliative care encounter  albuterol sulfate  (90 Base) MCG/ACT inhaler    XR LUMBAR SPINE (2-3 VIEWS)   4. SOB (shortness of breath)  albuterol sulfate  (90 Base) MCG/ACT inhaler   5. Wheeze  albuterol sulfate  (90 Base) MCG/ACT inhaler   6. Chronic midline low back pain with bilateral sciatica  XR LUMBAR SPINE (2-3 VIEWS)   7. Acute midline thoracic back pain  XR THORACIC SPINE (3 VIEWS)   8. Constipation due to pain medication     9. Bipolar 1 disorder ([facility]            Plan:    Pain rt neoplasm and neuropathy post radiation  - Acetaminophen 650 mg po every 8 hours prn mild pain  - Continue  Norco 5/325 mg po every 8  Hours prn moderate to severe pain  - Heat or ice to painful areas, whichever is preferred  - Gentle ROM exercises  Pt is tolerating current pain meds without adverse effects or over sedation. Lowest effective dose used. Pt advised to call and notify palliative care for any adverse effects or sedation  Pt is able to maintain adequate functional level and participate in ADLs  OARRS reviewed. There is no indication of aberrant behavior  Pt advised to call for increasing or uncontrolled pain  Risk vs benefit assessed. Pt educated on risk of addiction.    Pt advised to take only as prescribed and not to change frequency of pain meds without consulting palliative care first.    Thoracic and lumbar back pain  - Discussed possible etiology, either orthopedic or neoplastic related  - Reviewed alarm signs of cauda equina  - Ordered lumbar and thoracic x ray    Bipolar 1 and Schizophrenia in remission  - follow with psych as she is using high risk medications and she is undergoing a high stress medical treatment. Slow Transit Constipation due to opioid use  - Continue current POC  - Increase fluid and fiber in diet  - Exercise as tolerated  - Hold all for loose stool  - Call if no BM in three days    [employer]  Breath Sounds  - Ordered Albuterol HFA  - Encouraged use of Duo neb treatments  - follow with pulmonology  - Call for increased SOB, cough, sputum production, fatigue.       Magalis Ruiz APRN - CNP

## 2022-01-07 ENCOUNTER — TELEPHONE (OUTPATIENT)
Dept: PALLATIVE CARE | Age: 57
End: 2022-01-07

## 2022-01-07 RX ORDER — MELOXICAM 7.5 MG/1
7.5 TABLET ORAL DAILY
Qty: 30 TABLET | Refills: 3 | Status: SHIPPED | OUTPATIENT
Start: 2022-01-07 | End: 2022-04-28 | Stop reason: SDUPTHER

## 2022-01-07 NOTE — TELEPHONE ENCOUNTER
Discussed results of X ray with Pat, mild arthritic changes seen. Recommend she take Mobic 7.5 mg po daily with food. Offered PT, declined due to covid Encouraged exercise and weight loss. If no improvement and symptoms worsen I recommend she speak with oncology for bone scan.

## 2022-01-20 DIAGNOSIS — Z51.5 PALLIATIVE CARE ENCOUNTER: ICD-10-CM

## 2022-01-20 DIAGNOSIS — G89.3 NEOPLASM RELATED PAIN: ICD-10-CM

## 2022-01-20 RX ORDER — HYDROCODONE BITARTRATE AND ACETAMINOPHEN 5; 325 MG/1; MG/1
1 TABLET ORAL EVERY 8 HOURS PRN
Qty: 90 TABLET | Refills: 0 | Status: SHIPPED | OUTPATIENT
Start: 2022-01-20 | End: 2022-02-21 | Stop reason: SDUPTHER

## 2022-01-20 NOTE — TELEPHONE ENCOUNTER
Rx requested:  Requested Prescriptions     Pending Prescriptions Disp Refills    HYDROcodone-acetaminophen (NORCO) 5-325 MG per tablet 90 tablet 0     Sig: Take 1 tablet by mouth every 8 hours as needed for Pain for up to 30 days.        Last Office Visit:   1/6/2022      Last filled:  12/20/2021    Next Visit Date:  Future Appointments   Date Time Provider Karolina Marin   3/3/2022  1:00 PM Kenna Martin, APRN - CNP [facility]

## 2022-02-21 DIAGNOSIS — G89.3 NEOPLASM RELATED PAIN: ICD-10-CM

## 2022-02-21 DIAGNOSIS — Z51.5 PALLIATIVE CARE ENCOUNTER: ICD-10-CM

## 2022-02-21 RX ORDER — HYDROCODONE BITARTRATE AND ACETAMINOPHEN 5; 325 MG/1; MG/1
1 TABLET ORAL EVERY 8 HOURS PRN
Qty: 90 TABLET | Refills: 0 | Status: SHIPPED | OUTPATIENT
Start: 2022-02-21 | End: 2022-03-21 | Stop reason: SDUPTHER

## 2022-03-21 DIAGNOSIS — Z51.5 PALLIATIVE CARE ENCOUNTER: ICD-10-CM

## 2022-03-21 DIAGNOSIS — G89.3 NEOPLASM RELATED PAIN: ICD-10-CM

## 2022-03-21 RX ORDER — HYDROCODONE BITARTRATE AND ACETAMINOPHEN 5; 325 MG/1; MG/1
1 TABLET ORAL EVERY 8 HOURS PRN
Qty: 90 TABLET | Refills: 0 | Status: SHIPPED | OUTPATIENT
Start: 2022-03-21 | End: 2022-04-22 | Stop reason: SDUPTHER

## 2022-03-21 NOTE — TELEPHONE ENCOUNTER
Rx requested:  Requested Prescriptions     Pending Prescriptions Disp Refills    HYDROcodone-acetaminophen (NORCO) 5-325 MG per tablet 90 tablet 0     Sig: Take 1 tablet by mouth every 8 hours as needed for Pain for up to 30 days. Last Office Visit:   1/6/2022      Last filled:  2/21/22    Next Visit Date:  No future appointments.

## 2022-04-22 DIAGNOSIS — G89.3 NEOPLASM RELATED PAIN: ICD-10-CM

## 2022-04-22 DIAGNOSIS — Z51.5 PALLIATIVE CARE ENCOUNTER: ICD-10-CM

## 2022-04-22 RX ORDER — HYDROCODONE BITARTRATE AND ACETAMINOPHEN 5; 325 MG/1; MG/1
1 TABLET ORAL EVERY 8 HOURS PRN
Qty: 90 TABLET | Refills: 0 | Status: SHIPPED | OUTPATIENT
Start: 2022-04-22 | End: 2022-05-23 | Stop reason: SDUPTHER

## 2022-04-22 NOTE — PROGRESS NOTES
Marsha's pt    Rx requested:  Requested Prescriptions     Pending Prescriptions Disp Refills    HYDROcodone-acetaminophen (NORCO) 5-325 MG per tablet 90 tablet 0     Sig: Take 1 tablet by mouth every 8 hours as needed for Pain for up to 30 days.        Last Office Visit:   Visit date not found      Last filled:  3/21/22    Next Visit Date:  Future Appointments   Date Time Provider Karolina Marin   4/28/2022  2:00 PM REINA Shaw - CNP [address] [facility]

## 2022-04-28 ENCOUNTER — OFFICE VISIT (OUTPATIENT)
Dept: PALLATIVE CARE | Age: 57
End: 2022-04-28
Payer: COMMERCIAL

## 2022-04-28 VITALS
SYSTOLIC BLOOD PRESSURE: 125 MMHG | TEMPERATURE: 97.3 F | DIASTOLIC BLOOD PRESSURE: 75 MMHG | HEART RATE: 65 BPM | RESPIRATION RATE: 18 BRPM | OXYGEN SATURATION: 98 %

## 2022-04-28 DIAGNOSIS — F33.3 SEVERE EPISODE OF RECURRENT MAJOR DEPRESSIVE DISORDER, WITH PSYCHOTIC FEATURES (HCC): ICD-10-CM

## 2022-04-28 DIAGNOSIS — K59.03 CONSTIPATION DUE TO PAIN MEDICATION: ICD-10-CM

## 2022-04-28 DIAGNOSIS — Z51.5 PALLIATIVE CARE ENCOUNTER: ICD-10-CM

## 2022-04-28 DIAGNOSIS — G89.3 NEOPLASM RELATED PAIN: Primary | ICD-10-CM

## 2022-04-28 DIAGNOSIS — C34.92 MALIGNANT NEOPLASM OF LEFT LUNG, UNSPECIFIED PART OF LUNG (HCC): ICD-10-CM

## 2022-04-28 DIAGNOSIS — J44.9 CHRONIC OBSTRUCTIVE PULMONARY DISEASE, UNSPECIFIED COPD TYPE (HCC): ICD-10-CM

## 2022-04-28 DIAGNOSIS — F20.9 SCHIZOPHRENIA, UNSPECIFIED TYPE (HCC): ICD-10-CM

## 2022-04-28 PROCEDURE — 99215 OFFICE O/P EST HI 40 MIN: CPT | Performed by: NURSE PRACTITIONER

## 2022-04-28 PROCEDURE — 1036F TOBACCO NON-USER: CPT | Performed by: NURSE PRACTITIONER

## 2022-04-28 PROCEDURE — G8427 DOCREV CUR MEDS BY ELIG CLIN: HCPCS | Performed by: NURSE PRACTITIONER

## 2022-04-28 PROCEDURE — G8421 BMI NOT CALCULATED: HCPCS | Performed by: NURSE PRACTITIONER

## 2022-04-28 PROCEDURE — 3023F SPIROM DOC REV: CPT | Performed by: NURSE PRACTITIONER

## 2022-04-28 PROCEDURE — 3017F COLORECTAL CA SCREEN DOC REV: CPT | Performed by: NURSE PRACTITIONER

## 2022-04-28 RX ORDER — MELOXICAM 7.5 MG/1
7.5 TABLET ORAL DAILY
Qty: 30 TABLET | Refills: 3 | Status: SHIPPED | OUTPATIENT
Start: 2022-04-28 | End: 2022-05-28

## 2022-04-28 RX ORDER — SENNA AND DOCUSATE SODIUM 50; 8.6 MG/1; MG/1
2 TABLET, FILM COATED ORAL 2 TIMES DAILY
Qty: 120 TABLET | Refills: 3 | Status: SHIPPED | OUTPATIENT
Start: 2022-04-28

## 2022-04-28 ASSESSMENT — ENCOUNTER SYMPTOMS
DIARRHEA: 0
ABDOMINAL PAIN: 0
SHORTNESS OF BREATH: 0
VOMITING: 0
ABDOMINAL DISTENTION: 0
CHOKING: 0
COUGH: 0
SORE THROAT: 0
CONSTIPATION: 0
ANAL BLEEDING: 0
EYE DISCHARGE: 0
COLOR CHANGE: 0
BACK PAIN: 1
BLOOD IN STOOL: 0
STRIDOR: 0
TROUBLE SWALLOWING: 0
RECTAL PAIN: 0
VOICE CHANGE: 0
APNEA: 0
CHEST TIGHTNESS: 0
NAUSEA: 0
WHEEZING: 0

## 2022-04-28 NOTE — PROGRESS NOTES
Subjective:      Patient Id:  Zoraida Renteria is a 61 y.o. female who presents today with   Chief Complaint   Patient presents with    Pain    Shortness of Breath          HPI     Seen at [facility] clinic for symptom management follow up rt Lung Cancer, COPD, CAD, DM 2, HTN, HLD. Bipolar 1, schizophrenia, and history of alcohol abuse. Today demeanor calm and relaxed, NAD, no sedation. Being treated by Dr Evette Ocampo of [facility] for [facility] of left lung    resection of a soft tissue mass form the abdominal wall with pah showing poorly differentiated carcinoma with squamous features on 6/11/19. Pt seen in oncology >4 months later, after hospitalization at [facility] for CP. Initial CXR showed a L suprahilar mass and CT chest obtained on 11//19 showed an approximately 10cm L upper chest mass with associated encasement and narrowing of L hilar vessels and central airways c/w malignancy/bronchogenic carcinoma until proven otherwise. Small L pleural effusion. \" No evidence of metastatic disease on CT a/p except for trace nonspecific fluid in the pelvis. PET/CT as well as bronch with EBUS ordered Confirming disease only in the large L upper chest mass (all bx of mediastinal nodes negative for malignancy). Pt's case was discussed in tumor board meeting on 1/6/19 with consensus recommendation to proceed with aggressive tx with chemo/RT given now no evidence of metastatic disease and overall relatively indolent course. Pt completed concurrent chemo/RT with weekly carbo/taxol on 3/6/20. Maintenance tx with durvalumab started on 4/16/20. ct scan 9/21/21 shows stable disease, with left superior mediastinal mass and  Partially loculated small to moderate left pleural effusion, Groundglass opacities and subcentimeter nodular opacities in right   lung, stable      maintenance durvalumab finished 4/29/21       Today she complains of pain in her thoracic to low back in spinal area with radiation down bilateral legs. No known injuries. Increasing in severity started about a month ago. Her routine Norco helps some, movement makes it worse. No LOC of bowel bladder, or feet. No heaviness or weakness. She complains of end of dose pain with [place]    Recent imaging on 3/25/22 shows:      1.  Diffuse consolidative opacities involving the left hemithorax,,   volume loss involving left hemithorax, stable. 2.  Left-sided mediastinal mass, stable to less conspicuous, partially   obscured by adjacent consolidation, stable to less conspicuous from prior   study of . 3. Ali Cleaves prominent mediastinal lymph nodes, soft tissue prominence at   the right hilum, stable. 4.  Subcentimeter right-sided pulmonary nodules, stable. Negative headaches, dizziness, or vision changes. No dysphagia, dysgeusia, or mouth sores; weight stable, Appetite is good. No GI or  concerns. No renato blood in stool or urine. SOB and edema at baseline. Lungs sound wheezy, she is out of albuterol HFA. She only uses her nebulizer prn. Negative excessive fatigue, fever, chills, myalgias, increased sputum production or cough, nausea, vomiting, chest pain, or orthopnea. No significant anxiety, agitation, depression, or sleep disturbance. Excited about two new grandchildren about to be born.     Past Medical History:   Diagnosis Date    Abnormal Pap smear of cervix     Asthma     Bacterial vaginosis     Bipolar 1 disorder ([address]     CAD (coronary artery disease)     COPD (chronic obstructive pulmonary disease) (HCC)     chronic bronchitis    Diabetes ([address]     Ectopic pregnancy     Hypertension     Lung cancer ([address]     Obesity     Schizophrenia ([address]     Type 2 diabetes mellitus without complication ([address]      Past Surgical History:   Procedure Laterality Date    ABDOMEN SURGERY       SECTION      CHOLECYSTECTOMY      DILATION AND CURETTAGE OF UTERUS      TUBAL LIGATION      UMBILICAL HERNIA REPAIR       Social History     Socioeconomic History    Marital status:      Spouse name: Not on file    Number of children: Not on file    Years of education: Not on file    Highest education level: Not on file   Occupational History    Not on file   Tobacco Use    Smoking status: Former Smoker     Packs/day: 1.50     Years: 38.00     Pack years: 57.00     Types: Cigarettes     Start date: 45     Quit date: 2019     Years since quittin.4    Smokeless tobacco: Never Used   Vaping Use    Vaping Use: Never used   Substance and Sexual Activity    Alcohol use: Not Currently    Drug use: Not Currently    Sexual activity: Yes   Other Topics Concern    Not on file   Social History Narrative    Not on file     Social Determinants of Health     Financial Resource Strain:     Difficulty of Paying Living Expenses: Not on file   Food Insecurity:     Worried About Running Out of Food in the Last Year: Not on file    [place] of Food in the Last Year: Not on file   Transportation Needs:     Lack of Transportation (Medical): Not on file    Lack of Transportation (Non-Medical):  Not on file   Physical Activity:     Days of Exercise per Week: Not on file    Minutes of Exercise per Session: Not on file   Stress:     Feeling of Stress : Not on file   Social Connections:     Frequency of Communication with Friends and Family: Not on file    Frequency of Social Gatherings with Friends and Family: Not on file    Attends Restorationism Services: Not on file    Active Member of Clubs or Organizations: Not on file    Attends Club or Organization Meetings: Not on file    Marital Status: Not on file   Intimate Partner Violence:     Fear of Current or Ex-Partner: Not on file    Emotionally Abused: Not on file    Physically Abused: Not on file    Sexually Abused: Not on file   Housing Stability:     Unable to Pay for Housing in the Last Year: Not on file    Number of [place] in the Last Year: Not on file    Unstable Housing in the Last Year: Not on file     Family History   Problem Relation Age of Onset    Diabetes Mother     High Blood Pressure Mother     Coronary Art Dis Father     High Blood Pressure Father     Diabetes Father      Allergies   Allergen Reactions    Morphine Anaphylaxis    Demerol Hcl [Meperidine] Itching    Lyrica [Pregabalin] Other (See Comments)     Dizzy, hallucinations depression, mood swings    Gabapentin Other (See Comments)     Agitation, anxiety, mood swings     Current Outpatient Medications on File Prior to Visit   Medication Sig Dispense Refill    HYDROcodone-acetaminophen (NORCO) 5-325 MG per tablet Take 1 tablet by mouth every 8 hours as needed for Pain for up to 30 days. 90 tablet 0    albuterol sulfate  (90 Base) MCG/ACT inhaler Inhale 2 puffs into the lungs every 6 hours as needed for Wheezing or Shortness of Breath 18 g 5    polyethylene glycol (MIRALAX) 17 g PACK packet Take 17 g by mouth daily      ipratropium-albuterol (DUONEB) 0.5-2.5 (3) MG/3ML SOLN nebulizer solution Inhale 1 vial into the lungs every 4 hours      potassium chloride (KLOR-CON M) 20 MEQ extended release tablet Take 20 mEq by mouth      amLODIPine (NORVASC) 10 MG tablet Take 10 mg by mouth daily      glipiZIDE (GLUCOTROL) 5 MG tablet Take 5 mg by mouth daily      losartan (COZAAR) 50 MG tablet Take 50 mg by mouth daily       No current facility-administered medications on file prior to visit. Review of Systems   Constitutional: Negative for activity change, appetite change, chills, diaphoresis, fatigue, fever and unexpected weight change. HENT: Negative for drooling, hearing loss, mouth sores, sore throat, trouble swallowing and voice change. Eyes: Negative for discharge and visual disturbance. Respiratory: Negative for apnea, cough, choking, chest tightness, shortness of breath, wheezing and stridor. Cardiovascular: Negative for chest pain, palpitations and leg swelling.    Gastrointestinal: Negative for abdominal distention, abdominal pain, anal bleeding, blood in stool, constipation, diarrhea, nausea, rectal pain and vomiting. Genitourinary: Negative for difficulty urinating, dysuria, enuresis, frequency and hematuria. Musculoskeletal: Positive for back pain. Negative for arthralgias, gait problem, joint swelling and myalgias. Skin: Negative for color change, pallor, rash and wound. Allergic/Immunologic: Negative for food allergies and immunocompromised state. Neurological: Negative for dizziness, tremors, seizures, syncope, facial asymmetry, speech difficulty, weakness, light-headedness, numbness and headaches. Hematological: Negative for adenopathy. Does not bruise/bleed easily. Psychiatric/Behavioral: Negative for agitation, behavioral problems, confusion, decreased concentration, dysphoric mood, hallucinations, self-injury, sleep disturbance and suicidal ideas. The patient is not nervous/anxious and is not hyperactive. Objective:   /75   Pulse 65   Temp 97.3 °F (36.3 °C)   Resp 18   SpO2 98%     Physical Exam  Constitutional:       General: She is not in acute distress. Appearance: She is well-developed. She is not diaphoretic. HENT:      Head: Normocephalic and atraumatic. Right Ear: External ear normal.      Left Ear: External ear normal.      Nose: Nose normal.      Mouth/Throat:      Pharynx: No oropharyngeal exudate. Eyes:      General: No scleral icterus. Right eye: No discharge. Left eye: No discharge. Conjunctiva/sclera: Conjunctivae normal.      Pupils: Pupils are equal, round, and reactive to light. Neck:      Thyroid: No thyromegaly. Vascular: No JVD. Trachea: No tracheal deviation. Cardiovascular:      Rate and Rhythm: Normal rate and regular rhythm. Heart sounds: Normal heart sounds. Pulmonary:      Effort: Pulmonary effort is normal. No respiratory distress. Breath sounds: No stridor. Decreased breath sounds and wheezing present. No rales. Chest:      Chest wall: No tenderness. Abdominal:      General: Bowel sounds are normal. There is no distension. Palpations: Abdomen is soft. There is no mass. Tenderness: There is no abdominal tenderness. There is no guarding or rebound. Musculoskeletal:         General: No tenderness or deformity. Normal range of motion. Cervical back: Normal range of motion and neck supple. Lymphadenopathy:      Cervical: No cervical adenopathy. Skin:     General: Skin is warm and dry. Capillary Refill: Capillary refill takes less than 2 seconds. Findings: No erythema or rash. Neurological:      Mental Status: She is alert and oriented to person, place, and time. Psychiatric:         Behavior: Behavior normal.         Thought Content: Thought content normal.           Assessment:       Diagnosis Orders   1. Neoplasm related pain     2. Constipation due to pain medication     3. Palliative care encounter     4. Chronic obstructive pulmonary disease, unspecified COPD type ([facility]     5. Malignant neoplasm of left lung, unspecified part of lung ([address]     6. Schizophrenia, unspecified type ([facility]     7. Severe episode of recurrent major depressive disorder, with psychotic features ([facility]            Plan:    Pain rt neoplasm and neuropathy post radiation    - Start Mobic 7.5 mg po daily, take with food, reviewed alarm signs of GI bleed  - Continue  Norco 5/325 mg po every 8  Hours prn moderate to severe pain  - Heat or ice to painful areas, whichever is preferred  - Gentle ROM exercises  Pt is tolerating current pain meds without adverse effects or over sedation. Lowest effective dose used. Pt advised to call and notify palliative care for any adverse effects or sedation  Pt is able to maintain adequate functional level and participate in ADLs  OARRS reviewed.  There is no indication of aberrant behavior  Pt advised to call for increasing or uncontrolled pain  Risk vs benefit assessed. Pt educated on risk of addiction. Pt advised to take only as prescribed and not to change frequency of pain meds without consulting palliative care first.    Left chest wall and thoracic back pain  - Discussed possible etiology, either orthopedic or neoplastic related  - Reviewed alarm signs of cauda equina  - Ordered lumbar and thoracic x ray    Bipolar 1 and Schizophrenia in remission  - follow with psych as she is using high risk medications and she is undergoing a high stress medical treatment. Slow Transit Constipation due to opioid use  - Continue current POC  - Increase fluid and fiber in diet  - Exercise as tolerated  - Hold all for loose stool  - Call if no BM in three days    COPD / SOB  - Ordered Albuterol HFA  - Encouraged use of Duo neb treatments  - follow with pulmonology  - Call for increased SOB, cough, sputum production, fatigue. REINA Huntley - CNP      Total Time 45 mins      Total time includes reviewing labs, reviewing history, medications, and allergies, counseling patient, performing medically appropriate evaluation and examination, discussing care with healthcare providers,family, and documenting in the medical record.

## 2022-05-23 DIAGNOSIS — Z51.5 PALLIATIVE CARE ENCOUNTER: ICD-10-CM

## 2022-05-23 DIAGNOSIS — G89.3 NEOPLASM RELATED PAIN: ICD-10-CM

## 2022-05-23 RX ORDER — HYDROCODONE BITARTRATE AND ACETAMINOPHEN 5; 325 MG/1; MG/1
1 TABLET ORAL EVERY 8 HOURS PRN
Qty: 90 TABLET | Refills: 0 | Status: SHIPPED | OUTPATIENT
Start: 2022-05-23 | End: 2022-06-22 | Stop reason: SDUPTHER

## 2022-06-22 DIAGNOSIS — G89.3 NEOPLASM RELATED PAIN: ICD-10-CM

## 2022-06-22 DIAGNOSIS — Z51.5 PALLIATIVE CARE ENCOUNTER: ICD-10-CM

## 2022-06-22 RX ORDER — HYDROCODONE BITARTRATE AND ACETAMINOPHEN 5; 325 MG/1; MG/1
1 TABLET ORAL EVERY 8 HOURS PRN
Qty: 90 TABLET | Refills: 0 | Status: SHIPPED | OUTPATIENT
Start: 2022-06-22 | End: 2022-07-22 | Stop reason: SDUPTHER

## 2022-06-22 NOTE — PROGRESS NOTES
Rx requested:  Requested Prescriptions     Pending Prescriptions Disp Refills    HYDROcodone-acetaminophen (NORCO) 5-325 MG per tablet 90 tablet 0     Sig: Take 1 tablet by mouth every 8 hours as needed for Pain for up to 30 days.        Last Office Visit:   Visit date not found      Last filled:  5/23/22    Next Visit Date:  Future Appointments   Date Time Provider Karolina Marin   8/4/2022 10:00 AM Unk Sandhoff, APRN - CNP [facility]

## 2022-07-12 NOTE — TELEPHONE ENCOUNTER
Rx requested:  Requested Prescriptions     Pending Prescriptions Disp Refills    HYDROcodone-acetaminophen (NORCO) 5-325 MG per tablet 90 tablet 0     Sig: Take 1 tablet by mouth every 8 hours as needed for Pain for up to 30 days.        Last Office Visit:   1/6/2022      Last filled:  1/20/22    Next Visit Date:  Future Appointments   Date Time Provider Karolina Marin   3/3/2022  1:00 PM James Foster APRN - CNP [address] [facility] [place] Counseling:  I discussed with the patient the risks of topical clascoterone including but not limited to erythema, scaling, itching, and stinging. Patient voiced their understanding.

## 2022-07-22 DIAGNOSIS — G89.3 NEOPLASM RELATED PAIN: ICD-10-CM

## 2022-07-22 DIAGNOSIS — Z51.5 PALLIATIVE CARE ENCOUNTER: ICD-10-CM

## 2022-07-22 RX ORDER — HYDROCODONE BITARTRATE AND ACETAMINOPHEN 5; 325 MG/1; MG/1
1 TABLET ORAL EVERY 8 HOURS PRN
Qty: 90 TABLET | Refills: 0 | Status: SHIPPED | OUTPATIENT
Start: 2022-07-22 | End: 2022-08-23 | Stop reason: SDUPTHER

## 2022-07-22 NOTE — PROGRESS NOTES
Rx requested:  Requested Prescriptions     Pending Prescriptions Disp Refills    HYDROcodone-acetaminophen (NORCO) 5-325 MG per tablet 90 tablet 0     Sig: Take 1 tablet by mouth every 8 hours as needed for Pain for up to 30 days.        Last Office Visit:   Visit date not found      Last filled:  6/22/22    Next Visit Date:  Future Appointments   Date Time Provider Karolina Marin   8/4/2022 10:00 AM REINA Keating CNP [address] [facility]

## 2022-08-04 ENCOUNTER — OFFICE VISIT (OUTPATIENT)
Dept: PALLATIVE CARE | Age: 57
End: 2022-08-04
Payer: COMMERCIAL

## 2022-08-04 VITALS
DIASTOLIC BLOOD PRESSURE: 54 MMHG | HEART RATE: 73 BPM | RESPIRATION RATE: 18 BRPM | OXYGEN SATURATION: 97 % | SYSTOLIC BLOOD PRESSURE: 132 MMHG

## 2022-08-04 DIAGNOSIS — K59.03 CONSTIPATION DUE TO PAIN MEDICATION: ICD-10-CM

## 2022-08-04 DIAGNOSIS — F20.9 SCHIZOPHRENIA, UNSPECIFIED TYPE (HCC): ICD-10-CM

## 2022-08-04 DIAGNOSIS — G89.29 CHRONIC LEFT-SIDED THORACIC BACK PAIN: Primary | ICD-10-CM

## 2022-08-04 DIAGNOSIS — Z51.5 PALLIATIVE CARE ENCOUNTER: ICD-10-CM

## 2022-08-04 DIAGNOSIS — R06.02 SOB (SHORTNESS OF BREATH): ICD-10-CM

## 2022-08-04 DIAGNOSIS — G89.3 NEOPLASM RELATED PAIN: ICD-10-CM

## 2022-08-04 DIAGNOSIS — F31.9 BIPOLAR I DISORDER (HCC): ICD-10-CM

## 2022-08-04 DIAGNOSIS — J44.9 CHRONIC OBSTRUCTIVE PULMONARY DISEASE, UNSPECIFIED COPD TYPE (HCC): ICD-10-CM

## 2022-08-04 DIAGNOSIS — M54.6 CHRONIC LEFT-SIDED THORACIC BACK PAIN: Primary | ICD-10-CM

## 2022-08-04 PROCEDURE — 3023F SPIROM DOC REV: CPT | Performed by: NURSE PRACTITIONER

## 2022-08-04 PROCEDURE — 3017F COLORECTAL CA SCREEN DOC REV: CPT | Performed by: NURSE PRACTITIONER

## 2022-08-04 PROCEDURE — 99215 OFFICE O/P EST HI 40 MIN: CPT | Performed by: NURSE PRACTITIONER

## 2022-08-04 PROCEDURE — 1036F TOBACCO NON-USER: CPT | Performed by: NURSE PRACTITIONER

## 2022-08-04 PROCEDURE — G8421 BMI NOT CALCULATED: HCPCS | Performed by: NURSE PRACTITIONER

## 2022-08-04 PROCEDURE — G8427 DOCREV CUR MEDS BY ELIG CLIN: HCPCS | Performed by: NURSE PRACTITIONER

## 2022-08-04 RX ORDER — PREDNISONE 10 MG/1
TABLET ORAL
Qty: 30 TABLET | Refills: 0 | Status: SHIPPED | OUTPATIENT
Start: 2022-08-04

## 2022-08-04 RX ORDER — DOXYCYCLINE HYCLATE 100 MG
100 TABLET ORAL 2 TIMES DAILY
Qty: 20 TABLET | Refills: 0 | Status: SHIPPED | OUTPATIENT
Start: 2022-08-04 | End: 2022-08-14

## 2022-08-04 NOTE — PROGRESS NOTES
Subjective:      Patient Id:  Ana Lilia Burns is a 61 y.o. female who presents today with   Chief Complaint   Patient presents with    Pain          HPI     Seen at [facility] clinic for symptom management follow up rt Lung Cancer, COPD, CAD, DM 2, HTN, HLD. Bipolar 1, schizophrenia, and history of alcohol abuse. Today demeanor calm and relaxed, NAD, no sedation. Being treated by Dr Jaya Veloz of [facility] for [facility] of left lung    resection of a soft tissue mass form the abdominal wall with pah showing poorly differentiated carcinoma with squamous features on 6/11/19. Pt seen in oncology >4 months later, after hospitalization at [facility] for CP. Initial CXR showed a L suprahilar mass and CT chest obtained on 11//19 showed an approximately 10cm L upper chest mass with associated encasement and narrowing of L hilar vessels and central airways c/w malignancy/bronchogenic carcinoma until proven otherwise. Small L pleural effusion. \" No evidence of metastatic disease on CT a/p except for trace nonspecific fluid in the pelvis. PET/CT as well as bronch with EBUS ordered Confirming disease only in the large L upper chest mass (all bx of mediastinal nodes negative for malignancy). Pt's case was discussed in tumor board meeting on 1/6/19 with consensus recommendation to proceed with aggressive tx with chemo/RT given now no evidence of metastatic disease and overall relatively indolent course. Pt completed concurrent chemo/RT with weekly carbo/taxol on 3/6/20. Maintenance tx with durvalumab started on 4/16/20. ct scan 9/21/21 shows stable disease, with left superior mediastinal mass and  Partially loculated small to moderate left pleural effusion, Groundglass opacities and subcentimeter nodular opacities in right   lung, stable      maintenance durvalumab finished 4/29/21       Today she complains of pain in her thoracic to low back in spinal area with radiation down bilateral legs. No known injuries.  Increasing in severity started about a month ago. Her routine Norco helps some, movement makes it worse. No LOC of bowel bladder, or feet. No heaviness or weakness. She complains of end of dose pain with [place]    Recent imaging on 3/25/22 shows:      1. Diffuse consolidative opacities involving the left hemithorax,,   volume loss involving left hemithorax, stable. 2.  Left-sided mediastinal mass, stable to less conspicuous, partially   obscured by adjacent consolidation, stable to less conspicuous from prior   study of .   3.  Mildly prominent mediastinal lymph nodes, soft tissue prominence at   the right hilum, stable. 4.  Subcentimeter right-sided pulmonary nodules, stable. Oncology follow up plan   repeat imaging form last mosnth shows stable findings and no evidence of active disease, today's clinical exam is benign and she has no new concerning sx, so plan for continued surveillance        Negative headaches, dizziness, or vision changes. No dysphagia, dysgeusia, or mouth sores; weight stable, Appetite is good. No GI or  concerns. No renato blood in stool or urine. SOB and edema at baseline. Lungs sound wheezy, and she has increased fatigue, body aches and SOB. Negative fever, chills, myalgias, increased sputum production or cough, nausea, vomiting, chest pain, or orthopnea. Two recent deaths in the family, has increased anxiety, is working with [employer].     Past Medical History:   Diagnosis Date    Abnormal Pap smear of cervix     Asthma     Bacterial vaginosis     Bipolar 1 disorder (HCC)     CAD (coronary artery disease)     COPD (chronic obstructive pulmonary disease) (HCC)     chronic bronchitis    Diabetes ([address]     Ectopic pregnancy     Hypertension     Lung cancer ([address]     Obesity     Schizophrenia ([address]     Type 2 diabetes mellitus without complication ([address]      Past Surgical History:   Procedure Laterality Date    ABDOMEN SURGERY       SECTION      CHOLECYSTECTOMY      DILATION food 30 tablet 3    albuterol sulfate  (90 Base) MCG/ACT inhaler Inhale 2 puffs into the lungs every 6 hours as needed for Wheezing or Shortness of Breath 18 g 5    polyethylene glycol (MIRALAX) 17 g PACK packet Take 17 g by mouth daily      ipratropium-albuterol (DUONEB) 0.5-2.5 (3) MG/3ML SOLN nebulizer solution Inhale 1 vial into the lungs every 4 hours      potassium chloride (KLOR-CON M) 20 MEQ extended release tablet Take 20 mEq by mouth      amLODIPine (NORVASC) 10 MG tablet Take 10 mg by mouth daily      glipiZIDE (GLUCOTROL) 5 MG tablet Take 5 mg by mouth daily      losartan (COZAAR) 50 MG tablet Take 50 mg by mouth daily       No current facility-administered medications on file prior to visit. Review of Systems      Objective:   BP (!) 132/54   Pulse 73   Resp 18   SpO2 97%     Physical Exam  Constitutional:       General: She is not in acute distress. Appearance: She is well-developed. She is not diaphoretic. HENT:      Head: Normocephalic and atraumatic. Right Ear: External ear normal.      Left Ear: External ear normal.      Nose: Nose normal.      Mouth/Throat:      Pharynx: No oropharyngeal exudate. Eyes:      General: No scleral icterus. Right eye: No discharge. Left eye: No discharge. Conjunctiva/sclera: Conjunctivae normal.      Pupils: Pupils are equal, round, and reactive to light. Neck:      Thyroid: No thyromegaly. Vascular: No JVD. Trachea: No tracheal deviation. Cardiovascular:      Rate and Rhythm: Normal rate and regular rhythm. Heart sounds: Normal heart sounds. Pulmonary:      Effort: Pulmonary effort is normal. No respiratory distress. Breath sounds: No stridor. Decreased breath sounds and wheezing present. No rales. Chest:      Chest wall: No tenderness. Abdominal:      General: Bowel sounds are normal. There is no distension. Palpations: Abdomen is soft. There is no mass. Tenderness:  There is no abdominal tenderness. There is no guarding or rebound. Musculoskeletal:         General: No tenderness or deformity. Normal range of motion. Cervical back: Normal range of motion and neck supple. Lymphadenopathy:      Cervical: No cervical adenopathy. Skin:     General: Skin is warm and dry. Capillary Refill: Capillary refill takes less than 2 seconds. Findings: No erythema or rash. Neurological:      Mental Status: She is alert and oriented to person, place, and time. Psychiatric:         Behavior: Behavior normal.         Thought Content: Thought content normal.         Assessment:       Diagnosis Orders   1. Chronic left-sided thoracic back pain  CHRISTIANA - Clayton Delong MD, Pain Management, [place]      2. Neoplasm related pain        3. SOB (shortness of breath)        4. Chronic obstructive pulmonary disease, unspecified COPD type ([facility]        5. Palliative care encounter        6. Bipolar I disorder ([facility]        7. Schizophrenia, unspecified type ([facility]        8. Constipation due to pain medication               Plan:    Pain rt neoplasm and neuropathy post radiation  -  As she is in survivorship phase of cancer, will transition her to pain management    - Continue  Norco 5/325 mg po every 8  Hours prn moderate to severe pain  - Heat or ice to painful areas, whichever is preferred  - Gentle ROM exercises  Pt is tolerating current pain meds without adverse effects or over sedation. Lowest effective dose used. Pt advised to call and notify palliative care for any adverse effects or sedation  Pt is able to maintain adequate functional level and participate in ADLs  OARRS reviewed. There is no indication of aberrant behavior  Pt advised to call for increasing or uncontrolled pain  Risk vs benefit assessed. Pt educated on risk of addiction.    Pt advised to take only as prescribed and not to change frequency of pain meds without consulting palliative care first.    Left chest wall and thoracic back pain  - Discussed possible etiology, either orthopedic or neoplastic related  - recent imaging shows no disease progression, just degenerative changes    Bipolar 1 and Schizophrenia in remission  - following with DONNELL as she is using high risk medications and she is undergoing a high stress medical treatment. [place] Constipation due to opioid use  - Continue current POC  - Increase fluid and fiber in diet  - Exercise as tolerated  - Hold all for loose stool  - Call if no BM in three days    COPD / SOB  - Start prednisone taper and doxy  - Encouraged use of Duo neb treatments  - follow with pulmonology  - Call for increased SOB, cough, sputum production, fatigue. Nathaly Elkins, APRN - CNP      Total Time 45 mins      Total time includes reviewing labs, reviewing history, medications, and allergies, counseling patient, performing medically appropriate evaluation and examination, discussing care with healthcare providers,family, and documenting in the medical record.

## 2022-08-23 DIAGNOSIS — G89.3 NEOPLASM RELATED PAIN: ICD-10-CM

## 2022-08-23 DIAGNOSIS — Z51.5 PALLIATIVE CARE ENCOUNTER: ICD-10-CM

## 2022-08-23 RX ORDER — HYDROCODONE BITARTRATE AND ACETAMINOPHEN 5; 325 MG/1; MG/1
1 TABLET ORAL EVERY 8 HOURS PRN
Qty: 90 TABLET | Refills: 0 | Status: SHIPPED | OUTPATIENT
Start: 2022-08-23 | End: 2022-09-22 | Stop reason: SDUPTHER

## 2022-09-22 DIAGNOSIS — M54.6 CHRONIC BILATERAL THORACIC BACK PAIN: ICD-10-CM

## 2022-09-22 DIAGNOSIS — G89.29 CHRONIC BILATERAL THORACIC BACK PAIN: ICD-10-CM

## 2022-09-22 DIAGNOSIS — G62.9 NEUROPATHY: Primary | ICD-10-CM

## 2022-09-22 DIAGNOSIS — Z51.5 PALLIATIVE CARE ENCOUNTER: ICD-10-CM

## 2022-09-22 DIAGNOSIS — G89.3 NEOPLASM RELATED PAIN: ICD-10-CM

## 2022-09-22 RX ORDER — HYDROCODONE BITARTRATE AND ACETAMINOPHEN 5; 325 MG/1; MG/1
1 TABLET ORAL EVERY 8 HOURS PRN
Qty: 90 TABLET | Refills: 0 | Status: SHIPPED | OUTPATIENT
Start: 2022-09-22 | End: 2022-10-22

## 2022-09-22 NOTE — PROGRESS NOTES
Dilma Patino called in to explain that she has had \"a lot of death in the family\" and didn't think about making an appt with her new pain mngt doctor. I gave her the name and phone number to call, she called back and was confused by their answering service. I told her I would call and make the appt for her. When [facility] they told me that they don't take her insurance. Please let me know who you will refer her to, I can call and make her appt. She is hoping you will fill her meds for 1 more month until she has an appt with pain mngt. Rx requested:  Requested Prescriptions     Pending Prescriptions Disp Refills    HYDROcodone-acetaminophen (NORCO) 5-325 MG per tablet 90 tablet 0     Sig: Take 1 tablet by mouth every 8 hours as needed for Pain for up to 30 days. Last Office Visit:   Visit date not found      Last filled:  8/23/22    Next Visit Date:  No future appointments.

## 2022-10-20 ENCOUNTER — INITIAL CONSULT (OUTPATIENT)
Dept: PAIN MANAGEMENT | Age: 57
End: 2022-10-20
Payer: COMMERCIAL

## 2022-10-20 VITALS
DIASTOLIC BLOOD PRESSURE: 62 MMHG | WEIGHT: 184 LBS | HEIGHT: 61 IN | TEMPERATURE: 96.8 F | BODY MASS INDEX: 34.74 KG/M2 | SYSTOLIC BLOOD PRESSURE: 114 MMHG

## 2022-10-20 DIAGNOSIS — C34.92 MALIGNANT NEOPLASM OF LEFT LUNG, UNSPECIFIED PART OF LUNG (HCC): ICD-10-CM

## 2022-10-20 DIAGNOSIS — C34.90 SQUAMOUS CELL CARCINOMA OF LUNG, UNSPECIFIED LATERALITY (HCC): ICD-10-CM

## 2022-10-20 DIAGNOSIS — M54.9 MID BACK PAIN: Primary | ICD-10-CM

## 2022-10-20 PROCEDURE — G8417 CALC BMI ABV UP PARAM F/U: HCPCS | Performed by: PHYSICAL MEDICINE & REHABILITATION

## 2022-10-20 PROCEDURE — G8427 DOCREV CUR MEDS BY ELIG CLIN: HCPCS | Performed by: PHYSICAL MEDICINE & REHABILITATION

## 2022-10-20 PROCEDURE — 3017F COLORECTAL CA SCREEN DOC REV: CPT | Performed by: PHYSICAL MEDICINE & REHABILITATION

## 2022-10-20 PROCEDURE — 1036F TOBACCO NON-USER: CPT | Performed by: PHYSICAL MEDICINE & REHABILITATION

## 2022-10-20 PROCEDURE — 99203 OFFICE O/P NEW LOW 30 MIN: CPT | Performed by: PHYSICAL MEDICINE & REHABILITATION

## 2022-10-20 PROCEDURE — G8484 FLU IMMUNIZE NO ADMIN: HCPCS | Performed by: PHYSICAL MEDICINE & REHABILITATION

## 2022-10-20 ASSESSMENT — ENCOUNTER SYMPTOMS
BACK PAIN: 1
SHORTNESS OF BREATH: 0
CONSTIPATION: 0
NAUSEA: 0
DIARRHEA: 0
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
## 2024-01-28 ENCOUNTER — HOSPITAL ENCOUNTER (EMERGENCY)
Facility: HOSPITAL | Age: 59
Discharge: HOME | End: 2024-01-29
Payer: COMMERCIAL

## 2024-01-28 DIAGNOSIS — N76.0 ACUTE VAGINITIS: ICD-10-CM

## 2024-01-28 DIAGNOSIS — Z20.2 POSSIBLE EXPOSURE TO STD: Primary | ICD-10-CM

## 2024-01-28 PROCEDURE — 99283 EMERGENCY DEPT VISIT LOW MDM: CPT | Mod: 25

## 2024-01-28 PROCEDURE — 99284 EMERGENCY DEPT VISIT MOD MDM: CPT

## 2024-01-28 ASSESSMENT — PAIN DESCRIPTION - PAIN TYPE: TYPE: ACUTE PAIN

## 2024-01-28 ASSESSMENT — LIFESTYLE VARIABLES
HAVE YOU EVER FELT YOU SHOULD CUT DOWN ON YOUR DRINKING: NO
EVER HAD A DRINK FIRST THING IN THE MORNING TO STEADY YOUR NERVES TO GET RID OF A HANGOVER: NO
HAVE PEOPLE ANNOYED YOU BY CRITICIZING YOUR DRINKING: NO
REASON UNABLE TO ASSESS: NO
EVER FELT BAD OR GUILTY ABOUT YOUR DRINKING: NO

## 2024-01-28 ASSESSMENT — PAIN - FUNCTIONAL ASSESSMENT: PAIN_FUNCTIONAL_ASSESSMENT: 0-10

## 2024-01-28 ASSESSMENT — PAIN DESCRIPTION - LOCATION: LOCATION: VAGINA

## 2024-01-28 ASSESSMENT — COLUMBIA-SUICIDE SEVERITY RATING SCALE - C-SSRS
6. HAVE YOU EVER DONE ANYTHING, STARTED TO DO ANYTHING, OR PREPARED TO DO ANYTHING TO END YOUR LIFE?: NO
1. IN THE PAST MONTH, HAVE YOU WISHED YOU WERE DEAD OR WISHED YOU COULD GO TO SLEEP AND NOT WAKE UP?: NO
2. HAVE YOU ACTUALLY HAD ANY THOUGHTS OF KILLING YOURSELF?: NO

## 2024-01-28 ASSESSMENT — PAIN DESCRIPTION - DESCRIPTORS: DESCRIPTORS: ITCHING

## 2024-01-28 ASSESSMENT — PAIN SCALES - GENERAL: PAINLEVEL_OUTOF10: 6

## 2024-01-29 VITALS
OXYGEN SATURATION: 98 % | SYSTOLIC BLOOD PRESSURE: 152 MMHG | WEIGHT: 183 LBS | HEIGHT: 61 IN | TEMPERATURE: 97.9 F | DIASTOLIC BLOOD PRESSURE: 74 MMHG | RESPIRATION RATE: 17 BRPM | HEART RATE: 81 BPM | BODY MASS INDEX: 34.55 KG/M2

## 2024-01-29 LAB
APPEARANCE UR: CLEAR
BILIRUB UR STRIP.AUTO-MCNC: NEGATIVE MG/DL
C TRACH RRNA SPEC QL NAA+PROBE: NEGATIVE
CLUE CELLS VAG LPF-#/AREA: ABNORMAL /[LPF]
COLOR UR: YELLOW
GLUCOSE UR STRIP.AUTO-MCNC: ABNORMAL MG/DL
HOLD SPECIMEN: NORMAL
KETONES UR STRIP.AUTO-MCNC: NEGATIVE MG/DL
LEUKOCYTE ESTERASE UR QL STRIP.AUTO: ABNORMAL
N GONORRHOEA DNA SPEC QL PROBE+SIG AMP: NEGATIVE
NITRITE UR QL STRIP.AUTO: NEGATIVE
NUGENT SCORE: 3
PH UR STRIP.AUTO: 7 [PH]
PROT UR STRIP.AUTO-MCNC: NEGATIVE MG/DL
RBC # UR STRIP.AUTO: NEGATIVE /UL
RBC #/AREA URNS AUTO: ABNORMAL /HPF
SP GR UR STRIP.AUTO: 1.02
SQUAMOUS #/AREA URNS AUTO: ABNORMAL /HPF
UROBILINOGEN UR STRIP.AUTO-MCNC: 2 MG/DL
WBC #/AREA URNS AUTO: ABNORMAL /HPF
YEAST BUDDING #/AREA UR COMP ASSIST: PRESENT /HPF
YEAST VAG WET PREP-#/AREA: PRESENT

## 2024-01-29 PROCEDURE — 87086 URINE CULTURE/COLONY COUNT: CPT | Mod: ELYLAB | Performed by: PHYSICIAN ASSISTANT

## 2024-01-29 PROCEDURE — 87205 SMEAR GRAM STAIN: CPT | Mod: ELYLAB | Performed by: PHYSICIAN ASSISTANT

## 2024-01-29 PROCEDURE — 87800 DETECT AGNT MULT DNA DIREC: CPT | Mod: ELYLAB | Performed by: PHYSICIAN ASSISTANT

## 2024-01-29 PROCEDURE — 81001 URINALYSIS AUTO W/SCOPE: CPT | Performed by: PHYSICIAN ASSISTANT

## 2024-01-29 PROCEDURE — 2500000001 HC RX 250 WO HCPCS SELF ADMINISTERED DRUGS (ALT 637 FOR MEDICARE OP): Performed by: PHYSICIAN ASSISTANT

## 2024-01-29 PROCEDURE — 96372 THER/PROPH/DIAG INJ SC/IM: CPT

## 2024-01-29 PROCEDURE — 2500000004 HC RX 250 GENERAL PHARMACY W/ HCPCS (ALT 636 FOR OP/ED): Performed by: PHYSICIAN ASSISTANT

## 2024-01-29 RX ORDER — DOXYCYCLINE HYCLATE 100 MG
100 TABLET ORAL ONCE
Status: COMPLETED | OUTPATIENT
Start: 2024-01-29 | End: 2024-01-29

## 2024-01-29 RX ORDER — DOXYCYCLINE 100 MG/1
100 CAPSULE ORAL 2 TIMES DAILY
Qty: 20 CAPSULE | Refills: 0 | Status: SHIPPED | OUTPATIENT
Start: 2024-01-29 | End: 2024-02-08

## 2024-01-29 RX ORDER — FLUCONAZOLE 100 MG/1
150 TABLET ORAL ONCE
Status: COMPLETED | OUTPATIENT
Start: 2024-01-29 | End: 2024-01-29

## 2024-01-29 RX ORDER — FLUCONAZOLE 200 MG/1
200 TABLET ORAL DAILY
Qty: 7 TABLET | Refills: 0 | Status: SHIPPED | OUTPATIENT
Start: 2024-01-29 | End: 2024-02-05

## 2024-01-29 RX ORDER — CEFTRIAXONE 500 MG/1
500 INJECTION, POWDER, FOR SOLUTION INTRAMUSCULAR; INTRAVENOUS ONCE
Status: COMPLETED | OUTPATIENT
Start: 2024-01-29 | End: 2024-01-29

## 2024-01-29 RX ORDER — METRONIDAZOLE 500 MG/1
500 TABLET ORAL 3 TIMES DAILY
Qty: 21 TABLET | Refills: 0 | Status: SHIPPED | OUTPATIENT
Start: 2024-01-29 | End: 2024-02-05

## 2024-01-29 RX ADMIN — DOXYCYCLINE HYCLATE 100 MG: 100 TABLET, FILM COATED ORAL at 00:41

## 2024-01-29 RX ADMIN — CEFTRIAXONE 500 MG: 500 INJECTION, POWDER, FOR SOLUTION INTRAMUSCULAR; INTRAVENOUS at 00:41

## 2024-01-29 RX ADMIN — FLUCONAZOLE 150 MG: 100 TABLET ORAL at 00:41

## 2024-01-29 NOTE — ED PROVIDER NOTES
HPI   Chief Complaint   Patient presents with   • Vaginal Itching     Vaginal itching starting Friday after changing laundry soap on Thursday.        This is a 58-year-old female who presents to emergency room with chief complaint of vaginal itching.  Patient states that she recently had sex with her boyfriend and started noticing some itching a few days later.  She denies any vaginal discharge or urinary symptoms.  She denies any fevers, chills, flank pain or back pain.  She initially thought she was having reaction to a new detergent but now is concerned she may have been exposed to an STD.  No medications were taken for symptoms prior to arrival      History provided by:  Patient   used: No                        [place] Coma Scale Score: 15                  Patient History   No past medical history on file.  No past surgical history on file.  No family history on file.  Social History     Tobacco Use   • Smoking status: Not on file   • Smokeless tobacco: Not on file   Substance Use Topics   • Alcohol use: Not on file   • Drug use: Not on file       Physical Exam   ED Triage Vitals [01/28/24 2310]   Temperature Heart Rate Respirations BP   36.6 °C (97.9 °F) 91 18 (!) 167/91      Pulse Ox Temp Source Heart Rate Source Patient Position   97 % Temporal Monitor Sitting      BP Location FiO2 (%)     Right arm --       Physical Exam  Vitals and nursing note reviewed. Exam conducted with a chaperone present.   Constitutional:       General: She is not in acute distress.     Appearance: Normal appearance. She is normal weight. She is not ill-appearing or toxic-appearing.   Cardiovascular:      Rate and Rhythm: Normal rate and regular rhythm.      Pulses: Normal pulses.      Heart sounds: Normal heart sounds.   Pulmonary:      Effort: Pulmonary effort is normal.      Breath sounds: Normal breath sounds.   Abdominal:      General: Bowel sounds are normal. There is no distension.      Palpations: Abdomen  is soft.      Tenderness: There is no abdominal tenderness.   Genitourinary:     Comments: Exam deferred  Musculoskeletal:         General: Normal range of motion.   Skin:     General: Skin is warm and dry.      Capillary Refill: Capillary refill takes less than 2 seconds.   Neurological:      General: No focal deficit present.      Mental Status: She is alert and oriented to person, place, and time. Mental status is at baseline.   Psychiatric:         Mood and Affect: Mood normal.         Behavior: Behavior normal.         Thought Content: Thought content normal.         Judgment: Judgment normal.         ED Course & MDM   Diagnoses as of 01/29/24 0051   Possible exposure to STD   Acute vaginitis       Medical Decision Making      Procedure  Procedures     Gentry Zapata PA-C  01/29/24 0053

## 2024-01-30 LAB — BACTERIA UR CULT: NORMAL

## 2024-03-08 ENCOUNTER — APPOINTMENT (OUTPATIENT)
Dept: RADIOLOGY | Facility: HOSPITAL | Age: 59
DRG: 193 | End: 2024-03-08
Payer: COMMERCIAL

## 2024-03-08 ENCOUNTER — HOSPITAL ENCOUNTER (INPATIENT)
Facility: HOSPITAL | Age: 59
LOS: 6 days | Discharge: LEFT BEFORE DISCHARGE | DRG: 193 | End: 2024-03-14
Attending: STUDENT IN AN ORGANIZED HEALTH CARE EDUCATION/TRAINING PROGRAM | Admitting: INTERNAL MEDICINE
Payer: COMMERCIAL

## 2024-03-08 ENCOUNTER — HOSPITAL ENCOUNTER (EMERGENCY)
Dept: CARDIOLOGY | Facility: HOSPITAL | Age: 59
Discharge: HOME | DRG: 193 | End: 2024-03-08
Payer: COMMERCIAL

## 2024-03-08 DIAGNOSIS — J18.9 PNEUMONIA, UNSPECIFIED ORGANISM: Primary | ICD-10-CM

## 2024-03-08 DIAGNOSIS — R78.81 BACTEREMIA ASSOCIATED WITH INTRAVASCULAR LINE, INITIAL ENCOUNTER (CMS-HCC): ICD-10-CM

## 2024-03-08 DIAGNOSIS — T82.7XXA BACTEREMIA ASSOCIATED WITH INTRAVASCULAR LINE, INITIAL ENCOUNTER (CMS-HCC): ICD-10-CM

## 2024-03-08 LAB
ALBUMIN SERPL BCP-MCNC: 2.8 G/DL (ref 3.4–5)
ALBUMIN SERPL BCP-MCNC: 3.2 G/DL (ref 3.4–5)
ALP SERPL-CCNC: 100 U/L (ref 33–110)
ALP SERPL-CCNC: 96 U/L (ref 33–110)
ALT SERPL W P-5'-P-CCNC: 27 U/L (ref 7–45)
ALT SERPL W P-5'-P-CCNC: 29 U/L (ref 7–45)
ANION GAP SERPL CALC-SCNC: 13 MMOL/L (ref 10–20)
ANION GAP SERPL CALC-SCNC: 9 MMOL/L (ref 10–20)
AST SERPL W P-5'-P-CCNC: 25 U/L (ref 9–39)
AST SERPL W P-5'-P-CCNC: 31 U/L (ref 9–39)
BASOPHILS # BLD AUTO: 0.06 X10*3/UL (ref 0–0.1)
BASOPHILS NFR BLD AUTO: 0.4 %
BILIRUB SERPL-MCNC: 0.4 MG/DL (ref 0–1.2)
BILIRUB SERPL-MCNC: 0.6 MG/DL (ref 0–1.2)
BNP SERPL-MCNC: 128 PG/ML (ref 0–99)
BUN SERPL-MCNC: 5 MG/DL (ref 6–23)
BUN SERPL-MCNC: 8 MG/DL (ref 6–23)
CALCIUM SERPL-MCNC: 7.7 MG/DL (ref 8.6–10.3)
CALCIUM SERPL-MCNC: 8.7 MG/DL (ref 8.6–10.3)
CARDIAC TROPONIN I PNL SERPL HS: 11 NG/L (ref 0–13)
CARDIAC TROPONIN I PNL SERPL HS: 12 NG/L (ref 0–13)
CHLORIDE SERPL-SCNC: 101 MMOL/L (ref 98–107)
CHLORIDE SERPL-SCNC: 103 MMOL/L (ref 98–107)
CO2 SERPL-SCNC: 27 MMOL/L (ref 21–32)
CO2 SERPL-SCNC: 27 MMOL/L (ref 21–32)
CREAT SERPL-MCNC: 0.51 MG/DL (ref 0.5–1.05)
CREAT SERPL-MCNC: 0.53 MG/DL (ref 0.5–1.05)
EGFRCR SERPLBLD CKD-EPI 2021: >90 ML/MIN/1.73M*2
EGFRCR SERPLBLD CKD-EPI 2021: >90 ML/MIN/1.73M*2
EOSINOPHIL # BLD AUTO: 0.05 X10*3/UL (ref 0–0.7)
EOSINOPHIL NFR BLD AUTO: 0.3 %
ERYTHROCYTE [DISTWIDTH] IN BLOOD BY AUTOMATED COUNT: 15.3 % (ref 11.5–14.5)
FLUAV RNA RESP QL NAA+PROBE: NOT DETECTED
FLUBV RNA RESP QL NAA+PROBE: NOT DETECTED
GLUCOSE SERPL-MCNC: 119 MG/DL (ref 74–99)
GLUCOSE SERPL-MCNC: 122 MG/DL (ref 74–99)
HCT VFR BLD AUTO: 34.3 % (ref 36–46)
HGB BLD-MCNC: 11.2 G/DL (ref 12–16)
IMM GRANULOCYTES # BLD AUTO: 0.07 X10*3/UL (ref 0–0.7)
IMM GRANULOCYTES NFR BLD AUTO: 0.4 % (ref 0–0.9)
INR PPP: 1.6 (ref 0.9–1.1)
LACTATE SERPL-SCNC: 0.7 MMOL/L (ref 0.4–2)
LIPASE SERPL-CCNC: 6 U/L (ref 9–82)
LYMPHOCYTES # BLD AUTO: 1.31 X10*3/UL (ref 1.2–4.8)
LYMPHOCYTES NFR BLD AUTO: 8.3 %
MAGNESIUM SERPL-MCNC: 1.73 MG/DL (ref 1.6–2.4)
MCH RBC QN AUTO: 26 PG (ref 26–34)
MCHC RBC AUTO-ENTMCNC: 32.7 G/DL (ref 32–36)
MCV RBC AUTO: 80 FL (ref 80–100)
MONOCYTES # BLD AUTO: 1.63 X10*3/UL (ref 0.1–1)
MONOCYTES NFR BLD AUTO: 10.4 %
NEUTROPHILS # BLD AUTO: 12.61 X10*3/UL (ref 1.2–7.7)
NEUTROPHILS NFR BLD AUTO: 80.2 %
NRBC BLD-RTO: 0 /100 WBCS (ref 0–0)
PLATELET # BLD AUTO: 317 X10*3/UL (ref 150–450)
POTASSIUM SERPL-SCNC: 3 MMOL/L (ref 3.5–5.3)
POTASSIUM SERPL-SCNC: 3.2 MMOL/L (ref 3.5–5.3)
PROT SERPL-MCNC: 6.5 G/DL (ref 6.4–8.2)
PROT SERPL-MCNC: 7.5 G/DL (ref 6.4–8.2)
PROTHROMBIN TIME: 18.3 SECONDS (ref 9.8–12.8)
RBC # BLD AUTO: 4.31 X10*6/UL (ref 4–5.2)
SARS-COV-2 RNA RESP QL NAA+PROBE: NOT DETECTED
SODIUM SERPL-SCNC: 136 MMOL/L (ref 136–145)
SODIUM SERPL-SCNC: 138 MMOL/L (ref 136–145)
VANCOMYCIN SERPL-MCNC: 19.1 UG/ML (ref 5–20)
WBC # BLD AUTO: 15.7 X10*3/UL (ref 4.4–11.3)

## 2024-03-08 PROCEDURE — 71046 X-RAY EXAM CHEST 2 VIEWS: CPT

## 2024-03-08 PROCEDURE — 2500000004 HC RX 250 GENERAL PHARMACY W/ HCPCS (ALT 636 FOR OP/ED): Performed by: STUDENT IN AN ORGANIZED HEALTH CARE EDUCATION/TRAINING PROGRAM

## 2024-03-08 PROCEDURE — 83605 ASSAY OF LACTIC ACID: CPT | Performed by: INTERNAL MEDICINE

## 2024-03-08 PROCEDURE — 99285 EMERGENCY DEPT VISIT HI MDM: CPT | Mod: 25

## 2024-03-08 PROCEDURE — 2500000004 HC RX 250 GENERAL PHARMACY W/ HCPCS (ALT 636 FOR OP/ED): Performed by: INTERNAL MEDICINE

## 2024-03-08 PROCEDURE — 83690 ASSAY OF LIPASE: CPT | Performed by: STUDENT IN AN ORGANIZED HEALTH CARE EDUCATION/TRAINING PROGRAM

## 2024-03-08 PROCEDURE — 80053 COMPREHEN METABOLIC PANEL: CPT | Performed by: STUDENT IN AN ORGANIZED HEALTH CARE EDUCATION/TRAINING PROGRAM

## 2024-03-08 PROCEDURE — 80053 COMPREHEN METABOLIC PANEL: CPT | Performed by: INTERNAL MEDICINE

## 2024-03-08 PROCEDURE — 1200000002 HC GENERAL ROOM WITH TELEMETRY DAILY

## 2024-03-08 PROCEDURE — 84484 ASSAY OF TROPONIN QUANT: CPT | Performed by: STUDENT IN AN ORGANIZED HEALTH CARE EDUCATION/TRAINING PROGRAM

## 2024-03-08 PROCEDURE — 93005 ELECTROCARDIOGRAM TRACING: CPT

## 2024-03-08 PROCEDURE — 2500000001 HC RX 250 WO HCPCS SELF ADMINISTERED DRUGS (ALT 637 FOR MEDICARE OP): Performed by: STUDENT IN AN ORGANIZED HEALTH CARE EDUCATION/TRAINING PROGRAM

## 2024-03-08 PROCEDURE — 84145 PROCALCITONIN (PCT): CPT | Mod: ELYLAB | Performed by: INTERNAL MEDICINE

## 2024-03-08 PROCEDURE — 85610 PROTHROMBIN TIME: CPT | Performed by: STUDENT IN AN ORGANIZED HEALTH CARE EDUCATION/TRAINING PROGRAM

## 2024-03-08 PROCEDURE — 80202 ASSAY OF VANCOMYCIN: CPT

## 2024-03-08 PROCEDURE — 96365 THER/PROPH/DIAG IV INF INIT: CPT

## 2024-03-08 PROCEDURE — 96375 TX/PRO/DX INJ NEW DRUG ADDON: CPT

## 2024-03-08 PROCEDURE — 71275 CT ANGIOGRAPHY CHEST: CPT | Performed by: RADIOLOGY

## 2024-03-08 PROCEDURE — 36415 COLL VENOUS BLD VENIPUNCTURE: CPT | Performed by: STUDENT IN AN ORGANIZED HEALTH CARE EDUCATION/TRAINING PROGRAM

## 2024-03-08 PROCEDURE — 85025 COMPLETE CBC W/AUTO DIFF WBC: CPT | Performed by: STUDENT IN AN ORGANIZED HEALTH CARE EDUCATION/TRAINING PROGRAM

## 2024-03-08 PROCEDURE — 2550000001 HC RX 255 CONTRASTS: Performed by: STUDENT IN AN ORGANIZED HEALTH CARE EDUCATION/TRAINING PROGRAM

## 2024-03-08 PROCEDURE — 2500000001 HC RX 250 WO HCPCS SELF ADMINISTERED DRUGS (ALT 637 FOR MEDICARE OP): Performed by: INTERNAL MEDICINE

## 2024-03-08 PROCEDURE — 87040 BLOOD CULTURE FOR BACTERIA: CPT | Mod: ELYLAB | Performed by: STUDENT IN AN ORGANIZED HEALTH CARE EDUCATION/TRAINING PROGRAM

## 2024-03-08 PROCEDURE — 71046 X-RAY EXAM CHEST 2 VIEWS: CPT | Performed by: RADIOLOGY

## 2024-03-08 PROCEDURE — 87636 SARSCOV2 & INF A&B AMP PRB: CPT | Performed by: STUDENT IN AN ORGANIZED HEALTH CARE EDUCATION/TRAINING PROGRAM

## 2024-03-08 PROCEDURE — 83735 ASSAY OF MAGNESIUM: CPT | Performed by: STUDENT IN AN ORGANIZED HEALTH CARE EDUCATION/TRAINING PROGRAM

## 2024-03-08 PROCEDURE — 83880 ASSAY OF NATRIURETIC PEPTIDE: CPT | Performed by: STUDENT IN AN ORGANIZED HEALTH CARE EDUCATION/TRAINING PROGRAM

## 2024-03-08 PROCEDURE — 99223 1ST HOSP IP/OBS HIGH 75: CPT | Performed by: INTERNAL MEDICINE

## 2024-03-08 PROCEDURE — 71275 CT ANGIOGRAPHY CHEST: CPT

## 2024-03-08 RX ORDER — ENOXAPARIN SODIUM 100 MG/ML
40 INJECTION SUBCUTANEOUS EVERY 24 HOURS
Status: DISCONTINUED | OUTPATIENT
Start: 2024-03-08 | End: 2024-03-11

## 2024-03-08 RX ORDER — DEXTROSE 50 % IN WATER (D50W) INTRAVENOUS SYRINGE
25
Status: DISCONTINUED | OUTPATIENT
Start: 2024-03-08 | End: 2024-03-14 | Stop reason: HOSPADM

## 2024-03-08 RX ORDER — VANCOMYCIN HYDROCHLORIDE 1 G/20ML
INJECTION, POWDER, LYOPHILIZED, FOR SOLUTION INTRAVENOUS DAILY PRN
Status: DISCONTINUED | OUTPATIENT
Start: 2024-03-08 | End: 2024-03-11

## 2024-03-08 RX ORDER — HYDROCODONE BITARTRATE AND ACETAMINOPHEN 5; 325 MG/1; MG/1
1 TABLET ORAL EVERY 6 HOURS PRN
Status: DISCONTINUED | OUTPATIENT
Start: 2024-03-08 | End: 2024-03-14 | Stop reason: HOSPADM

## 2024-03-08 RX ORDER — ACETAMINOPHEN 325 MG/1
650 TABLET ORAL EVERY 6 HOURS PRN
Status: DISCONTINUED | OUTPATIENT
Start: 2024-03-08 | End: 2024-03-14 | Stop reason: HOSPADM

## 2024-03-08 RX ORDER — AZITHROMYCIN 250 MG/1
500 TABLET, FILM COATED ORAL
Status: DISCONTINUED | OUTPATIENT
Start: 2024-03-09 | End: 2024-03-13

## 2024-03-08 RX ORDER — INSULIN LISPRO 100 [IU]/ML
0-5 INJECTION, SOLUTION INTRAVENOUS; SUBCUTANEOUS
Status: DISCONTINUED | OUTPATIENT
Start: 2024-03-09 | End: 2024-03-14 | Stop reason: HOSPADM

## 2024-03-08 RX ORDER — OXYCODONE AND ACETAMINOPHEN 5; 325 MG/1; MG/1
1 TABLET ORAL ONCE
Status: COMPLETED | OUTPATIENT
Start: 2024-03-08 | End: 2024-03-08

## 2024-03-08 RX ORDER — DEXTROSE MONOHYDRATE 100 MG/ML
0.3 INJECTION, SOLUTION INTRAVENOUS ONCE AS NEEDED
Status: DISCONTINUED | OUTPATIENT
Start: 2024-03-08 | End: 2024-03-14 | Stop reason: HOSPADM

## 2024-03-08 RX ORDER — POTASSIUM CHLORIDE 14.9 MG/ML
20 INJECTION INTRAVENOUS
Status: DISPENSED | OUTPATIENT
Start: 2024-03-08 | End: 2024-03-08

## 2024-03-08 RX ORDER — AMLODIPINE BESYLATE 5 MG/1
10 TABLET ORAL DAILY
Status: DISCONTINUED | OUTPATIENT
Start: 2024-03-09 | End: 2024-03-14 | Stop reason: HOSPADM

## 2024-03-08 RX ORDER — POLYETHYLENE GLYCOL 3350 17 G/17G
17 POWDER, FOR SOLUTION ORAL DAILY
Status: DISCONTINUED | OUTPATIENT
Start: 2024-03-08 | End: 2024-03-14 | Stop reason: HOSPADM

## 2024-03-08 RX ADMIN — Medication 4.5 G: at 15:15

## 2024-03-08 RX ADMIN — ENOXAPARIN SODIUM 40 MG: 40 INJECTION SUBCUTANEOUS at 19:48

## 2024-03-08 RX ADMIN — SODIUM CHLORIDE 1000 ML: 9 INJECTION, SOLUTION INTRAVENOUS at 16:19

## 2024-03-08 RX ADMIN — HYDROCODONE BITARTRATE AND ACETAMINOPHEN 1 TABLET: 5; 325 TABLET ORAL at 19:47

## 2024-03-08 RX ADMIN — AZITHROMYCIN MONOHYDRATE 500 MG: 500 INJECTION, POWDER, LYOPHILIZED, FOR SOLUTION INTRAVENOUS at 16:54

## 2024-03-08 RX ADMIN — PIPERACILLIN SODIUM AND TAZOBACTAM SODIUM 3.38 G: 3; .375 INJECTION, SOLUTION INTRAVENOUS at 21:24

## 2024-03-08 RX ADMIN — VANCOMYCIN HYDROCHLORIDE 2 G: 10 INJECTION, POWDER, LYOPHILIZED, FOR SOLUTION INTRAVENOUS at 16:55

## 2024-03-08 RX ADMIN — SODIUM CHLORIDE 10 ML: 9 INJECTION, SOLUTION INTRAVENOUS at 21:24

## 2024-03-08 RX ADMIN — OXYCODONE HYDROCHLORIDE AND ACETAMINOPHEN 1 TABLET: 5; 325 TABLET ORAL at 15:23

## 2024-03-08 RX ADMIN — IOHEXOL 75 ML: 350 INJECTION, SOLUTION INTRAVENOUS at 16:19

## 2024-03-08 RX ADMIN — ACETAMINOPHEN 650 MG: 325 TABLET ORAL at 21:46

## 2024-03-08 RX ADMIN — POTASSIUM CHLORIDE 20 MEQ: 14.9 INJECTION, SOLUTION INTRAVENOUS at 18:06

## 2024-03-08 SDOH — SOCIAL STABILITY: SOCIAL INSECURITY: DO YOU FEEL ANYONE HAS EXPLOITED OR TAKEN ADVANTAGE OF YOU FINANCIALLY OR OF YOUR PERSONAL PROPERTY?: NO

## 2024-03-08 SDOH — SOCIAL STABILITY: SOCIAL INSECURITY: ARE YOU OR HAVE YOU BEEN THREATENED OR ABUSED PHYSICALLY, EMOTIONALLY, OR SEXUALLY BY ANYONE?: NO

## 2024-03-08 SDOH — SOCIAL STABILITY: SOCIAL INSECURITY: HAS ANYONE EVER THREATENED TO HURT YOUR FAMILY OR YOUR PETS?: NO

## 2024-03-08 SDOH — SOCIAL STABILITY: SOCIAL INSECURITY: DO YOU FEEL UNSAFE GOING BACK TO THE PLACE WHERE YOU ARE LIVING?: NO

## 2024-03-08 SDOH — SOCIAL STABILITY: SOCIAL INSECURITY: ABUSE: ADULT

## 2024-03-08 SDOH — SOCIAL STABILITY: SOCIAL INSECURITY: DOES ANYONE TRY TO KEEP YOU FROM HAVING/CONTACTING OTHER FRIENDS OR DOING THINGS OUTSIDE YOUR HOME?: NO

## 2024-03-08 SDOH — SOCIAL STABILITY: SOCIAL INSECURITY: WERE YOU ABLE TO COMPLETE ALL THE BEHAVIORAL HEALTH SCREENINGS?: YES

## 2024-03-08 SDOH — SOCIAL STABILITY: SOCIAL INSECURITY: HAVE YOU HAD THOUGHTS OF HARMING ANYONE ELSE?: NO

## 2024-03-08 SDOH — SOCIAL STABILITY: SOCIAL INSECURITY: ARE THERE ANY APPARENT SIGNS OF INJURIES/BEHAVIORS THAT COULD BE RELATED TO ABUSE/NEGLECT?: NO

## 2024-03-08 ASSESSMENT — PAIN SCALES - GENERAL
PAINLEVEL_OUTOF10: 8
PAINLEVEL_OUTOF10: 10 - WORST POSSIBLE PAIN
PAINLEVEL_OUTOF10: 3

## 2024-03-08 ASSESSMENT — PAIN DESCRIPTION - ORIENTATION
ORIENTATION: LEFT
ORIENTATION: LEFT

## 2024-03-08 ASSESSMENT — ACTIVITIES OF DAILY LIVING (ADL)
BATHING: NEEDS ASSISTANCE
GROOMING: INDEPENDENT
HEARING - LEFT EAR: FUNCTIONAL
JUDGMENT_ADEQUATE_SAFELY_COMPLETE_DAILY_ACTIVITIES: YES
PATIENT'S MEMORY ADEQUATE TO SAFELY COMPLETE DAILY ACTIVITIES?: YES
ADEQUATE_TO_COMPLETE_ADL: YES
DRESSING YOURSELF: NEEDS ASSISTANCE
WALKS IN HOME: INDEPENDENT
FEEDING YOURSELF: INDEPENDENT
LACK_OF_TRANSPORTATION: NO
HEARING - RIGHT EAR: FUNCTIONAL
TOILETING: INDEPENDENT

## 2024-03-08 ASSESSMENT — LIFESTYLE VARIABLES
EVER FELT BAD OR GUILTY ABOUT YOUR DRINKING: NO
HAVE PEOPLE ANNOYED YOU BY CRITICIZING YOUR DRINKING: NO
HOW OFTEN DO YOU HAVE 6 OR MORE DRINKS ON ONE OCCASION: NEVER
HAVE YOU EVER FELT YOU SHOULD CUT DOWN ON YOUR DRINKING: NO
AUDIT-C TOTAL SCORE: 0
EVER HAD A DRINK FIRST THING IN THE MORNING TO STEADY YOUR NERVES TO GET RID OF A HANGOVER: NO
AUDIT-C TOTAL SCORE: 0
SKIP TO QUESTIONS 9-10: 1
HOW OFTEN DO YOU HAVE A DRINK CONTAINING ALCOHOL: NEVER
HOW MANY STANDARD DRINKS CONTAINING ALCOHOL DO YOU HAVE ON A TYPICAL DAY: PATIENT DOES NOT DRINK

## 2024-03-08 ASSESSMENT — ENCOUNTER SYMPTOMS
MUSCULOSKELETAL NEGATIVE: 1
SHORTNESS OF BREATH: 1
ALLERGIC/IMMUNOLOGIC NEGATIVE: 1
ENDOCRINE NEGATIVE: 1
GASTROINTESTINAL NEGATIVE: 1
EYES NEGATIVE: 1
PSYCHIATRIC NEGATIVE: 1
FATIGUE: 1

## 2024-03-08 ASSESSMENT — COGNITIVE AND FUNCTIONAL STATUS - GENERAL
DRESSING REGULAR LOWER BODY CLOTHING: A LITTLE
TOILETING: A LITTLE
MOVING TO AND FROM BED TO CHAIR: A LITTLE
DRESSING REGULAR UPPER BODY CLOTHING: A LITTLE
STANDING UP FROM CHAIR USING ARMS: A LITTLE
PATIENT BASELINE BEDBOUND: NO
DAILY ACTIVITIY SCORE: 20
MOBILITY SCORE: 19
HELP NEEDED FOR BATHING: A LITTLE
WALKING IN HOSPITAL ROOM: A LITTLE
TURNING FROM BACK TO SIDE WHILE IN FLAT BAD: A LITTLE
CLIMB 3 TO 5 STEPS WITH RAILING: A LITTLE

## 2024-03-08 ASSESSMENT — PATIENT HEALTH QUESTIONNAIRE - PHQ9
1. LITTLE INTEREST OR PLEASURE IN DOING THINGS: NOT AT ALL
SUM OF ALL RESPONSES TO PHQ9 QUESTIONS 1 & 2: 0
2. FEELING DOWN, DEPRESSED OR HOPELESS: NOT AT ALL

## 2024-03-08 ASSESSMENT — PAIN - FUNCTIONAL ASSESSMENT
PAIN_FUNCTIONAL_ASSESSMENT: 0-10

## 2024-03-08 ASSESSMENT — PAIN DESCRIPTION - LOCATION
LOCATION: BREAST
LOCATION: CHEST

## 2024-03-08 ASSESSMENT — PAIN DESCRIPTION - PAIN TYPE: TYPE: ACUTE PAIN

## 2024-03-08 ASSESSMENT — COLUMBIA-SUICIDE SEVERITY RATING SCALE - C-SSRS
1. IN THE PAST MONTH, HAVE YOU WISHED YOU WERE DEAD OR WISHED YOU COULD GO TO SLEEP AND NOT WAKE UP?: NO
6. HAVE YOU EVER DONE ANYTHING, STARTED TO DO ANYTHING, OR PREPARED TO DO ANYTHING TO END YOUR LIFE?: NO
2. HAVE YOU ACTUALLY HAD ANY THOUGHTS OF KILLING YOURSELF?: NO

## 2024-03-08 ASSESSMENT — PAIN DESCRIPTION - DESCRIPTORS: DESCRIPTORS: ACHING

## 2024-03-08 NOTE — ED PROVIDER NOTES
HPI   Chief Complaint   Patient presents with    Chest Pain     Gradually worsening over the past few days. Left sided.        Patient is a 58-year-old female presenting to the emergency department with complaints of left-sided chest pain that started approximately 10 PM last evening while resting at home.  Patient describes it as a sharp left-sided feeling worse with deep respirations associated with a nonproductive cough.  Patient does endorse subjective fevers.  Patient denies any sick contacts.  Patient does have a history of lung cancer not currently on any chemotherapy and states she is in remission.  Patient denies any other pain or complaints.      History provided by:  Patient   used: No                        Badin Coma Scale Score: 15                     Patient History   No past medical history on file.  No past surgical history on file.  No family history on file.  Social History     Tobacco Use    Smoking status: Not on file    Smokeless tobacco: Not on file   Substance Use Topics    Alcohol use: Not on file    Drug use: Not on file       Physical Exam   ED Triage Vitals [03/08/24 1332]   Temperature Heart Rate Respirations BP   37.8 °C (100 °F) 90 (!) 22 112/68      Pulse Ox Temp Source Heart Rate Source Patient Position   98 % Temporal Monitor --      BP Location FiO2 (%)     -- --       Physical Exam  Vitals and nursing note reviewed.   Constitutional:       General: She is not in acute distress.     Appearance: Normal appearance. She is not ill-appearing, toxic-appearing or diaphoretic.   HENT:      Head: Normocephalic and atraumatic.      Nose: Nose normal.      Mouth/Throat:      Mouth: Mucous membranes are dry.      Pharynx: No oropharyngeal exudate or posterior oropharyngeal erythema.   Eyes:      General: No scleral icterus.     Extraocular Movements: Extraocular movements intact.      Pupils: Pupils are equal, round, and reactive to light.   Cardiovascular:      Rate and  Rhythm: Normal rate and regular rhythm.      Pulses: Normal pulses.      Heart sounds: Normal heart sounds. No murmur heard.     No friction rub. No gallop.   Pulmonary:      Effort: Pulmonary effort is normal. No respiratory distress.      Breath sounds: No stridor. Examination of the left-upper field reveals decreased breath sounds and rales. Examination of the left-middle field reveals decreased breath sounds and rales. Examination of the left-lower field reveals decreased breath sounds. Decreased breath sounds and rales present. No wheezing or rhonchi.   Chest:      Chest wall: No tenderness.   Abdominal:      General: Abdomen is flat. There is no distension.      Palpations: Abdomen is soft. There is no mass.      Tenderness: There is no abdominal tenderness. There is no guarding.      Hernia: No hernia is present.   Musculoskeletal:         General: No swelling, tenderness, deformity or signs of injury. Normal range of motion.      Cervical back: Normal range of motion and neck supple. No rigidity.   Skin:     General: Skin is warm and dry.      Capillary Refill: Capillary refill takes less than 2 seconds.      Coloration: Skin is not jaundiced or pale.      Findings: No bruising, erythema, lesion or rash.   Neurological:      General: No focal deficit present.      Mental Status: She is alert and oriented to person, place, and time. Mental status is at baseline.   Psychiatric:         Mood and Affect: Mood normal.         Behavior: Behavior normal.         ED Course & MDM   Diagnoses as of 03/08/24 2140   Pneumonia, unspecified organism       Medical Decision Making  Patient a 58-year-old female presenting to the emergency department with complaints of left-sided pleuritic chest pain that started at 10 PM yesterday with subjective fevers.  Cardiac evaluation was ordered along with blood cultures for potential infection.    Chest x-ray with near complete opacification of left hemithorax concerning for  infectious etiology versus underlying mass and neoplasm or mucous plugging.  Antibiotics were ordered and CT PE study was ordered.  Lactate level was normal.  Troponin negative x 2.  EKG without ischemic patterns.  COVID and influenza were negative.  CBC with leukocytosis of 15.7-year-old.  Metabolic panel notable for hypokalemia and replacement was ordered.  Magnesium levels normal.  CT PE study was negative for pulmonary embolism with bilateral left-sided pleural effusion as well as consolidation and bronchiectasis.  Given these findings recommended admission for antibiotics further evaluation and pulmonology consult.  Hospital service was contacted and case discussed and the patient was accepted for admission.  Patient remained stable while in the emergency department.    Amount and/or Complexity of Data Reviewed  Labs: ordered. Decision-making details documented in ED Course.  Radiology: ordered. Decision-making details documented in ED Course.  ECG/medicine tests: independent interpretation performed.     Details: Sinus rhythm with a ventricular rate of 92 bpm.  QRS interval 92 ms.  QTc 455 ms.  No acute ischemic injury pattern noted.      Labs Reviewed   CBC WITH AUTO DIFFERENTIAL - Abnormal       Result Value    WBC 15.7 (*)     nRBC 0.0      RBC 4.31      Hemoglobin 11.2 (*)     Hematocrit 34.3 (*)     MCV 80      MCH 26.0      MCHC 32.7      RDW 15.3 (*)     Platelets 317      Neutrophils % 80.2      Immature Granulocytes %, Automated 0.4      Lymphocytes % 8.3      Monocytes % 10.4      Eosinophils % 0.3      Basophils % 0.4      Neutrophils Absolute 12.61 (*)     Immature Granulocytes Absolute, Automated 0.07      Lymphocytes Absolute 1.31      Monocytes Absolute 1.63 (*)     Eosinophils Absolute 0.05      Basophils Absolute 0.06     PROTIME-INR - Abnormal    Protime 18.3 (*)     INR 1.6 (*)    BLOOD CULTURE   BLOOD CULTURE   TROPONIN SERIES- (INITIAL, 1 HR)    Narrative:     The following orders were  created for panel order Troponin I Series, High Sensitivity (0, 1 HR).  Procedure                               Abnormality         Status                     ---------                               -----------         ------                     Troponin I, High Sensiti...[[id number]]                      In process                 Troponin, High Sensitivi...[[id number]]                                                   Please view results for these tests on the individual orders.   COMPREHENSIVE METABOLIC PANEL   MAGNESIUM   LIPASE   B-TYPE NATRIURETIC PEPTIDE   SARS-COV-2 AND INFLUENZA A/B PCR   URINALYSIS WITH REFLEX CULTURE AND MICROSCOPIC    Narrative:     The following orders were created for panel order Urinalysis with Reflex Culture and Microscopic.  Procedure                               Abnormality         Status                     ---------                               -----------         ------                     Urinalysis with Reflex C...[[id number]]                                                 Extra Urine Gray Tube[[id number]]                                                         Please view results for these tests on the individual orders.   SERIAL TROPONIN-INITIAL   URINALYSIS WITH REFLEX CULTURE AND MICROSCOPIC   EXTRA URINE GRAY TUBE   SERIAL TROPONIN, 1 HOUR     XR chest 2 views   Final Result   1.  Near-complete opacification of the left hemithorax with volume   loss as described.        Signed by: Sixto Sinha 3/8/2024 2:25 PM   Dictation workstation:   [id number]            Procedure  Procedures     Nader Fitzgerald DO  03/08/24 2433

## 2024-03-08 NOTE — H&P
History Of Present Illness  aRdha Mcmahon is a 58 y.o. 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  But the patient chest pain is more pleuritic chest pain.  She otherwise denies any dizziness lightheadedness being sweaty or clammy.  Patient states she has oxygen at home but does not wear oxygen normally.  She also reports some subjective fever.  CTA of the chest was done that did not show any evidence of pulmonary embolism but also showed left-sided volume loss with leftward mediastinal shift and sizable left pleural effusion.  Underlying infectious etiology is not excluded.  Also shows right middle lobe poor attachment on the right side that was not present previously that may be a focus of infection.  Troponins were within normal limits EKG without any ST changes.  Patient was started on broad-spectrum antibiotics and admitted for further care and management.       Past Medical History  No past medical history on file.    Surgical History  No past surgical history on file.     Social History  She has no history on file for tobacco use, alcohol use, and drug use.    Family History  No family history on file.     Allergies  Morphine    Review of Systems   Constitutional:  Positive for fatigue.   HENT: Negative.     Eyes: Negative.    Respiratory:  Positive for shortness of breath.    Cardiovascular:  Positive for chest pain.   Gastrointestinal: Negative.    Endocrine: Negative.    Musculoskeletal: Negative.    Skin: Negative.   "  Allergic/Immunologic: Negative.    Psychiatric/Behavioral: Negative.     All other systems reviewed and are negative.       Physical Exam  Constitutional:       Appearance: She is obese.   HENT:      Head: Normocephalic and atraumatic.      Nose: Nose normal.      Mouth/Throat:      Mouth: Mucous membranes are dry.   Eyes:      Extraocular Movements: Extraocular movements intact.      Conjunctiva/sclera: Conjunctivae normal.   Cardiovascular:      Rate and Rhythm: Regular rhythm. Tachycardia present.      Pulses: Normal pulses.      Heart sounds: Normal heart sounds.   Pulmonary:      Effort: Pulmonary effort is normal.      Breath sounds: Rhonchi and rales present.      Comments: Diminished on the left with rales.  Abdominal:      General: Bowel sounds are normal.      Palpations: Abdomen is soft.   Musculoskeletal:         General: Normal range of motion.      Cervical back: Normal range of motion and neck supple.   Skin:     General: Skin is warm and dry.   Neurological:      General: No focal deficit present.      Mental Status: She is alert and oriented to person, place, and time. Mental status is at baseline.   Psychiatric:         Mood and Affect: Mood normal.          Last Recorded Vitals  Blood pressure 121/70, pulse 89, temperature 37.8 °C (100 °F), temperature source Temporal, resp. rate 18, height 1.549 m (5' 1\"), weight 79.4 kg (175 lb), SpO2 96 %.    Relevant Results        Radha Mcmahon is a 58 y.o. 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  Assessment/Plan   Principal Problem:    Pneumonia, unspecified organism      Pneumonia  Sepsis present on admission  -CTA of the chest was done that did not show any evidence of pulmonary embolism but also showed left-sided volume loss with leftward mediastinal shift and sizable left pleural effusion.  Underlying infectious etiology is not excluded.  Also shows right middle lobe poor attachment on the right side that was not present previously that may be a focus of infection  -The patient was started on vancomycin Zosyn and azithromycin.  Will continue  -Patient with leukocytosis   -Check blood cultures and sputum cultures MRSA screen.  -Check strep and Legionella urinary antigens  -Check procalcitonin  -Pulmonology has been consulted regarding sizable pleural effusion and possible bronchoscopy    Chest pain  -Seems to be pleuritic in nature will continue with serial troponins and serial EKGs  -Echo from 2019 showed preserved ventricle ejection fraction with mild concentric left ventricular hypertrophy    COPD  SCC Lung cancer status post chemo and radiation with posttreatment findings on the left lung  -Not in COPD exacerbation at this time continue home medications once verified  -Continue follow-up at [facility] regarding lung cancer in outpatient settings    Hypokalemia  -Replace as indicated    Diabetes mellitus type 2  -Continue with sliding scale insulin ACHS Accu-Cheks and check A1c    Hypertension  -Continue with home amlodipine and continue with rest of antihypertensives as appropriate once verified    VTE prophylaxis-Lovenox           I spent 45 minutes in the professional and overall care of this patient.      Brenda Carlson MD

## 2024-03-09 LAB
ALBUMIN SERPL BCP-MCNC: 2.8 G/DL (ref 3.4–5)
ALP SERPL-CCNC: 127 U/L (ref 33–110)
ALT SERPL W P-5'-P-CCNC: 32 U/L (ref 7–45)
ANION GAP SERPL CALC-SCNC: 12 MMOL/L (ref 10–20)
APPEARANCE UR: ABNORMAL
AST SERPL W P-5'-P-CCNC: 35 U/L (ref 9–39)
BILIRUB SERPL-MCNC: 0.8 MG/DL (ref 0–1.2)
BILIRUB UR STRIP.AUTO-MCNC: NEGATIVE MG/DL
BUN SERPL-MCNC: 6 MG/DL (ref 6–23)
CALCIUM SERPL-MCNC: 8.1 MG/DL (ref 8.6–10.3)
CHLORIDE SERPL-SCNC: 102 MMOL/L (ref 98–107)
CO2 SERPL-SCNC: 26 MMOL/L (ref 21–32)
COLOR UR: YELLOW
CREAT SERPL-MCNC: 0.54 MG/DL (ref 0.5–1.05)
EGFRCR SERPLBLD CKD-EPI 2021: >90 ML/MIN/1.73M*2
ERYTHROCYTE [DISTWIDTH] IN BLOOD BY AUTOMATED COUNT: 15.8 % (ref 11.5–14.5)
GLUCOSE BLD MANUAL STRIP-MCNC: 124 MG/DL (ref 74–99)
GLUCOSE BLD MANUAL STRIP-MCNC: 140 MG/DL (ref 74–99)
GLUCOSE BLD MANUAL STRIP-MCNC: 154 MG/DL (ref 74–99)
GLUCOSE SERPL-MCNC: 98 MG/DL (ref 74–99)
GLUCOSE UR STRIP.AUTO-MCNC: NEGATIVE MG/DL
HCT VFR BLD AUTO: 31.6 % (ref 36–46)
HGB BLD-MCNC: 10.1 G/DL (ref 12–16)
HOLD SPECIMEN: NORMAL
KETONES UR STRIP.AUTO-MCNC: NEGATIVE MG/DL
LEUKOCYTE ESTERASE UR QL STRIP.AUTO: NEGATIVE
MCH RBC QN AUTO: 26 PG (ref 26–34)
MCHC RBC AUTO-ENTMCNC: 32 G/DL (ref 32–36)
MCV RBC AUTO: 81 FL (ref 80–100)
MRSA DNA SPEC QL NAA+PROBE: NOT DETECTED
MUCOUS THREADS #/AREA URNS AUTO: NORMAL /LPF
NITRITE UR QL STRIP.AUTO: NEGATIVE
NRBC BLD-RTO: 0 /100 WBCS (ref 0–0)
PH UR STRIP.AUTO: 5 [PH]
PLATELET # BLD AUTO: 300 X10*3/UL (ref 150–450)
POTASSIUM SERPL-SCNC: 3.4 MMOL/L (ref 3.5–5.3)
PROCALCITONIN SERPL-MCNC: 0.1 NG/ML
PROT SERPL-MCNC: 6.5 G/DL (ref 6.4–8.2)
PROT UR STRIP.AUTO-MCNC: NEGATIVE MG/DL
RBC # BLD AUTO: 3.88 X10*6/UL (ref 4–5.2)
RBC # UR STRIP.AUTO: ABNORMAL /UL
RBC #/AREA URNS AUTO: NORMAL /HPF
SODIUM SERPL-SCNC: 137 MMOL/L (ref 136–145)
SP GR UR STRIP.AUTO: 1.01
SQUAMOUS #/AREA URNS AUTO: NORMAL /HPF
UROBILINOGEN UR STRIP.AUTO-MCNC: 4 MG/DL
VANCOMYCIN SERPL-MCNC: 20.5 UG/ML (ref 5–20)
WBC # BLD AUTO: 22.1 X10*3/UL (ref 4.4–11.3)
WBC #/AREA URNS AUTO: NORMAL /HPF

## 2024-03-09 PROCEDURE — 87641 MR-STAPH DNA AMP PROBE: CPT | Performed by: INTERNAL MEDICINE

## 2024-03-09 PROCEDURE — 2500000004 HC RX 250 GENERAL PHARMACY W/ HCPCS (ALT 636 FOR OP/ED): Mod: JZ

## 2024-03-09 PROCEDURE — 99232 SBSQ HOSP IP/OBS MODERATE 35: CPT | Performed by: STUDENT IN AN ORGANIZED HEALTH CARE EDUCATION/TRAINING PROGRAM

## 2024-03-09 PROCEDURE — 2500000004 HC RX 250 GENERAL PHARMACY W/ HCPCS (ALT 636 FOR OP/ED): Performed by: INTERNAL MEDICINE

## 2024-03-09 PROCEDURE — 2500000002 HC RX 250 W HCPCS SELF ADMINISTERED DRUGS (ALT 637 FOR MEDICARE OP, ALT 636 FOR OP/ED): Performed by: INTERNAL MEDICINE

## 2024-03-09 PROCEDURE — 2500000001 HC RX 250 WO HCPCS SELF ADMINISTERED DRUGS (ALT 637 FOR MEDICARE OP): Performed by: INTERNAL MEDICINE

## 2024-03-09 PROCEDURE — 87899 AGENT NOS ASSAY W/OPTIC: CPT | Mod: ELYLAB | Performed by: INTERNAL MEDICINE

## 2024-03-09 PROCEDURE — 85027 COMPLETE CBC AUTOMATED: CPT | Performed by: INTERNAL MEDICINE

## 2024-03-09 PROCEDURE — 80202 ASSAY OF VANCOMYCIN: CPT

## 2024-03-09 PROCEDURE — 87449 NOS EACH ORGANISM AG IA: CPT | Mod: ELYLAB | Performed by: INTERNAL MEDICINE

## 2024-03-09 PROCEDURE — 87081 CULTURE SCREEN ONLY: CPT | Mod: ELYLAB | Performed by: INTERNAL MEDICINE

## 2024-03-09 PROCEDURE — 82947 ASSAY GLUCOSE BLOOD QUANT: CPT

## 2024-03-09 PROCEDURE — 1200000002 HC GENERAL ROOM WITH TELEMETRY DAILY

## 2024-03-09 PROCEDURE — 81001 URINALYSIS AUTO W/SCOPE: CPT | Performed by: STUDENT IN AN ORGANIZED HEALTH CARE EDUCATION/TRAINING PROGRAM

## 2024-03-09 PROCEDURE — 80053 COMPREHEN METABOLIC PANEL: CPT | Performed by: INTERNAL MEDICINE

## 2024-03-09 RX ORDER — MAGNESIUM SULFATE HEPTAHYDRATE 40 MG/ML
2 INJECTION, SOLUTION INTRAVENOUS ONCE
Status: COMPLETED | OUTPATIENT
Start: 2024-03-09 | End: 2024-03-09

## 2024-03-09 RX ORDER — POTASSIUM CHLORIDE 14.9 MG/ML
20 INJECTION INTRAVENOUS ONCE
Status: COMPLETED | OUTPATIENT
Start: 2024-03-09 | End: 2024-03-09

## 2024-03-09 RX ORDER — POTASSIUM CHLORIDE 1.5 G/1.58G
20 POWDER, FOR SOLUTION ORAL 2 TIMES DAILY
Status: DISCONTINUED | OUTPATIENT
Start: 2024-03-09 | End: 2024-03-12

## 2024-03-09 RX ADMIN — HYDROCODONE BITARTRATE AND ACETAMINOPHEN 1 TABLET: 5; 325 TABLET ORAL at 10:00

## 2024-03-09 RX ADMIN — AMLODIPINE BESYLATE 10 MG: 5 TABLET ORAL at 09:53

## 2024-03-09 RX ADMIN — SODIUM CHLORIDE 10 ML: 9 INJECTION, SOLUTION INTRAVENOUS at 14:00

## 2024-03-09 RX ADMIN — VANCOMYCIN HYDROCHLORIDE 1250 MG: 1.25 INJECTION, POWDER, LYOPHILIZED, FOR SOLUTION INTRAVENOUS at 05:14

## 2024-03-09 RX ADMIN — SODIUM CHLORIDE 10 ML: 9 INJECTION, SOLUTION INTRAVENOUS at 22:39

## 2024-03-09 RX ADMIN — SODIUM CHLORIDE 10 ML: 9 INJECTION, SOLUTION INTRAVENOUS at 06:43

## 2024-03-09 RX ADMIN — POTASSIUM CHLORIDE 20 MEQ: 1.5 POWDER, FOR SOLUTION ORAL at 01:00

## 2024-03-09 RX ADMIN — HYDROCODONE BITARTRATE AND ACETAMINOPHEN 1 TABLET: 5; 325 TABLET ORAL at 16:07

## 2024-03-09 RX ADMIN — PIPERACILLIN SODIUM AND TAZOBACTAM SODIUM 3.38 G: 3; .375 INJECTION, SOLUTION INTRAVENOUS at 06:43

## 2024-03-09 RX ADMIN — AZITHROMYCIN 500 MG: 250 TABLET, FILM COATED ORAL at 09:54

## 2024-03-09 RX ADMIN — VANCOMYCIN HYDROCHLORIDE 1250 MG: 1.25 INJECTION, POWDER, LYOPHILIZED, FOR SOLUTION INTRAVENOUS at 16:54

## 2024-03-09 RX ADMIN — POTASSIUM CHLORIDE 20 MEQ: 14.9 INJECTION, SOLUTION INTRAVENOUS at 01:00

## 2024-03-09 RX ADMIN — ENOXAPARIN SODIUM 40 MG: 40 INJECTION SUBCUTANEOUS at 18:44

## 2024-03-09 RX ADMIN — PIPERACILLIN SODIUM AND TAZOBACTAM SODIUM 3.38 G: 3; .375 INJECTION, SOLUTION INTRAVENOUS at 22:39

## 2024-03-09 RX ADMIN — POTASSIUM CHLORIDE 20 MEQ: 1.5 POWDER, FOR SOLUTION ORAL at 20:18

## 2024-03-09 RX ADMIN — POTASSIUM CHLORIDE 20 MEQ: 1.5 POWDER, FOR SOLUTION ORAL at 09:54

## 2024-03-09 RX ADMIN — MAGNESIUM SULFATE HEPTAHYDRATE 2 G: 40 INJECTION, SOLUTION INTRAVENOUS at 03:13

## 2024-03-09 RX ADMIN — HYDROCODONE BITARTRATE AND ACETAMINOPHEN 1 TABLET: 5; 325 TABLET ORAL at 22:38

## 2024-03-09 RX ADMIN — PIPERACILLIN SODIUM AND TAZOBACTAM SODIUM 3.38 G: 3; .375 INJECTION, SOLUTION INTRAVENOUS at 14:00

## 2024-03-09 RX ADMIN — HYDROCODONE BITARTRATE AND ACETAMINOPHEN 1 TABLET: 5; 325 TABLET ORAL at 03:19

## 2024-03-09 ASSESSMENT — PAIN DESCRIPTION - ORIENTATION
ORIENTATION: LEFT

## 2024-03-09 ASSESSMENT — COGNITIVE AND FUNCTIONAL STATUS - GENERAL
MOBILITY SCORE: 20
STANDING UP FROM CHAIR USING ARMS: A LITTLE
DAILY ACTIVITIY SCORE: 23
MOVING TO AND FROM BED TO CHAIR: A LITTLE
CLIMB 3 TO 5 STEPS WITH RAILING: A LITTLE
MOBILITY SCORE: 20
DAILY ACTIVITIY SCORE: 23
STANDING UP FROM CHAIR USING ARMS: A LITTLE
CLIMB 3 TO 5 STEPS WITH RAILING: A LITTLE
WALKING IN HOSPITAL ROOM: A LITTLE
DRESSING REGULAR LOWER BODY CLOTHING: A LITTLE
DRESSING REGULAR LOWER BODY CLOTHING: A LITTLE
WALKING IN HOSPITAL ROOM: A LITTLE
MOVING TO AND FROM BED TO CHAIR: A LITTLE

## 2024-03-09 ASSESSMENT — PAIN SCALES - GENERAL
PAINLEVEL_OUTOF10: 8
PAINLEVEL_OUTOF10: 7
PAINLEVEL_OUTOF10: 3
PAINLEVEL_OUTOF10: 8
PAINLEVEL_OUTOF10: 8

## 2024-03-09 ASSESSMENT — PAIN - FUNCTIONAL ASSESSMENT
PAIN_FUNCTIONAL_ASSESSMENT: 0-10

## 2024-03-09 ASSESSMENT — PAIN DESCRIPTION - LOCATION
LOCATION: CHEST

## 2024-03-09 NOTE — CONSULTS
Reason For Consult  Pneumonia, COPD, endobronchial lesion and pleural effusion in this patient with known lung cancer.    History Of Present Illness  Radha Mcmahon is a 58 y.o. 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  But the patient chest pain is more pleuritic chest pain.  She otherwise denies any dizziness lightheadedness being sweaty or clammy.  Patient states she has oxygen at home but does not wear oxygen normally.  She also reports some subjective fever.  CTA of the chest was done that did not show any evidence of pulmonary embolism but also showed left-sided volume loss with leftward mediastinal shift and sizable left pleural effusion.  Underlying infectious etiology is not excluded.  Also shows right middle lobe poor attachment on the right side that was not present previously that may be a focus of infection.  Troponins were within normal limits EKG without any ST changes.  Patient was started on broad-spectrum antibiotics and admitted for further care and management.     I was asked to evaluate and follow from a pulmonary perspective by Dr. Carlson. Chart reviewed, patient examined and obtained additional history from patient.  Reviewed CT scan of the chest done on this admission as well as report of a CT done at [facility] recently.  Reviewed prior available records from epic system also.     Past Medical History  Medical History   No past medical history on file.        Surgical History  Surgical History   No past surgical  "history on file.        Social History  She has no history on file for tobacco use, alcohol use, and drug use.     Family History  Family History   No family history on file.        Allergies  Morphine     Review of Systems   Constitutional:  Positive for fatigue.   HENT: Negative.     Eyes: Negative.    Respiratory:  Positive for shortness of breath.    Cardiovascular:  Positive for chest pain.   Gastrointestinal: Negative.    Endocrine: Negative.    Musculoskeletal: Negative.    Skin: Negative.    Allergic/Immunologic: Negative.    Psychiatric/Behavioral: Negative.     All other systems reviewed and are negative.        Physical Exam  Constitutional:       Appearance: She is obese.   HENT:      Head: Normocephalic and atraumatic.      Nose: Nose normal.      Mouth/Throat:      Mouth: Mucous membranes are dry.   Eyes:      Extraocular Movements: Extraocular movements intact.      Conjunctiva/sclera: Conjunctivae normal.   Cardiovascular:      Rate and Rhythm: Regular rhythm. Tachycardia present.      Pulses: Normal pulses.      Heart sounds: Normal heart sounds.   Pulmonary:      Effort: Pulmonary effort is normal.      Breath sounds: Rhonchi and rales present.      Comments: Diminished on the left with rales.  Abdominal:      General: Bowel sounds are normal.      Palpations: Abdomen is soft.   Musculoskeletal:         General: Normal range of motion.      Cervical back: Normal range of motion and neck supple.   Skin:     General: Skin is warm and dry.   Neurological:      General: No focal deficit present.      Mental Status: She is alert and oriented to person, place, and time. Mental status is at baseline.   Psychiatric:         Mood and Affect: Mood normal.            Last Recorded Vitals  Blood pressure 121/70, pulse 89, temperature 37.8 °C (100 °F), temperature source Temporal, resp. rate 18, height 1.549 m (5' 1\"), weight 79.4 kg (175 lb), SpO2 96 %.     Relevant Results           Radha Mcmahon is " a 58 y.o. 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  Assessment/Plan   Principal Problem:    Pneumonia, unspecified organism        Pneumonia  Sepsis present on admission  -CTA of the chest was done that did not show any evidence of pulmonary embolism but also showed left-sided volume loss with leftward mediastinal shift and sizable left pleural effusion.  Underlying infectious etiology is not excluded.  Also shows right middle lobe poor attachment on the right side that was not present previously that may be a focus of infection  -The patient was started on vancomycin Zosyn and azithromycin.  Will continue  -Patient with leukocytosis   -Check blood cultures and sputum cultures MRSA screen.  -Check strep and Legionella urinary antigens  -Check procalcitonin  -Pulmonology has been consulted regarding sizable pleural effusion and possible bronchoscopy     Chest pain  -Seems to be pleuritic in nature will continue with serial troponins and serial EKGs  -Echo from 2019 showed preserved ventricle ejection fraction with mild concentric left ventricular hypertrophy     COPD  SCC Lung cancer status post chemo and radiation with posttreatment findings on the left lung  -Not in COPD exacerbation at this time continue home medications once verified  -Continue follow-up at [facility] regarding lung cancer in outpatient settings     Hypokalemia  -Replace as indicated     Diabetes mellitus type 2  -Continue with sliding scale insulin ACHS Accu-Cheks and check  A1c     Hypertension  -Continue with home amlodipine and continue with rest of antihypertensives as appropriate once verified     VTE prophylaxis-Lovenox     Pulmonary comments:-Chart reviewed patient examined and obtained additional history from patient.  Currently patient with left lung collapse with pneumonic consolidation and a loculated left pleural effusion.  Patient also has volume loss in the left lung likely from endobronchial obstruction from possibly recurrent lung cancer.  Patient CT scan of the chest done on this admission however is similar to CT chest done at [facility] on 2/15/2024 (based on the report of the study available in epic system).  I would not do a bronchoscopy as this will not give any additional information which will be useful for treatment.  I doubt patient is a candidate for endobronchial stenting.  I would not also tap the left pleural effusion since her left lung is not going to open up due to endobronchial obstruction and this could potentially increase left-sided chest pain by putting pressure on the mediastinum.  As far as probable pneumonia, agree with broad-spectrum antibiotics as ordered.  Will wean oxygen as tolerated and continue bronchodilator nebs for COPD which is not in exacerbation.  Agree with the pain regimen for left pleuritic chest pain.  Patient to follow-up with her [facility] oncologist once discharged for further workup and treatment consideration of suspected recurrent lung cancer.  I shall continue to monitor this patient with you and thank you for the referral.  Boo Moran MD

## 2024-03-09 NOTE — CONSULTS
"Vancomycin Dosing by Pharmacy- INITIAL    Radha Mcmahon is a 58 y.o. year old female who Pharmacy has been consulted for vancomycin dosing for pneumonia. Based on the patient's indication and renal status this patient will be dosed based on a goal AUC of 400-600.     Renal function is currently stable.    Visit Vitals  /87 (Patient Position: Lying)   Pulse 110   Temp 38.9 °C (102 °F) (Temporal)   Resp 18        Lab Results   Component Value Date    CREATININE 0.51 03/08/2024    CREATININE 0.60 11/07/2019    CREATININE 0.60 04/06/2019        Patient weight is No results found for: \"PTWEIGHT\"    No results found for: \"CULTURE\"     No intake/output data recorded.  [unfilled]    No results found for: \"PATIENTTEMP\"       Assessment/Plan     Patient has already been given a loading dose of 2000 mg.  Will initiate vancomycin maintenance,  1250 mg every 12 hours.    This dosing regimen is predicted by [employer] to result in the following pharmacokinetic parameters:  Regimen: 1250 mg IV every 12 hours.  Start time: 04:55 on 03/09/2024  Exposure target: AUC24 (range)400-600 mg/L.hr   AUC24,ss: 470 mg/L.hr  Probability of AUC24 > 400: 66 %  Ctrough,ss: 13.5 mg/L  Probability of Ctrough,ss > 20: 23 %  Probability of nephrotoxicity (Lodise SUHAS 2009): 9 %      Follow-up level will be ordered on 03/08/24 at 2200 and 03/09/24 at 1000  unless clinically indicated sooner.  Will continue to monitor renal function daily while on vancomycin and order serum creatinine at least every 48 hours if not already ordered.  Follow for continued vancomycin needs, clinical response, and signs/symptoms of toxicity.       Estephania Moreno, PharmD       "

## 2024-03-09 NOTE — PROGRESS NOTES
Radha Mcmahon is a 58 y.o. female on day 1 of admission presenting with Pneumonia, unspecified organism.    Radha Mcmahon is a 58 y.o. female on day 1 of admission presenting with Pneumonia, unspecified organism.        Subjective   Patient seen and examined @ bedside.   Still having shortness of breath although chest pain has improved.  Denies any fevers or chills nausea or vomiting.          Objective   Last Recorded Vitals  BP 93/55 (Patient Position: Lying)   Pulse 82   Temp 37.7 °C (99.9 °F) (Temporal)   Resp 16   Wt 78.5 kg (173 lb 1 oz)   SpO2 94%   Intake/Output last 3 Shifts:     Intake/Output Summary (Last 24 hours) at 3/9/2024 1647  Last data filed at 3/9/2024 1234      Gross per 24 hour   Intake 2060 ml   Output 250 ml   Net 1810 ml         Admission Weight  Weight: 79.4 kg (175 lb) (03/08/24 1332)     Daily Weight  03/08/24 : 78.5 kg (173 lb 1 oz)       Review of Systems   Constitutional:  Positive for fatigue.   HENT: Negative.     Eyes: Negative.    Respiratory:  Positive for shortness of breath.    Cardiovascular:  Positive for chest pain.   Gastrointestinal: Negative.    Endocrine: Negative.    Musculoskeletal: Negative.    Skin: Negative.    Allergic/Immunologic: Negative.    Psychiatric/Behavioral: Negative.     All other systems reviewed and are negative.        Physical Exam  Constitutional:       Appearance: She is obese.   HENT:      Head: Normocephalic and atraumatic.      Nose: Nose normal.      Mouth/Throat:      Mouth: Mucous membranes are dry.   Eyes:      Extraocular Movements: Extraocular movements intact.      Conjunctiva/sclera: Conjunctivae normal.   Cardiovascular:      Rate and Rhythm: Regular rhythm. Tachycardia present.      Pulses: Normal pulses.      Heart sounds: Normal heart sounds.   Pulmonary:      Effort: Pulmonary effort is normal.      Breath sounds: Rhonchi and rales present.      Comments: Diminished on the left with rales.  Abdominal:       "General: Bowel sounds are normal.      Palpations: Abdomen is soft.   Musculoskeletal:         General: Normal range of motion.      Cervical back: Normal range of motion and neck supple.   Skin:     General: Skin is warm and dry.   Neurological:      General: No focal deficit present.      Mental Status: She is alert and oriented to person, place, and time. Mental status is at baseline.   Psychiatric:         Mood and Affect: Mood normal.            Last Recorded Vitals  Blood pressure 121/70, pulse 89, temperature 37.8 °C (100 °F), temperature source Temporal, resp. rate 18, height 1.549 m (5' 1\"), weight 79.4 kg (175 lb), SpO2 96 %.     Relevant Results           Radha Mcmahon is a 58 y.o. 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  Assessment/Plan   Principal Problem:    Pneumonia, unspecified organism        Pneumonia  Sepsis present on admission  -CTA of the chest was done that did not show any evidence of pulmonary embolism but also showed left-sided volume loss with leftward mediastinal shift and sizable left pleural effusion.  Underlying infectious etiology is not excluded.  Also shows right middle lobe poor attachment on the right side that was not present previously that may be a focus of infection  -The patient was started on vancomycin Zosyn and azithromycin.  Will continue  -Patient with leukocytosis   -Check blood cultures and sputum cultures MRSA screen.  -Check strep and Legionella urinary antigens  -Check " procalcitonin  -I was  consulted regarding sizable pleural effusion and possible bronchoscopy     Chest pain  -Seems to be pleuritic in nature will continue with serial troponins and serial EKGs  -Echo from 2019 showed preserved ventricle ejection fraction with mild concentric left ventricular hypertrophy     COPD  SCC Lung cancer status post chemo and radiation with posttreatment findings on the left lung  -Not in COPD exacerbation at this time continue home medications once verified  -Continue follow-up at [facility] regarding lung cancer in outpatient settings     Hypokalemia  -Replace as indicated     Diabetes mellitus type 2  -Continue with sliding scale insulin ACHS Accu-Cheks and check A1c     Hypertension  -Continue with home amlodipine and continue with rest of antihypertensives as appropriate once verified     VTE prophylaxis-Lovenox     Pulmonary comments:-Chart reviewed patient examined and obtained additional history from patient.  Currently patient with left lung collapse with pneumonic consolidation and a loculated left pleural effusion.  Patient also has volume loss in the left lung likely from endobronchial obstruction from possibly recurrent lung cancer.  Patient CT scan of the chest done on this admission however is similar to CT chest done at [facility] on 2/15/2024 (based on the report of the study available in epic system).  I would not do a bronchoscopy as this will not give any additional information which will be useful for treatment.  I doubt patient is a candidate for endobronchial stenting.  I would not also tap the left pleural effusion since her left lung is not going to open up due to endobronchial obstruction and this could potentially increase left-sided chest pain by putting pressure on the mediastinum.  As far as probable pneumonia, agree with broad-spectrum antibiotics as ordered.  Will wean oxygen as tolerated and continue bronchodilator nebs for COPD which is not in exacerbation.  Agree with  the pain regimen for left pleuritic chest pain.  Patient to follow-up with her [facility] oncologist once discharged for further workup and treatment consideration of suspected recurrent lung cancer.  I shall continue to monitor this patient with you and thank you for the referral. Plan of care also discussed with patient and her daughter at the bedside    Boo Moran MD

## 2024-03-09 NOTE — PROGRESS NOTES
"Vancomycin Dosing by Pharmacy- FOLLOW UP    Radha Mcmahon is a 58 y.o. year old female who Pharmacy has been consulted for vancomycin dosing for pneumonia. Based on the patient's indication and renal status this patient is being dosed based on a goal AUC of 400-600.     Renal function is currently stable.    Current vancomycin dose: 1250 mg given every 12 hours    Estimated vancomycin AUC on current dose: 538 mg/L.hr     Visit Vitals  /65   Pulse 84   Temp 37.1 °C (98.8 °F)   Resp 18        Lab Results   Component Value Date    CREATININE 0.54 03/09/2024    CREATININE 0.53 03/08/2024    CREATININE 0.51 03/08/2024    CREATININE 0.60 11/07/2019    CREATININE 0.60 04/06/2019        Patient weight is 78.5 kg    No results found for: \"CULTURE\"     I/O last 3 completed shifts:  In: 2050 (26.1 mL/kg) [IV Piggyback:2050]  Out: 250 (3.2 mL/kg) [Urine:250 (0.1 mL/kg/hr)]  Weight: 78.5 kg   [unfilled]    No results found for: \"PATIENTTEMP\"     Assessment/Plan    Within goal AUC range. Continue current vancomycin regimen.    This dosing regimen is predicted by [employer] to result in the following pharmacokinetic parameters:    Regimen: 1250 mg IV every 12 hours.  Exposure target: AUC24 (range)400-600 mg/L.hr   AUC24,ss: 538 mg/L.hr  Probability of AUC24 > 400: 94 %  Ctrough,ss: 15.3 mg/L  Probability of Ctrough,ss > 20: 19 %  Probability of nephrotoxicity (Lodise SUHAS 2009): 11 %    The next level will be obtained on 3/11/24 at 10am. May be obtained sooner if clinically indicated.   Will continue to monitor renal function daily while on vancomycin and order serum creatinine at least every 48 hours if not already ordered.  Follow for continued vancomycin needs, clinical response, and signs/symptoms of toxicity.       Kristine E Steinert, [facility]           "

## 2024-03-09 NOTE — CARE PLAN
The patient's goals for the shift include      The clinical goals for the shift include Decrease SOB anad left chest pain

## 2024-03-09 NOTE — PROGRESS NOTES
Radha Mcmahon is a 58 y.o. female on day 1 of admission presenting with Pneumonia, unspecified organism.      Subjective   Patient seen and assessed during rounds.  Patient still somewhat sleepy.  Still having shortness of breath although chest pain has improved.  Denies any fevers or chills nausea or vomiting.       Objective     Last Recorded Vitals  BP 93/55 (Patient Position: Lying)   Pulse 82   Temp 37.7 °C (99.9 °F) (Temporal)   Resp 16   Wt 78.5 kg (173 lb 1 oz)   SpO2 94%   Intake/Output last 3 Shifts:    Intake/Output Summary (Last 24 hours) at 3/9/2024 1647  Last data filed at 3/9/2024 1234  Gross per 24 hour   Intake 2060 ml   Output 250 ml   Net 1810 ml       Admission Weight  Weight: 79.4 kg (175 lb) (03/08/24 1332)    Daily Weight  03/08/24 : 78.5 kg (173 lb 1 oz)    Image Results  CT angio chest for pulmonary embolism  Narrative: Interpreted By:  Mo Kee,   STUDY:  CT ANGIO CHEST FOR PULMONARY EMBOLISM;  3/8/2024 4:17 pm      INDICATION:  57 y/o   F with  Signs/Symptoms:sob.      LIMITATIONS:  None.      ACCESSION NUMBER(S):  [id number]      ORDERING CLINICIAN:  EBONY KILGORE      TECHNIQUE:  After the administration of intravenous nonionic contrast,  thin-section arterial phase images were obtained  from the thoracic  inlet down through the iliac crest. Sagittal and coronal  reconstruction images were generated. Axial and coronal MIP vascular  reconstruction images were also generated.   Mediastinal, lung, bone,  and liver windows were reviewed. IV contrast agent was Omnipaque 350,  75 mL.          COMPARISON:  Previous CT pulmonary angiogram dated 11/07/2019.      FINDINGS:  CHEST WALL/BASE OF THE NECK:  The chest wall was grossly intact.  No thyromegaly or thyroid mass.      MEDIASTINUM/FAROOQ:  There is mild subcarinal mediastinal adenopathy with lymph nodes  measuring 12 mm AP or less. There is pretracheal adenopathy measuring  8 mm short axis or less. There is mild right  hilar adenopathy with  lymph nodes measuring 11 mm short axis or less. Cannot accurately  assess the left hilum for adenopathy. No axillary adenopathy. Mild  cardiomegaly. No pericardial effusion.  No thoracic aortic aneurysm or dissection. Mild mural calcifications  in the thoracic aorta. No pulmonary artery filling defect. Left lobar  and segmental vessels are  diminutive peripherally.      LUNGS/ PLEURA/ AND TRACHEA:  There is no pleural effusion on the right. There is mild compensatory  hyper aeration of the right lung. There is a small old area of  localized but ill-defined linear and also somewhat nodular density  with associated pleural thickening anteriorly in the mid right upper  lobe on axial images 62 through 90 of the lung windows. No other  abnormal density in the right lung.      There is prominent left-sided volume loss with mediastinal shift to  the left. There is moderate pleural fluid on the left. The left lung  shows diffuse bronchiectasis with surrounding  consolidated/atelectatic lung. The lung density surrounds the left  hilum. The trachea was grossly intact.      BONES:  No destructive lytic or blastic bone lesion.      UPPER ABDOMEN:  The imaged upper abdomen was grossly intact.      Impression: No CT evidence of pulmonary embolism in the current exam.      There is left-sided volume loss with leftward mediastinal shift.  Sizable left pleural effusion. Atelectasis/consolidation of the left  lung with mild-to-moderate associated bronchiectasis. Underlying  infectious etiology is not excluded. Consider bronchoscopy in  follow-up.      Mild nonspecific mediastinal and right hilar adenopathy.      Small area linear and somewhat nodular infiltrative density with  pleural attachment anteriorly in the mid  right upper lobe, not  present previously. This could be a small focus of infection.  Interval development of an area of pleural and parenchymal scarring  is the main differential consideration.       Cardiomegaly.      MACRO:  None      Signed by: Mo Kee 3/8/2024 4:47 PM  Dictation workstation:   [id number]  XR chest 2 views  Narrative: Interpreted By:  Sixto Sinha,   STUDY:  XR CHEST 2 VIEWS;  3/8/2024 2:05 pm      INDICATION:  Signs/Symptoms:Chest Pain, sob, cough.      COMPARISON:  11/07/2019      ACCESSION NUMBER(S):  [id number]      ORDERING CLINICIAN:  EBONY KILGORE      FINDINGS:  There is near complete opacification of the left hemithorax, with  minimal aeration of the upper lobe. This would indicate consolidating  infiltrate, including possibly postobstructive from underlying mass  or neoplasm, with volume loss as there shift of the heart mediastinum  to the left. However, there may also be an effusion. Follow-up or CT  would be recommended. The right lung is clear in the right pulmonary  vasculature unremarkable. The previous chest film demonstrated  opacification suspicious for a mass in the upper lung medially.      ABDOMEN AND OTHER FINDINGS:  No remarkable upper abdominal findings.      BONES:  No acute osseous changes.      Impression: 1.  Near-complete opacification of the left hemithorax with volume  loss as described.      Signed by: Sixto Sinha 3/8/2024 2:25 PM  Dictation workstation:   [id number]  ECG 12 lead  Normal sinus rhythm  Possible Left atrial enlargement  Borderline ECG  When compared with ECG of 07-NOV-2019 09:50,  Nonspecific T wave abnormality now evident in Lateral leads    See ED provider note for full interpretation and clinical correlation      Physical Exam  Constitutional:       Appearance: She is ill-appearing.   HENT:      Nose: Congestion present.   Eyes:      Pupils: Pupils are equal, round, and reactive to light.   Cardiovascular:      Rate and Rhythm: Normal rate and regular rhythm.      Pulses: Normal pulses.   Pulmonary:      Effort: Pulmonary effort is normal.      Breath sounds: Rhonchi and rales present.   Abdominal:      General: Bowel sounds  are normal.      Palpations: Abdomen is soft.      Tenderness: There is no abdominal tenderness.   Musculoskeletal:         General: No swelling or tenderness.   Skin:     General: Skin is dry.      Capillary Refill: Capillary refill takes less than 2 seconds.      Coloration: Skin is pale.   Neurological:      General: No focal deficit present.      Mental Status: She is alert.      Motor: Weakness present.   Psychiatric:         Mood and Affect: Mood normal.         Relevant Results      Results for orders placed or performed during the hospital encounter of 03/08/24 (from the past 96 hour(s))   ECG 12 lead   Result Value Ref Range    Ventricular Rate 92 BPM    Atrial Rate 92 BPM    VT Interval 158 ms    QRS Duration 92 ms    QT Interval 368 ms    QTC Calculation(Bazett) 455 ms    P Axis 25 degrees    R Axis -28 degrees    T Axis 79 degrees    QRS Count 15 beats    Q Onset 212 ms    P Onset 133 ms    P Offset 191 ms    T Offset 396 ms    QTC Fredericia 424 ms   CBC and Auto Differential   Result Value Ref Range    WBC 15.7 (H) 4.4 - 11.3 x10*3/uL    nRBC 0.0 0.0 - 0.0 /100 WBCs    RBC 4.31 4.00 - 5.20 x10*6/uL    Hemoglobin 11.2 (L) 12.0 - 16.0 g/dL    Hematocrit 34.3 (L) 36.0 - 46.0 %    MCV 80 80 - 100 fL    MCH 26.0 26.0 - 34.0 pg    MCHC 32.7 32.0 - 36.0 g/dL    RDW 15.3 (H) 11.5 - 14.5 %    Platelets 317 150 - 450 x10*3/uL    Neutrophils % 80.2 40.0 - 80.0 %    Immature Granulocytes %, Automated 0.4 0.0 - 0.9 %    Lymphocytes % 8.3 13.0 - 44.0 %    Monocytes % 10.4 2.0 - 10.0 %    Eosinophils % 0.3 0.0 - 6.0 %    Basophils % 0.4 0.0 - 2.0 %    Neutrophils Absolute 12.61 (H) 1.20 - 7.70 x10*3/uL    Immature Granulocytes Absolute, Automated 0.07 0.00 - 0.70 x10*3/uL    Lymphocytes Absolute 1.31 1.20 - 4.80 x10*3/uL    Monocytes Absolute 1.63 (H) 0.10 - 1.00 x10*3/uL    Eosinophils Absolute 0.05 0.00 - 0.70 x10*3/uL    Basophils Absolute 0.06 0.00 - 0.10 x10*3/uL   Comprehensive Metabolic Panel   Result Value  Ref Range    Glucose 119 (H) 74 - 99 mg/dL    Sodium 138 136 - 145 mmol/L    Potassium 3.2 (L) 3.5 - 5.3 mmol/L    Chloride 101 98 - 107 mmol/L    Bicarbonate 27 21 - 32 mmol/L    Anion Gap 13 10 - 20 mmol/L    Urea Nitrogen 8 6 - 23 mg/dL    Creatinine 0.51 0.50 - 1.05 mg/dL    eGFR >90 >60 mL/min/1.73m*2    Calcium 8.7 8.6 - 10.3 mg/dL    Albumin 3.2 (L) 3.4 - 5.0 g/dL    Alkaline Phosphatase 96 33 - 110 U/L    Total Protein 7.5 6.4 - 8.2 g/dL    AST 25 9 - 39 U/L    Bilirubin, Total 0.4 0.0 - 1.2 mg/dL    ALT 27 7 - 45 U/L   Magnesium   Result Value Ref Range    Magnesium 1.73 1.60 - 2.40 mg/dL   Protime-INR   Result Value Ref Range    Protime 18.3 (H) 9.8 - 12.8 seconds    INR 1.6 (H) 0.9 - 1.1   Lipase   Result Value Ref Range    Lipase 6 (L) 9 - 82 U/L   B-Type Natriuretic Peptide   Result Value Ref Range     (H) 0 - 99 pg/mL   Sars-CoV-2 and Influenza A/B PCR   Result Value Ref Range    Flu A Result Not Detected Not Detected    Flu B Result Not Detected Not Detected    Coronavirus 2019, PCR Not Detected Not Detected   Blood Culture    Specimen: Peripheral Venipuncture; Blood culture   Result Value Ref Range    Gram Stain Gram positive cocci, pairs and chains (AA)    Blood Culture    Specimen: Peripheral Venipuncture; Blood culture   Result Value Ref Range    Gram Stain Gram positive cocci, pairs and chains (AA)    Troponin I, High Sensitivity, Initial   Result Value Ref Range    Troponin I, High Sensitivity 12 0 - 13 ng/L   Troponin, High Sensitivity, 1 Hour   Result Value Ref Range    Troponin I, High Sensitivity 11 0 - 13 ng/L   Lactate   Result Value Ref Range    Lactate 0.7 0.4 - 2.0 mmol/L   Procalcitonin   Result Value Ref Range    Procalcitonin 0.10 (H) <=0.07 ng/mL   Comprehensive metabolic panel   Result Value Ref Range    Glucose 122 (H) 74 - 99 mg/dL    Sodium 136 136 - 145 mmol/L    Potassium 3.0 (L) 3.5 - 5.3 mmol/L    Chloride 103 98 - 107 mmol/L    Bicarbonate 27 21 - 32 mmol/L    Anion  Gap 9 (L) 10 - 20 mmol/L    Urea Nitrogen 5 (L) 6 - 23 mg/dL    Creatinine 0.53 0.50 - 1.05 mg/dL    eGFR >90 >60 mL/min/1.73m*2    Calcium 7.7 (L) 8.6 - 10.3 mg/dL    Albumin 2.8 (L) 3.4 - 5.0 g/dL    Alkaline Phosphatase 100 33 - 110 U/L    Total Protein 6.5 6.4 - 8.2 g/dL    AST 31 9 - 39 U/L    Bilirubin, Total 0.6 0.0 - 1.2 mg/dL    ALT 29 7 - 45 U/L   Vancomycin   Result Value Ref Range    Vancomycin 19.1 5.0 - 20.0 ug/mL   MRSA Surveillance for Vancomycin De-escalation, PCR    Specimen: Anterior Nares; Swab   Result Value Ref Range    MRSA PCR Not Detected Not Detected   CBC   Result Value Ref Range    WBC 22.1 (H) 4.4 - 11.3 x10*3/uL    nRBC 0.0 0.0 - 0.0 /100 WBCs    RBC 3.88 (L) 4.00 - 5.20 x10*6/uL    Hemoglobin 10.1 (L) 12.0 - 16.0 g/dL    Hematocrit 31.6 (L) 36.0 - 46.0 %    MCV 81 80 - 100 fL    MCH 26.0 26.0 - 34.0 pg    MCHC 32.0 32.0 - 36.0 g/dL    RDW 15.8 (H) 11.5 - 14.5 %    Platelets 300 150 - 450 x10*3/uL   Comprehensive metabolic panel   Result Value Ref Range    Glucose 98 74 - 99 mg/dL    Sodium 137 136 - 145 mmol/L    Potassium 3.4 (L) 3.5 - 5.3 mmol/L    Chloride 102 98 - 107 mmol/L    Bicarbonate 26 21 - 32 mmol/L    Anion Gap 12 10 - 20 mmol/L    Urea Nitrogen 6 6 - 23 mg/dL    Creatinine 0.54 0.50 - 1.05 mg/dL    eGFR >90 >60 mL/min/1.73m*2    Calcium 8.1 (L) 8.6 - 10.3 mg/dL    Albumin 2.8 (L) 3.4 - 5.0 g/dL    Alkaline Phosphatase 127 (H) 33 - 110 U/L    Total Protein 6.5 6.4 - 8.2 g/dL    AST 35 9 - 39 U/L    Bilirubin, Total 0.8 0.0 - 1.2 mg/dL    ALT 32 7 - 45 U/L   POCT GLUCOSE   Result Value Ref Range    POCT Glucose 140 (H) 74 - 99 mg/dL   Vancomycin   Result Value Ref Range    Vancomycin 20.5 (H) 5.0 - 20.0 ug/mL   POCT GLUCOSE   Result Value Ref Range    POCT Glucose 124 (H) 74 - 99 mg/dL   Urinalysis with Reflex Culture and Microscopic   Result Value Ref Range    Color, Urine Yellow Straw, Yellow    Appearance, Urine Hazy (N) Clear    Specific Gravity, Urine 1.014 1.005 -  1.035    pH, Urine 5.0 5.0, 5.5, 6.0, 6.5, 7.0, 7.5, 8.0    Protein, Urine NEGATIVE NEGATIVE mg/dL    Glucose, Urine NEGATIVE NEGATIVE mg/dL    Blood, Urine SMALL (1+) (A) NEGATIVE    Ketones, Urine NEGATIVE NEGATIVE mg/dL    Bilirubin, Urine NEGATIVE NEGATIVE    Urobilinogen, Urine 4.0 (N) <2.0 mg/dL    Nitrite, Urine NEGATIVE NEGATIVE    Leukocyte Esterase, Urine NEGATIVE NEGATIVE   Urinalysis Microscopic   Result Value Ref Range    WBC, Urine 1-5 1-5, NONE /HPF    RBC, Urine 1-2 NONE, 1-2, 3-5 /HPF    Squamous Epithelial Cells, Urine 1-9 (SPARSE) Reference range not established. /HPF    Mucus, Urine 1+ Reference range not established. /LPF   POCT GLUCOSE   Result Value Ref Range    POCT Glucose 154 (H) 74 - 99 mg/dL       No results found for the last 90 days.               Assessment/Plan      Radha Mcmahon is a 58 y.o. 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             Principal Problem:    Pneumonia, unspecified organism    Pneumonia  Sepsis present on admission  -CTA of the chest was done that did not show any evidence of pulmonary embolism but also showed left-sided volume loss with leftward mediastinal shift and sizable left pleural effusion.  Underlying infectious etiology is not excluded.  Also shows right middle lobe poor attachment on the right side that was not present previously that may be a focus of infection  -The patient was started on vancomycin Zosyn and azithromycin  On admission and will continue  -Patient  with leukocytosis  blood cultures growing gram-positive cocci in chains.  Still awaiting urine strep and Legionella.  MRSA screen negative  - procalcitonin 0.10  -Pulmonology has been consulted regarding sizable pleural effusion and possible bronchoscopy  awaiting recommendations.   - had extensive discussion with daughter Marialuisa about patient's scans, labs, and treatment plan     Chest pain  - most likely pleuritic in nature.  EKGs and  troponins with no significant change  -Echo from 2019 showed preserved ventricle ejection fraction with mild concentric left ventricular hypertrophy     COPD  SCC Lung cancer status post chemo and radiation with posttreatment findings on the left lung  -Not in COPD exacerbation at this time continue home medications once verified  -Continue follow-up at [facility] regarding lung cancer in outpatient settings     Hypokalemia  - potassium 3.4 today will replete with 40 mEq oral     Diabetes mellitus type 2  -Continue with sliding scale insulin ACHS Accu-Cheks and check A1c     Hypertension  -Continue with home amlodipine and continue with rest of antihypertensives as appropriate once verified     VTE prophylaxis-Lovenox              Ellyn Quesada MD

## 2024-03-10 LAB
ANION GAP SERPL CALC-SCNC: 13 MMOL/L (ref 10–20)
ATRIAL RATE: 92 BPM
BUN SERPL-MCNC: 17 MG/DL (ref 6–23)
CALCIUM SERPL-MCNC: 8.5 MG/DL (ref 8.6–10.3)
CHLORIDE SERPL-SCNC: 105 MMOL/L (ref 98–107)
CO2 SERPL-SCNC: 25 MMOL/L (ref 21–32)
CREAT SERPL-MCNC: 2.29 MG/DL (ref 0.5–1.05)
EGFRCR SERPLBLD CKD-EPI 2021: 24 ML/MIN/1.73M*2
ERYTHROCYTE [DISTWIDTH] IN BLOOD BY AUTOMATED COUNT: 15.7 % (ref 11.5–14.5)
GLUCOSE BLD MANUAL STRIP-MCNC: 142 MG/DL (ref 74–99)
GLUCOSE SERPL-MCNC: 113 MG/DL (ref 74–99)
HCT VFR BLD AUTO: 31.7 % (ref 36–46)
HGB BLD-MCNC: 10.3 G/DL (ref 12–16)
LEGIONELLA AG UR QL: NEGATIVE
MCH RBC QN AUTO: 26.2 PG (ref 26–34)
MCHC RBC AUTO-ENTMCNC: 32.5 G/DL (ref 32–36)
MCV RBC AUTO: 81 FL (ref 80–100)
NRBC BLD-RTO: 0 /100 WBCS (ref 0–0)
P AXIS: 25 DEGREES
P OFFSET: 191 MS
P ONSET: 133 MS
PLATELET # BLD AUTO: 331 X10*3/UL (ref 150–450)
POTASSIUM SERPL-SCNC: 3.9 MMOL/L (ref 3.5–5.3)
PR INTERVAL: 158 MS
Q ONSET: 212 MS
QRS COUNT: 15 BEATS
QRS DURATION: 92 MS
QT INTERVAL: 368 MS
QTC CALCULATION(BAZETT): 455 MS
QTC FREDERICIA: 424 MS
R AXIS: -28 DEGREES
RBC # BLD AUTO: 3.93 X10*6/UL (ref 4–5.2)
S PNEUM AG UR QL: NEGATIVE
SODIUM SERPL-SCNC: 139 MMOL/L (ref 136–145)
STAPHYLOCOCCUS SPEC CULT: NORMAL
T AXIS: 79 DEGREES
T OFFSET: 396 MS
VENTRICULAR RATE: 92 BPM
WBC # BLD AUTO: 20.9 X10*3/UL (ref 4.4–11.3)

## 2024-03-10 PROCEDURE — 2500000001 HC RX 250 WO HCPCS SELF ADMINISTERED DRUGS (ALT 637 FOR MEDICARE OP): Performed by: INTERNAL MEDICINE

## 2024-03-10 PROCEDURE — 2500000002 HC RX 250 W HCPCS SELF ADMINISTERED DRUGS (ALT 637 FOR MEDICARE OP, ALT 636 FOR OP/ED): Performed by: INTERNAL MEDICINE

## 2024-03-10 PROCEDURE — 1200000002 HC GENERAL ROOM WITH TELEMETRY DAILY

## 2024-03-10 PROCEDURE — 99232 SBSQ HOSP IP/OBS MODERATE 35: CPT | Performed by: STUDENT IN AN ORGANIZED HEALTH CARE EDUCATION/TRAINING PROGRAM

## 2024-03-10 PROCEDURE — 2500000004 HC RX 250 GENERAL PHARMACY W/ HCPCS (ALT 636 FOR OP/ED): Mod: JZ

## 2024-03-10 PROCEDURE — 82947 ASSAY GLUCOSE BLOOD QUANT: CPT

## 2024-03-10 PROCEDURE — 36415 COLL VENOUS BLD VENIPUNCTURE: CPT | Performed by: STUDENT IN AN ORGANIZED HEALTH CARE EDUCATION/TRAINING PROGRAM

## 2024-03-10 PROCEDURE — 85027 COMPLETE CBC AUTOMATED: CPT | Performed by: STUDENT IN AN ORGANIZED HEALTH CARE EDUCATION/TRAINING PROGRAM

## 2024-03-10 PROCEDURE — 2500000004 HC RX 250 GENERAL PHARMACY W/ HCPCS (ALT 636 FOR OP/ED): Performed by: INTERNAL MEDICINE

## 2024-03-10 PROCEDURE — 80048 BASIC METABOLIC PNL TOTAL CA: CPT | Performed by: STUDENT IN AN ORGANIZED HEALTH CARE EDUCATION/TRAINING PROGRAM

## 2024-03-10 RX ADMIN — ENOXAPARIN SODIUM 40 MG: 40 INJECTION SUBCUTANEOUS at 18:28

## 2024-03-10 RX ADMIN — POTASSIUM CHLORIDE 20 MEQ: 1.5 POWDER, FOR SOLUTION ORAL at 20:08

## 2024-03-10 RX ADMIN — PIPERACILLIN SODIUM AND TAZOBACTAM SODIUM 3.38 G: 3; .375 INJECTION, SOLUTION INTRAVENOUS at 22:51

## 2024-03-10 RX ADMIN — VANCOMYCIN HYDROCHLORIDE 1250 MG: 1.25 INJECTION, POWDER, LYOPHILIZED, FOR SOLUTION INTRAVENOUS at 18:27

## 2024-03-10 RX ADMIN — HYDROCODONE BITARTRATE AND ACETAMINOPHEN 1 TABLET: 5; 325 TABLET ORAL at 18:28

## 2024-03-10 RX ADMIN — SODIUM CHLORIDE 10 ML: 9 INJECTION, SOLUTION INTRAVENOUS at 06:18

## 2024-03-10 RX ADMIN — VANCOMYCIN HYDROCHLORIDE 1250 MG: 1.25 INJECTION, POWDER, LYOPHILIZED, FOR SOLUTION INTRAVENOUS at 04:55

## 2024-03-10 RX ADMIN — SODIUM CHLORIDE 10 ML: 9 INJECTION, SOLUTION INTRAVENOUS at 14:09

## 2024-03-10 RX ADMIN — PIPERACILLIN SODIUM AND TAZOBACTAM SODIUM 3.38 G: 3; .375 INJECTION, SOLUTION INTRAVENOUS at 06:18

## 2024-03-10 RX ADMIN — PIPERACILLIN SODIUM AND TAZOBACTAM SODIUM 3.38 G: 3; .375 INJECTION, SOLUTION INTRAVENOUS at 14:09

## 2024-03-10 RX ADMIN — HYDROCODONE BITARTRATE AND ACETAMINOPHEN 1 TABLET: 5; 325 TABLET ORAL at 12:29

## 2024-03-10 RX ADMIN — AMLODIPINE BESYLATE 10 MG: 5 TABLET ORAL at 09:17

## 2024-03-10 RX ADMIN — HYDROCODONE BITARTRATE AND ACETAMINOPHEN 1 TABLET: 5; 325 TABLET ORAL at 06:28

## 2024-03-10 RX ADMIN — AZITHROMYCIN 500 MG: 250 TABLET, FILM COATED ORAL at 09:17

## 2024-03-10 RX ADMIN — SODIUM CHLORIDE 10 ML: 9 INJECTION, SOLUTION INTRAVENOUS at 22:50

## 2024-03-10 RX ADMIN — POTASSIUM CHLORIDE 20 MEQ: 1.5 POWDER, FOR SOLUTION ORAL at 09:17

## 2024-03-10 ASSESSMENT — COGNITIVE AND FUNCTIONAL STATUS - GENERAL
STANDING UP FROM CHAIR USING ARMS: A LITTLE
CLIMB 3 TO 5 STEPS WITH RAILING: A LITTLE
CLIMB 3 TO 5 STEPS WITH RAILING: A LITTLE
DAILY ACTIVITIY SCORE: 23
MOBILITY SCORE: 20
WALKING IN HOSPITAL ROOM: A LITTLE
MOVING TO AND FROM BED TO CHAIR: A LITTLE
WALKING IN HOSPITAL ROOM: A LITTLE
MOBILITY SCORE: 20
DRESSING REGULAR LOWER BODY CLOTHING: A LITTLE
MOVING TO AND FROM BED TO CHAIR: A LITTLE
STANDING UP FROM CHAIR USING ARMS: A LITTLE
DRESSING REGULAR LOWER BODY CLOTHING: A LITTLE
DAILY ACTIVITIY SCORE: 23

## 2024-03-10 ASSESSMENT — PAIN SCALES - GENERAL
PAINLEVEL_OUTOF10: 8
PAINLEVEL_OUTOF10: 7
PAINLEVEL_OUTOF10: 3
PAINLEVEL_OUTOF10: 8
PAINLEVEL_OUTOF10: 0 - NO PAIN

## 2024-03-10 ASSESSMENT — PAIN DESCRIPTION - LOCATION
LOCATION: CHEST

## 2024-03-10 ASSESSMENT — PAIN - FUNCTIONAL ASSESSMENT
PAIN_FUNCTIONAL_ASSESSMENT: 0-10

## 2024-03-10 ASSESSMENT — PAIN DESCRIPTION - ORIENTATION
ORIENTATION: LEFT

## 2024-03-10 NOTE — PROGRESS NOTES
Radha Mcmahon is a 58 y.o. female on day 2 of admission presenting with Pneumonia, unspecified organism.      Subjective    patient seen with family at bedside.  Patient denies any worsening cough, shortness of breath, chest pain, fevers, chills, nausea       Objective     Last Recorded Vitals  BP 97/55 (BP Location: Right arm, Patient Position: Lying)   Pulse 80   Temp 36.7 °C (98.1 °F) (Tympanic)   Resp 18   Wt 78.5 kg (173 lb 1 oz)   SpO2 96%   Intake/Output last 3 Shifts:    Intake/Output Summary (Last 24 hours) at 3/10/2024 1500  Last data filed at 3/10/2024 1335  Gross per 24 hour   Intake 1033.5 ml   Output 800 ml   Net 233.5 ml       Admission Weight  Weight: 79.4 kg (175 lb) (03/08/24 1332)    Daily Weight  03/08/24 : 78.5 kg (173 lb 1 oz)    Image Results  ECG 12 lead  Normal sinus rhythm  Possible Left atrial enlargement  Borderline ECG  When compared with ECG of 07-NOV-2019 09:50,  Nonspecific T wave abnormality now evident in Lateral leads    See ED provider note for full interpretation and clinical correlation    Confirmed by Brenda Small ([id number]) on 3/10/2024 12:07:46 PM      Physical Exam      Constitutional:       Appearance: She is ill-appearing.   HENT:      Nose: Congestion present.   Eyes:      Pupils: Pupils are equal, round, and reactive to light.   Cardiovascular:      Rate and Rhythm: Normal rate and regular rhythm.      Pulses: Normal pulses.   Pulmonary:      Effort: Pulmonary effort is normal.      Breath sounds: Rhonchi and rales present.   Abdominal:      General: Bowel sounds are normal.      Palpations: Abdomen is soft.      Tenderness: There is no abdominal tenderness.   Musculoskeletal:         General: No swelling or tenderness.   Skin:     General: Skin is dry.      Capillary Refill: Capillary refill takes less than 2 seconds.      Coloration: Skin is pale.   Neurological:      General: No focal deficit present.      Mental Status: She is alert.      Motor:  Weakness present.   Psychiatric:         Mood and Affect: Mood normal.       Relevant Results      Results for orders placed or performed during the hospital encounter of 03/08/24 (from the past 24 hour(s))   POCT GLUCOSE   Result Value Ref Range    POCT Glucose 154 (H) 74 - 99 mg/dL   POCT GLUCOSE   Result Value Ref Range    POCT Glucose 142 (H) 74 - 99 mg/dL                Assessment/Plan     Radha Mcmahon is a 58 y.o. 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                  Principal Problem:    Pneumonia, unspecified organism     Pneumonia  Sepsis present on admission  -CTA of the chest was done that did not show any evidence of pulmonary embolism but also showed left-sided volume loss with leftward mediastinal shift and sizable left pleural effusion.  Underlying infectious etiology is not excluded.  Also shows right middle lobe poor attachment on the right side that was not present previously that may be a focus of infection  -The patient was started on vancomycin Zosyn and azithromycin  On admission and will continue  -Patient with leukocytosis  blood cultures growing gram-positive cocci in chains.  Still awaiting urine strep and Legionella.  MRSA screen negative  - procalcitonin 0.10  -Pulmonology has been consulted regarding sizable pleural effusion and possible bronchoscopy.  pulmonology are stating that they would not do a bronchoscopy at this point in time given that patient's current CT chest is similar to CT chest from  2/15/2024.  Patient is not a good candidate for endobronchial stenting.  Also recommending that left pleural effusion tap could lead to potential increase in left-sided chest pain.  Continue antibiotics for pneumonia.  - had extensive discussion with daughter Marialuisa about patient's scans, labs, and treatment plan  -  Patient will most likely need to follow-up with her  oncologist at [facility]     Chest pain  - most likely pleuritic in nature.   currently well-controlled. EKGs and  troponins with no significant change  -Echo from 2019 showed preserved ventricle ejection fraction with mild concentric left ventricular hypertrophy     COPD  SCC Lung cancer status post chemo and radiation with posttreatment findings on the left lung  -Not in COPD exacerbation at this time continue home medications once verified  -Continue follow-up at [facility] regarding lung cancer in outpatient settings     Hypokalemia  - potassium  repleted with 40 mEq potassium.  Rechecking BMP     Diabetes mellitus type 2  -Continue with sliding scale insulin ACHS Accu-Cheks and check A1c     Hypertension  -Continue with home amlodipine and continue with rest of antihypertensives as appropriate once verified     VTE prophylaxis-Lovenox       Ellyn Quesada MD    Total time >35 minutes; > 50% spent counseling/coordinating care

## 2024-03-10 NOTE — PROGRESS NOTES
Radha Mcmahon is a 58 y.o. female on day 2 of admission presenting with Pneumonia, unspecified organism.           Subjective   patient seen with family at bedside.  Patient denies any worsening cough, shortness of breath, chest pain, fevers, chills, nausea        Objective   Last Recorded Vitals  BP 97/55 (BP Location: Right arm, Patient Position: Lying)   Pulse 80   Temp 36.7 °C (98.1 °F) (Tympanic)   Resp 18   Wt 78.5 kg (173 lb 1 oz)   SpO2 96%   Intake/Output last 3 Shifts:     Intake/Output Summary (Last 24 hours) at 3/10/2024 1500  Last data filed at 3/10/2024 1335      Gross per 24 hour   Intake 1033.5 ml   Output 800 ml   Net 233.5 ml         Admission Weight  Weight: 79.4 kg (175 lb) (03/08/24 1332)     Daily Weight  03/08/24 : 78.5 kg (173 lb 1 oz)     Image Results  ECG 12 lead  Normal sinus rhythm  Possible Left atrial enlargement  Borderline ECG  When compared with ECG of 07-NOV-2019 09:50,  Nonspecific T wave abnormality now evident in Lateral leads     See ED provider note for full interpretation and clinical correlation     Confirmed by Brenda Small ([id number]) on 3/10/2024 12:07:46 PM        Physical Exam     Constitutional:       Appearance: She is ill-appearing.   HENT:      Nose: Congestion present.   Eyes:      Pupils: Pupils are equal, round, and reactive to light.   Cardiovascular:      Rate and Rhythm: Normal rate and regular rhythm.      Pulses: Normal pulses.   Pulmonary:      Effort: Pulmonary effort is normal.      Breath sounds: Rhonchi and rales present.   Abdominal:      General: Bowel sounds are normal.      Palpations: Abdomen is soft.      Tenderness: There is no abdominal tenderness.   Musculoskeletal:         General: No swelling or tenderness.   Skin:     General: Skin is dry.      Capillary Refill: Capillary refill takes less than 2 seconds.      Coloration: Skin is pale.   Neurological:      General: No focal deficit present.      Mental Status: She is alert.       Motor: Weakness present.   Psychiatric:         Mood and Affect: Mood normal.         Relevant Results              Results for orders placed or performed during the hospital encounter of 03/08/24 (from the past 24 hour(s))   POCT GLUCOSE   Result Value Ref Range     POCT Glucose 154 (H) 74 - 99 mg/dL   POCT GLUCOSE   Result Value Ref Range     POCT Glucose 142 (H) 74 - 99 mg/dL                           Assessment/Plan   Radha Mcmahon is a 58 y.o. 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                  Principal Problem:    Pneumonia, unspecified organism     Pneumonia  Sepsis present on admission  -CTA of the chest was done that did not show any evidence of pulmonary embolism but also showed left-sided volume loss with leftward mediastinal shift and sizable left pleural effusion.  Underlying infectious etiology is not excluded.  Also shows right middle lobe poor attachment on the right side that was not present previously that may be a focus of infection  -The patient was started on vancomycin Zosyn and azithromycin  On admission and will continue  -Patient with leukocytosis  blood cultures growing gram-positive cocci in chains.  Still awaiting urine strep and Legionella.  MRSA screen negative  - procalcitonin 0.10  -I was  consulted regarding sizable pleural effusion and possible bronchoscopy.  pulmonology are stating that they would not do a bronchoscopy at this point in time given that patient's current CT chest is  similar to CT chest from 2/15/2024.  Patient is not a good candidate for endobronchial stenting.  Also recommending that left pleural effusion tap could lead to potential increase in left-sided chest pain.  Continue antibiotics for pneumonia.  -  Patient will most likely need to follow-up with her  oncologist at [facility]     Chest pain  - most likely pleuritic in nature.   currently well-controlled. EKGs and  troponins with no significant change  -Echo from 2019 showed preserved ventricle ejection fraction with mild concentric left ventricular hypertrophy     COPD  SCC Lung cancer status post chemo and radiation with posttreatment findings on the left lung  -Not in COPD exacerbation at this time continue home medications once verified  -Continue follow-up at [facility] regarding lung cancer in outpatient settings     Hypokalemia  - potassium  repleted with 40 mEq potassium.  Rechecking BMP     Diabetes mellitus type 2  -Continue with sliding scale insulin ACHS Accu-Cheks and check A1c     Hypertension  -Continue with home amlodipine and continue with rest of antihypertensives as appropriate once verified     VTE prophylaxis-Lovenox

## 2024-03-11 ENCOUNTER — APPOINTMENT (OUTPATIENT)
Dept: RADIOLOGY | Facility: HOSPITAL | Age: 59
DRG: 193 | End: 2024-03-11
Payer: COMMERCIAL

## 2024-03-11 LAB
ANION GAP SERPL CALC-SCNC: 11 MMOL/L (ref 10–20)
BACTERIA BLD AEROBE CULT: ABNORMAL
BACTERIA BLD CULT: ABNORMAL
BUN SERPL-MCNC: 17 MG/DL (ref 6–23)
CALCIUM SERPL-MCNC: 8.4 MG/DL (ref 8.6–10.3)
CHLORIDE SERPL-SCNC: 107 MMOL/L (ref 98–107)
CO2 SERPL-SCNC: 25 MMOL/L (ref 21–32)
CREAT SERPL-MCNC: 2.47 MG/DL (ref 0.5–1.05)
EGFRCR SERPLBLD CKD-EPI 2021: 22 ML/MIN/1.73M*2
ERYTHROCYTE [DISTWIDTH] IN BLOOD BY AUTOMATED COUNT: 15.6 % (ref 11.5–14.5)
GLUCOSE BLD MANUAL STRIP-MCNC: 101 MG/DL (ref 74–99)
GLUCOSE BLD MANUAL STRIP-MCNC: 113 MG/DL (ref 74–99)
GLUCOSE BLD MANUAL STRIP-MCNC: 120 MG/DL (ref 74–99)
GLUCOSE SERPL-MCNC: 115 MG/DL (ref 74–99)
GRAM STN SPEC: ABNORMAL
HCT VFR BLD AUTO: 30.4 % (ref 36–46)
HGB BLD-MCNC: 9.9 G/DL (ref 12–16)
HOLD SPECIMEN: NORMAL
MCH RBC QN AUTO: 25.7 PG (ref 26–34)
MCHC RBC AUTO-ENTMCNC: 32.6 G/DL (ref 32–36)
MCV RBC AUTO: 79 FL (ref 80–100)
NRBC BLD-RTO: 0 /100 WBCS (ref 0–0)
PLATELET # BLD AUTO: 347 X10*3/UL (ref 150–450)
POTASSIUM SERPL-SCNC: 4.3 MMOL/L (ref 3.5–5.3)
RBC # BLD AUTO: 3.85 X10*6/UL (ref 4–5.2)
SODIUM SERPL-SCNC: 139 MMOL/L (ref 136–145)
WBC # BLD AUTO: 15.5 X10*3/UL (ref 4.4–11.3)

## 2024-03-11 PROCEDURE — 1200000002 HC GENERAL ROOM WITH TELEMETRY DAILY

## 2024-03-11 PROCEDURE — 2500000001 HC RX 250 WO HCPCS SELF ADMINISTERED DRUGS (ALT 637 FOR MEDICARE OP): Performed by: INTERNAL MEDICINE

## 2024-03-11 PROCEDURE — 76770 US EXAM ABDO BACK WALL COMP: CPT

## 2024-03-11 PROCEDURE — 85027 COMPLETE CBC AUTOMATED: CPT | Performed by: STUDENT IN AN ORGANIZED HEALTH CARE EDUCATION/TRAINING PROGRAM

## 2024-03-11 PROCEDURE — 2500000004 HC RX 250 GENERAL PHARMACY W/ HCPCS (ALT 636 FOR OP/ED)

## 2024-03-11 PROCEDURE — 2500000004 HC RX 250 GENERAL PHARMACY W/ HCPCS (ALT 636 FOR OP/ED): Performed by: HOSPITALIST

## 2024-03-11 PROCEDURE — 2500000004 HC RX 250 GENERAL PHARMACY W/ HCPCS (ALT 636 FOR OP/ED): Performed by: INTERNAL MEDICINE

## 2024-03-11 PROCEDURE — 2500000002 HC RX 250 W HCPCS SELF ADMINISTERED DRUGS (ALT 637 FOR MEDICARE OP, ALT 636 FOR OP/ED): Performed by: INTERNAL MEDICINE

## 2024-03-11 PROCEDURE — 82947 ASSAY GLUCOSE BLOOD QUANT: CPT

## 2024-03-11 PROCEDURE — 80048 BASIC METABOLIC PNL TOTAL CA: CPT | Performed by: STUDENT IN AN ORGANIZED HEALTH CARE EDUCATION/TRAINING PROGRAM

## 2024-03-11 PROCEDURE — 76770 US EXAM ABDO BACK WALL COMP: CPT | Performed by: RADIOLOGY

## 2024-03-11 PROCEDURE — 99232 SBSQ HOSP IP/OBS MODERATE 35: CPT | Performed by: HOSPITALIST

## 2024-03-11 RX ORDER — ENOXAPARIN SODIUM 100 MG/ML
30 INJECTION SUBCUTANEOUS EVERY 24 HOURS
Status: DISCONTINUED | OUTPATIENT
Start: 2024-03-11 | End: 2024-03-14 | Stop reason: HOSPADM

## 2024-03-11 RX ORDER — SODIUM CHLORIDE, SODIUM LACTATE, POTASSIUM CHLORIDE, CALCIUM CHLORIDE 600; 310; 30; 20 MG/100ML; MG/100ML; MG/100ML; MG/100ML
75 INJECTION, SOLUTION INTRAVENOUS CONTINUOUS
Status: DISCONTINUED | OUTPATIENT
Start: 2024-03-11 | End: 2024-03-14

## 2024-03-11 RX ADMIN — DEXTROSE MONOHYDRATE 2 G: 5 INJECTION INTRAVENOUS at 11:59

## 2024-03-11 RX ADMIN — SODIUM CHLORIDE, POTASSIUM CHLORIDE, SODIUM LACTATE AND CALCIUM CHLORIDE 100 ML/HR: 600; 310; 30; 20 INJECTION, SOLUTION INTRAVENOUS at 09:06

## 2024-03-11 RX ADMIN — AMLODIPINE BESYLATE 10 MG: 5 TABLET ORAL at 09:06

## 2024-03-11 RX ADMIN — AZITHROMYCIN 500 MG: 250 TABLET, FILM COATED ORAL at 09:06

## 2024-03-11 RX ADMIN — ENOXAPARIN SODIUM 30 MG: 30 INJECTION SUBCUTANEOUS at 19:32

## 2024-03-11 RX ADMIN — POLYETHYLENE GLYCOL 3350 17 G: 17 POWDER, FOR SOLUTION ORAL at 09:06

## 2024-03-11 RX ADMIN — HYDROCODONE BITARTRATE AND ACETAMINOPHEN 1 TABLET: 5; 325 TABLET ORAL at 13:04

## 2024-03-11 RX ADMIN — POTASSIUM CHLORIDE 20 MEQ: 1.5 POWDER, FOR SOLUTION ORAL at 21:53

## 2024-03-11 RX ADMIN — HYDROCODONE BITARTRATE AND ACETAMINOPHEN 1 TABLET: 5; 325 TABLET ORAL at 01:07

## 2024-03-11 RX ADMIN — POTASSIUM CHLORIDE 20 MEQ: 1.5 POWDER, FOR SOLUTION ORAL at 09:06

## 2024-03-11 RX ADMIN — HYDROCODONE BITARTRATE AND ACETAMINOPHEN 1 TABLET: 5; 325 TABLET ORAL at 07:07

## 2024-03-11 RX ADMIN — HYDROCODONE BITARTRATE AND ACETAMINOPHEN 1 TABLET: 5; 325 TABLET ORAL at 19:32

## 2024-03-11 RX ADMIN — SODIUM CHLORIDE 10 ML: 9 INJECTION, SOLUTION INTRAVENOUS at 06:16

## 2024-03-11 RX ADMIN — PIPERACILLIN SODIUM AND TAZOBACTAM SODIUM 3.38 G: 3; .375 INJECTION, SOLUTION INTRAVENOUS at 06:16

## 2024-03-11 ASSESSMENT — ENCOUNTER SYMPTOMS
AGITATION: 0
ADENOPATHY: 0
DIFFICULTY URINATING: 0
DIZZINESS: 0
ABDOMINAL DISTENTION: 0
ARTHRALGIAS: 1
SHORTNESS OF BREATH: 1
EYE DISCHARGE: 0
ACTIVITY CHANGE: 0

## 2024-03-11 ASSESSMENT — PAIN - FUNCTIONAL ASSESSMENT
PAIN_FUNCTIONAL_ASSESSMENT: 0-10

## 2024-03-11 ASSESSMENT — COGNITIVE AND FUNCTIONAL STATUS - GENERAL
WALKING IN HOSPITAL ROOM: A LITTLE
CLIMB 3 TO 5 STEPS WITH RAILING: A LITTLE
MOVING TO AND FROM BED TO CHAIR: A LITTLE
STANDING UP FROM CHAIR USING ARMS: A LITTLE
DRESSING REGULAR LOWER BODY CLOTHING: A LITTLE

## 2024-03-11 ASSESSMENT — PAIN DESCRIPTION - ORIENTATION
ORIENTATION: LEFT

## 2024-03-11 ASSESSMENT — PAIN SCALES - GENERAL
PAINLEVEL_OUTOF10: 7
PAINLEVEL_OUTOF10: 0 - NO PAIN
PAINLEVEL_OUTOF10: 6

## 2024-03-11 ASSESSMENT — PAIN DESCRIPTION - LOCATION
LOCATION: CHEST

## 2024-03-11 NOTE — CONSULTS
[facility] Nephrology  Consult Note           Reason for Consult:  whitney/atn  Requesting Physician:  Dr. Ricks    Chief Complaint:  shortness of breath  History Obtained From:  patient, electronic medical record    History of Present Ilness:    58 y.o. year old female admitted with shortness of breath.  Patient has underlying diabetes mellitus hypertension hyperlipidemia COPD and small cell cancer of the lung.  Finished chemo and radiation in 2020.  Was seen [facility] as her primary care but does not currently have a primary care doctor.  Was on several more medicines but she says she is only taking Farxiga, losartan HCTZ, Norvasc now as well as hydrocodone for pain.  She was admitted on 3/8 with shortness of breath.  She had CTA done that did not show pulmonary embolism but showed left-sided volume loss with mediastinal shift and sizable left pleural effusion.  Pulmonology has been consulted.  Has been on vancomycin and Zosyn.  Creatinine on 3/9 was still normal but then 3/10 creatinine was 2.3 and now 2.5.  She was not making much urine before.  Making more urine now.  She was on vancomycin and Zosyn.  Switched to Zithromax and ceftriaxone.  Started on LR.  Her urinalysis on 3/9 showed no red cells in the urine and no protein.  Also no signs of infection    Past Medical History:    History reviewed. No pertinent past medical history.     Past Surgical History:    History reviewed. No pertinent surgical history.     Home Medications:    No current facility-administered medications on file prior to encounter.     No current outpatient medications on file prior to encounter.       Allergies:  Morphine    Social History:    Social History     Socioeconomic History    Marital status:      Spouse name: Not on file    Number of children: Not on file    Years of education: Not on file    Highest education level: Not on file   Occupational History    Not on file   Tobacco Use    Smoking status: Former  "    Types: Cigarettes    Smokeless tobacco: Never   Substance and Sexual Activity    Alcohol use: Not on file    Drug use: Not on file    Sexual activity: Not on file   Other Topics Concern    Not on file   Social History Narrative    Not on file     Social Determinants of Health     Financial Resource Strain: Medium Risk (3/8/2024)    Overall Financial Resource Strain (CARDIA)     Difficulty of Paying Living Expenses: Somewhat hard   Food Insecurity: Not on file   Transportation Needs: No Transportation Needs (3/8/2024)    PRAPARE - Transportation     Lack of Transportation (Medical): No     Lack of Transportation (Non-Medical): No   Physical Activity: Not on file   Stress: Not on file   Social Connections: Not on file   Intimate Partner Violence: Not on file   Housing Stability: High Risk (3/8/2024)    Housing Stability Vital Sign     Unable to Pay for Housing in the Last Year: Yes     Number of Places Lived in the Last Year: 1     Unstable Housing in the Last Year: No       Family History:   No family history on file.    Review of Systems:   Review of Systems   Constitutional:  Negative for activity change.   HENT:  Negative for congestion.    Eyes:  Negative for discharge.   Respiratory:  Positive for shortness of breath.    Cardiovascular:  Negative for leg swelling.   Gastrointestinal:  Negative for abdominal distention.   Endocrine: Negative for cold intolerance.   Genitourinary:  Positive for decreased urine volume. Negative for difficulty urinating.   Musculoskeletal:  Positive for arthralgias.   Allergic/Immunologic: Negative for environmental allergies.   Neurological:  Negative for dizziness.   Hematological:  Negative for adenopathy.   Psychiatric/Behavioral:  Negative for agitation.          Physical exam:   Constitutional:  VITALS:  /57   Pulse 73   Temp 36.8 °C (98.2 °F)   Resp 18   Ht 1.549 m (5' 1\")   Wt 78.5 kg (173 lb 1 oz)   SpO2 97%   BMI 32.70 kg/m²      Wt Readings from Last 3 " Encounters:   03/08/24 78.5 kg (173 lb 1 oz)   01/28/24 83 kg (183 lb)      Gen: alert, awake, nad  Head: atraumatic, normocephalic  Skin: no rash, turgor wnl  Heent:  eomi, mmm  Neck: no bruits or jvd noted, thyroid normal  Lungs:  clear to auscultation  Heart:  regular rate and rhythm, no murmurs  Abdomen:  +bs, soft, nt, nd, no hepatosplenomegaly   Extremities: no edema  Psychiatric: mood and affect appropriate; judgement and insight intact  Musculoskeletal:  Rom, muscular strength intact; digits, nails normal    Data/  CBC:   Lab Results   Component Value Date    WBC 15.5 (H) 03/11/2024    RBC 3.85 (L) 03/11/2024    HGB 9.9 (L) 03/11/2024    HCT 30.4 (L) 03/11/2024    MCV 79 (L) 03/11/2024    MCH 25.7 (L) 03/11/2024    MCHC 32.6 03/11/2024    RDW 15.6 (H) 03/11/2024     03/11/2024     BMP:    Lab Results   Component Value Date     03/11/2024    K 4.3 03/11/2024     03/11/2024    CO2 25 03/11/2024    BUN 17 03/11/2024    CREATININE 2.47 (H) 03/11/2024    CALCIUM 8.4 (L) 03/11/2024    GLUCOSE 115 (H) 03/11/2024     ECG 12 lead  Normal sinus rhythm  Possible Left atrial enlargement  Borderline ECG  When compared with ECG of 07-NOV-2019 09:50,  Nonspecific T wave abnormality now evident in Lateral leads    See ED provider note for full interpretation and clinical correlation    Confirmed by Brenda Small ([id number]) on 3/10/2024 12:07:46 PM    LFT:   Lab Results   Component Value Date    AST 35 03/09/2024    ALT 32 03/09/2024    ALKPHOS 127 (H) 03/09/2024    BILITOT 0.8 03/09/2024      Urinalysis:   Lab Results   Component Value Date    STU 5.0 03/09/2024    PROTUR NEGATIVE 03/09/2024    GLUCOSEU NEGATIVE 03/09/2024    BLOODU SMALL (1+) (A) 03/09/2024    KETONESU NEGATIVE 03/09/2024    BILIRUBINU NEGATIVE 03/09/2024    NITRITEU NEGATIVE 03/09/2024    LEUKOCYTESU NEGATIVE 03/09/2024      Imaging: ECG 12 lead  Normal sinus rhythm  Possible Left atrial enlargement  Borderline ECG  When compared  with ECG of 07-NOV-2019 09:50,  Nonspecific T wave abnormality now evident in Lateral leads    See ED provider note for full interpretation and clinical correlation    Confirmed by Brenda Small ([id number]) on 3/10/2024 12:07:46 PM           Assessment/  58 y.o. yo female admitted with shortness of breath.  She has underlying diabetes mellitus and hypertension.  Has history of lung cancer status post chemo and radiation.  Has left-sided pleural effusion.  She was on losartan HCT, Farxiga and amlodipine at home.  Kidney function was normal on admission at 0.5 creatinine.  Developed acute kidney injury that appears to be ATN.  The ATN is either from the contrast versus effects of antibiotics vancomycin and Zosyn.  Also could be a combination of both.  Urinalysis is negative for any signs of glomerular disease.    Plan/  Antibiotics have already been changed.  She is on IV fluids  Renal ultrasound to rule out any obstructive uropathy  Expect kidney function to improve in the next couple days.  No need kidney function back to normal before discharge.  Just needed to be improving  We will have to decide on discharge which of her home meds including the losartan HCT as well as Farxiga should be restarted  Outpatient follow up from renal standpoint: [facility]    Thank you for the consultation.  Please do not hesitate to call with questions.    Alin Lundberg MD

## 2024-03-11 NOTE — PROGRESS NOTES
"PROGRESS NOTE    ASSESSMENT AND PLAN:   Ms. Mcmahon is a 58 year old with PMH of COPD, history of an abdominal wall soft tissue mass, which showed poorly differentiated carcinoma with squamous cell features, S/p concurrent chemo/RT with weekly carboplatin/paclitaxel completed 3/6/20 and maintenance durvalumab 4/29/21.     She was found to have bacteremia, strep pneumo.    Acute hypoxic respiratory failure  Multifocal pneumonia  History of poorly differentiated squamous cell cancer status post chemotherapy  Left-sided pleural effusion with mediastinal shift  -P/W worsening shortness of breath  -CT angio of chest shows no evidence of pulmonary embolism, it did show left-sided volume loss with mediastinal shift.  -Blood cultures positive for strep pneumo, urine antigen negative  -Discontinue vancomycin, switch Zosyn to ceftriaxone, continue azithromycin.  -Spoke with pulmonology, chest CT reviewed from [facility] on 2/15 which shows a stable effusion.  Currently not a candidate for endobronchial stenting.  Will need to follow-up with pulmonology outpatient.    Strep pneumo bacteremia  -4 out of 4 blood cultures on admission positive for strep pneumo bacteremia, most likely related to pneumonia.  -Patient does have a port in place, infectious disease consulted.    Acute kidney injury secondary to ATN  -Creatinine on admission 1.0, current creatinine 2.35  -Obtain renal ultrasound    Hypertension  -Continue amlodipine    Diabetes mellitus  -Continue low-dose sliding scale    Disposition: Discharge pending ID consult, will place PT OT evaluation.    SUBJECTIVE:   Admit Date: 3/8/2024    Interval History: Still feels out of breath, lethargic and tired.      OBJECTIVE:   Vitals: /57   Pulse 73   Temp 36.8 °C (98.2 °F)   Resp 18   Ht 1.549 m (5' 1\")   Wt 78.5 kg (173 lb 1 oz)   SpO2 97%   BMI 32.70 kg/m²    Wt Readings from Last 3 Encounters:   03/08/24 78.5 kg (173 lb 1 oz)   01/28/24 83 kg (183 lb)    " "  24HR INTAKE/OUTPUT:    Intake/Output Summary (Last 24 hours) at 3/11/2024 1213  Last data filed at 3/11/2024 0859  Gross per 24 hour   Intake 2322 ml   Output 1600 ml   Net 722 ml       PHYSICAL EXAM:   GENERAL: Laying in bed, does not appear to be in any distress.   HEENT: HEAD: Normocephalic atraumatic.  Neck: Supple.  Eyes: Pupils are reactive to direct light.   CVS: S1, S2 heard. Regular rate and rhythm  LUNGS: Coarse breath sounds appreciated bilaterally.  ABDOMEN: Soft, nontender to palpate. Positive bowel sounds. No guarding or rebound appreciated.  NEUROLOGICAL: No focal neurological deficits appreciated. Cranial nerves are grossly intact.  EXTREMITIES: No edema appreciated.  SKIN:  Grossly intact, warm and dry.      LABS/IMAGING AND MEDICATIONS:   Scheduled Meds:amLODIPine, 10 mg, oral, Daily  azithromycin, 500 mg, oral, q24h CHENG  cefTRIAXone, 2 g, intravenous, q24h  enoxaparin, 40 mg, subcutaneous, q24h  insulin lispro, 0-5 Units, subcutaneous, TID with meals  polyethylene glycol, 17 g, oral, Daily  potassium chloride, 20 mEq, oral, BID  sodium chloride, 10 mL, intravenous, q8h      PRN Meds:PRN medications: acetaminophen, dextrose 10 % in water (D10W), dextrose, glucagon, HYDROcodone-acetaminophen, vancomycin    No lab exists for component: \"CBC\"   No lab exists for component: \"CMP\"   No lab exists for component: \"TROPONIN\"  Results from last 7 days   Lab Units 03/08/24  1411   BNP pg/mL 128*     Results from last 7 days   Lab Units 03/08/24  1411   INR  1.6*     No lab exists for component: \"LIPIDS\"       No lab exists for component: \"URINALYSIS\"          BMP:  Results from last 7 days   Lab Units 03/11/24  0640 03/10/24  1619 03/09/24  0523   SODIUM mmol/L 139 139 137   POTASSIUM mmol/L 4.3 3.9 3.4*   CHLORIDE mmol/L 107 105 102   CO2 mmol/L 25 25 26   BUN mg/dL 17 17 6   CREATININE mg/dL 2.47* 2.29* 0.54       CBC:  Results from last 7 days   Lab Units 03/11/24  0640 03/10/24  1619 03/09/24  0523 " "  WBC AUTO x10*3/uL 15.5* 20.9* 22.1*   RBC AUTO x10*6/uL 3.85* 3.93* 3.88*   HEMOGLOBIN g/dL 9.9* 10.3* 10.1*   HEMATOCRIT % 30.4* 31.7* 31.6*   MCV fL 79* 81 81   MCH pg 25.7* 26.2 26.0   MCHC g/dL 32.6 32.5 32.0   RDW % 15.6* 15.7* 15.8*   PLATELETS AUTO x10*3/uL 347 331 300       Cardiac Enzymes:   Results from last 7 days   Lab Units 03/08/24  1549 03/08/24  1411   TROPHS ng/L 11 12         Hepatic Function Panel:  Results from last 7 days   Lab Units 03/09/24  0523 03/08/24  2221 03/08/24  1411   ALK PHOS U/L 127* 100 96   ALT U/L 32 29 27   AST U/L 35 31 25   PROTEIN TOTAL g/dL 6.5 6.5 7.5   BILIRUBIN TOTAL mg/dL 0.8 0.6 0.4       Magnesium:  Results from last 7 days   Lab Units 03/08/24  1411   MAGNESIUM mg/dL 1.73       Pro-BNP:  No results found for: \"PROBNP\"    INR:  Results from last 7 days   Lab Units 03/08/24  1411   PROTIME seconds 18.3*   INR  1.6*       TSH:  No results found for: \"TSH\"    Lipid Profile:        No lab exists for component: \"LABVLDL\"    HgbA1C:        Lactate Level:  No lab exists for component: \"LACTA\"    CMP:  Results from last 7 days   Lab Units 03/11/24  0640 03/10/24  1619 03/09/24  0523 03/08/24  2221 03/08/24  1411   SODIUM mmol/L 139 139 137 136 138   POTASSIUM mmol/L 4.3 3.9 3.4* 3.0* 3.2*   CHLORIDE mmol/L 107 105 102 103 101   CO2 mmol/L 25 25 26 27 27   BUN mg/dL 17 17 6 5* 8   CREATININE mg/dL 2.47* 2.29* 0.54 0.53 0.51   GLUCOSE mg/dL 115* 113* 98 122* 119*   CALCIUM mg/dL 8.4* 8.5* 8.1* 7.7* 8.7   PROTEIN TOTAL g/dL  --   --  6.5 6.5 7.5   BILIRUBIN TOTAL mg/dL  --   --  0.8 0.6 0.4   ALK PHOS U/L  --   --  127* 100 96   AST U/L  --   --  35 31 25   ALT U/L  --   --  32 29 27       Amylase:        Lipase:  Results from last 7 days   Lab Units 03/08/24  1411   LIPASE U/L 6*       ABG:        No lab exists for component: \"PO2\", \"PCO2\", \"HCO3\", \"BE\", \"O2SAT\"       "

## 2024-03-11 NOTE — PROGRESS NOTES
"Radha Mcmahon is a 58 y.o. female on day 3 of admission presenting with Pneumonia, unspecified organism.    SUBJECTIVE:   Admit Date: 3/8/2024                Interval History: Still feels out of breath, lethargic and tired.        OBJECTIVE:   Vitals: /57   Pulse 73   Temp 36.8 °C (98.2 °F)   Resp 18   Ht 1.549 m (5' 1\")   Wt 78.5 kg (173 lb 1 oz)   SpO2 97%   BMI 32.70 kg/m²        Wt Readings from Last 3 Encounters:   03/08/24 78.5 kg (173 lb 1 oz)   01/28/24 83 kg (183 lb)      24HR INTAKE/OUTPUT:    Intake/Output Summary (Last 24 hours) at 3/11/2024 1213  Last data filed at 3/11/2024 0859      Gross per 24 hour   Intake 2322 ml   Output 1600 ml   Net 722 ml         PHYSICAL EXAM:   GENERAL: Laying in bed, does not appear to be in any distress.   HEENT: HEAD: Normocephalic atraumatic.  Neck: Supple.  Eyes: Pupils are reactive to direct light.   CVS: S1, S2 heard. Regular rate and rhythm  LUNGS: Coarse breath sounds appreciated bilaterally.  ABDOMEN: Soft, nontender to palpate. Positive bowel sounds. No guarding or rebound appreciated.  NEUROLOGICAL: No focal neurological deficits appreciated. Cranial nerves are grossly intact.  EXTREMITIES: No edema appreciated.  SKIN:  Grossly intact, warm and dry.        LABS/IMAGING AND MEDICATIONS:   Scheduled Meds:amLODIPine, 10 mg, oral, Daily  azithromycin, 500 mg, oral, q24h CHENG  cefTRIAXone, 2 g, intravenous, q24h  enoxaparin, 40 mg, subcutaneous, q24h  insulin lispro, 0-5 Units, subcutaneous, TID with meals  polyethylene glycol, 17 g, oral, Daily  potassium chloride, 20 mEq, oral, BID  sodium chloride, 10 mL, intravenous, q8h        PRN Meds:PRN medications: acetaminophen, dextrose 10 % in water (D10W), dextrose, glucagon, HYDROcodone-acetaminophen, vancomycin     No lab exists for component: \"CBC\"   No lab exists for component: \"CMP\"   No lab exists for component: \"TROPONIN\"       Results from last 7 days   Lab Units 03/08/24  1411   BNP pg/mL " "128*           Results from last 7 days   Lab Units 03/08/24  1411   INR   1.6*      No lab exists for component: \"LIPIDS\"       No lab exists for component: \"URINALYSIS\"              BMP:         Results from last 7 days   Lab Units 03/11/24  0640 03/10/24  1619 03/09/24  0523   SODIUM mmol/L 139 139 137   POTASSIUM mmol/L 4.3 3.9 3.4*   CHLORIDE mmol/L 107 105 102   CO2 mmol/L 25 25 26   BUN mg/dL 17 17 6   CREATININE mg/dL 2.47* 2.29* 0.54         CBC:         Results from last 7 days   Lab Units 03/11/24  0640 03/10/24  1619 03/09/24  0523   WBC AUTO x10*3/uL 15.5* 20.9* 22.1*   RBC AUTO x10*6/uL 3.85* 3.93* 3.88*   HEMOGLOBIN g/dL 9.9* 10.3* 10.1*   HEMATOCRIT % 30.4* 31.7* 31.6*   MCV fL 79* 81 81   MCH pg 25.7* 26.2 26.0   MCHC g/dL 32.6 32.5 32.0   RDW % 15.6* 15.7* 15.8*   PLATELETS AUTO x10*3/uL 347 331 300         Cardiac Enzymes:         Results from last 7 days   Lab Units 03/08/24  1549 03/08/24  1411   TROPHS ng/L 11 12            Hepatic Function Panel:         Results from last 7 days   Lab Units 03/09/24  0523 03/08/24  2221 03/08/24  1411   ALK PHOS U/L 127* 100 96   ALT U/L 32 29 27   AST U/L 35 31 25   PROTEIN TOTAL g/dL 6.5 6.5 7.5   BILIRUBIN TOTAL mg/dL 0.8 0.6 0.4         Magnesium:       Results from last 7 days   Lab Units 03/08/24  1411   MAGNESIUM mg/dL 1.73         Pro-BNP:  No results found for: \"PROBNP\"     INR:       Results from last 7 days   Lab Units 03/08/24  1411   PROTIME seconds 18.3*   INR   1.6*         TSH:  No results found for: \"TSH\"     Lipid Profile:         No lab exists for component: \"LABVLDL\"     HgbA1C:         Lactate Level:  No lab exists for component: \"LACTA\"     CMP:           Results from last 7 days   Lab Units 03/11/24  0640 03/10/24  1619 03/09/24  0523 03/08/24  2221 03/08/24  1411   SODIUM mmol/L 139 139 137 136 138   POTASSIUM mmol/L 4.3 3.9 3.4* 3.0* 3.2*   CHLORIDE mmol/L 107 105 102 103 101   CO2 mmol/L 25 25 26 27 27   BUN mg/dL 17 17 6 5* 8 "   CREATININE mg/dL 2.47* 2.29* 0.54 0.53 0.51   GLUCOSE mg/dL 115* 113* 98 122* 119*   CALCIUM mg/dL 8.4* 8.5* 8.1* 7.7* 8.7   PROTEIN TOTAL g/dL  --   --  6.5 6.5 7.5   BILIRUBIN TOTAL mg/dL  --   --  0.8 0.6 0.4   ALK PHOS U/L  --   --  127* 100 96   AST U/L  --   --  35 31 25   ALT U/L  --   --  32 29 27         Amylase:         Lipase:       Results from last 7 days   Lab Units 03/08/24  1411   LIPASE U/L 6*   ASSESSMENT AND PLAN:   Ms. Mcmahon is a 58 year old with PMH of COPD, history of an abdominal wall soft tissue mass, which showed poorly differentiated carcinoma with squamous cell features, S/p concurrent chemo/RT with weekly carboplatin/paclitaxel completed 3/6/20 and maintenance durvalumab 4/29/21.      She was found to have bacteremia, strep pneumo.     Acute hypoxic respiratory failure  Multifocal pneumonia  History of poorly differentiated squamous cell cancer status post chemotherapy  Left-sided pleural effusion with mediastinal shift  -P/W worsening shortness of breath  -CT angio of chest shows no evidence of pulmonary embolism, it did show left-sided volume loss with mediastinal shift.  -Blood cultures positive for strep pneumo, urine antigen negative  -Discontinue vancomycin, switch Zosyn to ceftriaxone, continue azithromycin.  -I D/W Dr. Lal who reviewed CT Chest on this admission and prior CT Chest report  from [facility] on 2/15 which shows a stable effusion.  Currently not a candidate for endobronchial stenting and there is no sizable pleural effusion to tap.  Will need to follow-up with [facility] pulmonology/Oncology  outpatient.     Strep pneumo bacteremia  -4 out of 4 blood cultures on admission positive for strep pneumo bacteremia, most likely related to pneumonia.  -Patient does have a port in place, infectious disease consulted.     Acute kidney injury secondary to ATN  -Creatinine on admission 1.0, current creatinine 2.35  -Obtain renal ultrasound     Hypertension  -Continue  amlodipine     Diabetes mellitus  -Continue low-dose sliding scale     Disposition: Discharge pending ID consult, will place PT OT evaluation.            Boo Moran MD

## 2024-03-11 NOTE — PROGRESS NOTES
"Vancomycin Dosing by Pharmacy- FOLLOW UP    Radha Mcmahon is a 58 y.o. year old female who Pharmacy has been consulted for vancomycin dosing for pneumonia. Based on the patient's indication and renal status this patient is being dosed based on a goal AUC of 400-600 changing to traditional dosing.     Renal function is currently declining. Notified by Marquise Oh RN that declining Baseline Scr on admit was 0.51, CrCl 113.3 ml/min.    Current vancomycin dose: 1250 mg given every 12 hours, last dose given 1827 3/9/24    Estimated vancomycin AUC on current dose: 1600 mg/L.hr  will discontinue and dose by level    Visit Vitals  /62 (Patient Position: Lying)   Pulse 75   Temp 36.7 °C (98.1 °F) (Temporal)   Resp 18        Lab Results   Component Value Date    CREATININE 2.29 (H) 03/10/2024    CREATININE 0.54 03/09/2024    CREATININE 0.53 03/08/2024    CREATININE 0.51 03/08/2024        Patient weight is No results found for: \"PTWEIGHT\"    No results found for: \"CULTURE\"     I/O last 3 completed shifts:  In: 1043.5 (13.3 mL/kg) [P.O.:200; I.V.:50 (0.6 mL/kg); IV Piggyback:793.5]  Out: 800 (10.2 mL/kg) [Urine:800 (0.3 mL/kg/hr)]  Weight: 78.5 kg   [unfilled]    No results found for: \"PATIENTTEMP\"     Assessment/Plan    Will dose by Level    The next level will be obtained on 3/11/24 at 1700. May be obtained sooner if clinically indicated.   Will continue to monitor renal function daily while on vancomycin and order serum creatinine at least every 48 hours if not already ordered.  Follow for continued vancomycin needs, clinical response, and signs/symptoms of toxicity.       Trung Elise, PharmD           "

## 2024-03-11 NOTE — PROGRESS NOTES
"Nutrition Note:   Nutrition Assessment    Reason for Assessment: Admission nursing screening    Patient is a 58 y.o. female presenting with pneumonia.   RDN consult for MST score of 2. Met with pt who denies recent wt changes, states  lbs. Pt reports appetite os 'OK\", denies any recent changes in appetite. Pt denies N/V/C/D, denies chewing or swallowing difficulty. Pt denies nutrition concerns at this time. RDN to sign off for now, please re-consult as needed.              Time Spent/Follow-up Reminder:   Time Spent (min): 15 minutes  Last Date of Nutrition Visit: 03/11/24  Nutrition Follow-Up Needed?: Dietitian to reassess per policy  Follow up Comment: no MST  "

## 2024-03-11 NOTE — PROGRESS NOTES
03/11/24 1219   Discharge Planning   Living Arrangements Alone   Support Systems Children   Assistance Needed independent   Type of Residence Private residence   Home or Post Acute Services None   Patient expects to be discharged to: home   Does the patient need discharge transport arranged? No     Chart reviewed, writer spoke with patient- introduced self and explained role. Patient is alert and oriented x3. She was sitting up in bed finishing her lunch. Patient lives alone in a high rise apt with elevator. Prior level of functioning independent. Independent with adls and iadls. She was driving. Patient wears oxygen prn at home. She reports pcp is Dr. Galindo but has not been able to get an appt with him and is considering another pcp as he is booked out. Writer provided a pcp list. Patient denied any issues with transportation, obtaining medications, meals or groceries. Patient at this time plans to return home and is declining any needs. She identified children involved and supportive. Care Transition team to follow and assist as needed. ALISIA Crystal

## 2024-03-11 NOTE — DOCUMENTATION CLARIFICATION NOTE
PATIENT:               MATHIEU GONZALEZ  ACCT #:                  [id number]  MRN:                       [id number]  :                       1965  ADMIT DATE:       3/8/2024 1:26 PM  DISCH DATE:  RESPONDING PROVIDER #:        [id number]          PROVIDER RESPONSE TEXT:    Sepsis with acute organ dysfunction of ACE, not POA,  ruled in for this admission    CDI QUERY TEXT:    [facility]_Diagnosis Ruled Out In      Instruction:    Based on your assessment of the patient and the clinical information, please provide the requested documentation by clicking on the appropriate radio button and enter any additional information if prompted.    Question: Please further clarify the diagnosis of Sepsis as    When answering this query, please exercise your independent professional judgment. The fact that a question is being asked, does not imply that any particular answer is desired or expected.    The patient's clinical indicators include:  Clinical Information: 58 yof with pneumonia with documented sepsis admitted with creatinine WNL who developed ACE with ATN with serum evidence on 3/10    Clinical Indicators:    3/11 Dr Ricks: Bacteremia    3/8 Blood culture: Streptococcus pneumoniae    3/10 Dr Moran: Sepsis present on admission    s/p use if IV contrast on 3/8 during CT Chest    Serum creatinine: 0.51 (3/8)/0.53 (3/8)/0.54 (3/9)/2.29 (3/10)/2.47 (3/11)    Treatment: Tylenol, Azithromycin, Rocephin, Vancomycin, IVF, oxygen, VS, labs, ID/Pulmonary/Nephrology consults    Risk Factors: 58 yof admit with infection with left lung collapse with loculated pleural effusion with hx of lung cancer, s/p use if IV contrast on 3/8 during CT  Options provided:  -- Sepsis ruled out after workup  -- Sepsis with acute organ dysfunction of ACE, not POA, ruled in for this admission  -- Sepsis with other associated acute organ dysfunction, Please specify additional information below  -- Other - I will add my own diagnosis  --  Refer to Clinical Documentation Reviewer    Query created by: Juany Angulo on 3/11/2024 1:36 PM      Electronically signed by:  COLETTE LO MD 3/11/2024 1:51 PM

## 2024-03-11 NOTE — DOCUMENTATION CLARIFICATION NOTE
"    PATIENT:               MATHIEU GONZALEZ  ACCT #:                  [id number]  MRN:                       [id number]  :                       1965  ADMIT DATE:       3/8/2024 1:26 PM  DISCH DATE:  RESPONDING PROVIDER #:        [id number]          PROVIDER RESPONSE TEXT:    Streptococcus pneumoniae pneumonia    CDI QUERY TEXT:    [facility]_Pneumonia Specificity    Instruction:    Based on your assessment of the patient and the clinical information, please provide the requested documentation by clicking on the appropriate radio button and enter any additional information if prompted.    Question: Please further specify the type of pneumonia being treated    When answering this query, please exercise your independent professional judgment. The fact that a question is being asked, does not imply that any particular answer is desired or expected.    The patient's clinical indicators include:  Clinical Information: 58 yof with multifocal pneumonia    Clinical Indicators:    3/9 MRSA negative    3/8 Blood culture: Streptococcus pneumoniae    3/8 CXR: \"FINDINGS:  There is near complete opacification of the left hemithorax, with  minimal aeration of the upper lobe. This would indicate consolidating  infiltrate, including possibly postobstructive from underlying mass  or neoplasm, with volume loss as there shift of the heart mediastinum  to the left. However, there may also be an effusion. Follow-up or CT  would be recommended. The right lung is clear in the right pulmonary  vasculature unremarkable. The previous chest film demonstrated  opacification suspicious for a mass in the upper lung medially.\"    3/8 CT Chest:  \"IMPRESSION:  No CT evidence of pulmonary embolism in the current exam.    There is left-sided volume loss with leftward mediastinal shift.  Sizable left pleural effusion. Atelectasis/consolidation of the left  lung with mild-to-moderate associated bronchiectasis. Underlying  infectious etiology is not " "excluded. Consider bronchoscopy in  follow-up.    Mild nonspecific mediastinal and right hilar adenopathy.    Small area linear and somewhat nodular infiltrative density with  pleural attachment anteriorly in the mid  right upper lobe, not  present previously. This could be a small focus of infection.  Interval development of an area of pleural and parenchymal scarring  is the main differential consideration.\"    Treatment: Azithromycin, Rocephin, Pulmonary consult, ID consult    Risk Factors: 58 yof with hx of lung cancer, COPD  Options provided:  -- Gram Negative PNA  -- Streptococcus pneumoniae pneumonia  -- Other - I will add my own diagnosis  -- Refer to Clinical Documentation Reviewer    Query created by: Juany Angulo on 3/11/2024 2:09 PM      Electronically signed by:  COLETTE LO MD 3/11/2024 2:27 PM          "

## 2024-03-11 NOTE — PROGRESS NOTES
Vancomycin Dosing by Pharmacy- Cessation of Therapy    Consult to pharmacy for vancomycin dosing has been discontinued by the prescriber, pharmacy will sign off at this time.    Please call pharmacy if there are further questions or re-enter a consult if vancomycin is resumed.     Brenda Tavares, [facility]

## 2024-03-12 LAB
ALBUMIN SERPL BCP-MCNC: 2.9 G/DL (ref 3.4–5)
ANION GAP SERPL CALC-SCNC: 13 MMOL/L (ref 10–20)
BASOPHILS # BLD AUTO: 0.07 X10*3/UL (ref 0–0.1)
BASOPHILS NFR BLD AUTO: 0.5 %
BUN SERPL-MCNC: 22 MG/DL (ref 6–23)
CALCIUM SERPL-MCNC: 9.2 MG/DL (ref 8.6–10.3)
CHLORIDE SERPL-SCNC: 107 MMOL/L (ref 98–107)
CO2 SERPL-SCNC: 26 MMOL/L (ref 21–32)
CREAT SERPL-MCNC: 2.61 MG/DL (ref 0.5–1.05)
EGFRCR SERPLBLD CKD-EPI 2021: 21 ML/MIN/1.73M*2
EOSINOPHIL # BLD AUTO: 0.22 X10*3/UL (ref 0–0.7)
EOSINOPHIL NFR BLD AUTO: 1.5 %
ERYTHROCYTE [DISTWIDTH] IN BLOOD BY AUTOMATED COUNT: 15.5 % (ref 11.5–14.5)
GLUCOSE BLD MANUAL STRIP-MCNC: 94 MG/DL (ref 74–99)
GLUCOSE SERPL-MCNC: 93 MG/DL (ref 74–99)
HCT VFR BLD AUTO: 31.9 % (ref 36–46)
HGB BLD-MCNC: 10.5 G/DL (ref 12–16)
HOLD SPECIMEN: NORMAL
IMM GRANULOCYTES # BLD AUTO: 0.28 X10*3/UL (ref 0–0.7)
IMM GRANULOCYTES NFR BLD AUTO: 1.9 % (ref 0–0.9)
LYMPHOCYTES # BLD AUTO: 1.71 X10*3/UL (ref 1.2–4.8)
LYMPHOCYTES NFR BLD AUTO: 11.4 %
MAGNESIUM SERPL-MCNC: 2.11 MG/DL (ref 1.6–2.4)
MCH RBC QN AUTO: 25.7 PG (ref 26–34)
MCHC RBC AUTO-ENTMCNC: 32.9 G/DL (ref 32–36)
MCV RBC AUTO: 78 FL (ref 80–100)
MONOCYTES # BLD AUTO: 1.96 X10*3/UL (ref 0.1–1)
MONOCYTES NFR BLD AUTO: 13 %
NEUTROPHILS # BLD AUTO: 10.82 X10*3/UL (ref 1.2–7.7)
NEUTROPHILS NFR BLD AUTO: 71.7 %
NRBC BLD-RTO: 0 /100 WBCS (ref 0–0)
PHOSPHATE SERPL-MCNC: 3.7 MG/DL (ref 2.5–4.9)
PLATELET # BLD AUTO: 415 X10*3/UL (ref 150–450)
POTASSIUM SERPL-SCNC: 4.7 MMOL/L (ref 3.5–5.3)
RBC # BLD AUTO: 4.08 X10*6/UL (ref 4–5.2)
SODIUM SERPL-SCNC: 141 MMOL/L (ref 136–145)
WBC # BLD AUTO: 15.1 X10*3/UL (ref 4.4–11.3)

## 2024-03-12 PROCEDURE — 85025 COMPLETE CBC W/AUTO DIFF WBC: CPT | Performed by: INTERNAL MEDICINE

## 2024-03-12 PROCEDURE — 97165 OT EVAL LOW COMPLEX 30 MIN: CPT | Mod: GO

## 2024-03-12 PROCEDURE — 2500000004 HC RX 250 GENERAL PHARMACY W/ HCPCS (ALT 636 FOR OP/ED): Performed by: HOSPITALIST

## 2024-03-12 PROCEDURE — 2500000001 HC RX 250 WO HCPCS SELF ADMINISTERED DRUGS (ALT 637 FOR MEDICARE OP): Performed by: INTERNAL MEDICINE

## 2024-03-12 PROCEDURE — 2500000002 HC RX 250 W HCPCS SELF ADMINISTERED DRUGS (ALT 637 FOR MEDICARE OP, ALT 636 FOR OP/ED): Performed by: INTERNAL MEDICINE

## 2024-03-12 PROCEDURE — 1200000002 HC GENERAL ROOM WITH TELEMETRY DAILY

## 2024-03-12 PROCEDURE — 97161 PT EVAL LOW COMPLEX 20 MIN: CPT | Mod: GP | Performed by: PHYSICAL THERAPIST

## 2024-03-12 PROCEDURE — 82947 ASSAY GLUCOSE BLOOD QUANT: CPT

## 2024-03-12 PROCEDURE — 83735 ASSAY OF MAGNESIUM: CPT | Performed by: HOSPITALIST

## 2024-03-12 PROCEDURE — 80069 RENAL FUNCTION PANEL: CPT | Performed by: INTERNAL MEDICINE

## 2024-03-12 PROCEDURE — 99232 SBSQ HOSP IP/OBS MODERATE 35: CPT | Performed by: HOSPITALIST

## 2024-03-12 RX ADMIN — DEXTROSE MONOHYDRATE 2 G: 5 INJECTION INTRAVENOUS at 12:48

## 2024-03-12 RX ADMIN — HYDROCODONE BITARTRATE AND ACETAMINOPHEN 1 TABLET: 5; 325 TABLET ORAL at 02:06

## 2024-03-12 RX ADMIN — HYDROCODONE BITARTRATE AND ACETAMINOPHEN 1 TABLET: 5; 325 TABLET ORAL at 14:27

## 2024-03-12 RX ADMIN — AMLODIPINE BESYLATE 10 MG: 5 TABLET ORAL at 08:20

## 2024-03-12 RX ADMIN — SODIUM CHLORIDE, POTASSIUM CHLORIDE, SODIUM LACTATE AND CALCIUM CHLORIDE 50 ML/HR: 600; 310; 30; 20 INJECTION, SOLUTION INTRAVENOUS at 00:16

## 2024-03-12 RX ADMIN — HYDROCODONE BITARTRATE AND ACETAMINOPHEN 1 TABLET: 5; 325 TABLET ORAL at 08:20

## 2024-03-12 RX ADMIN — AZITHROMYCIN 500 MG: 250 TABLET, FILM COATED ORAL at 08:20

## 2024-03-12 RX ADMIN — HYDROCODONE BITARTRATE AND ACETAMINOPHEN 1 TABLET: 5; 325 TABLET ORAL at 20:46

## 2024-03-12 ASSESSMENT — COGNITIVE AND FUNCTIONAL STATUS - GENERAL
DAILY ACTIVITIY SCORE: 24
CLIMB 3 TO 5 STEPS WITH RAILING: A LITTLE
MOBILITY SCORE: 23
CLIMB 3 TO 5 STEPS WITH RAILING: A LITTLE
MOBILITY SCORE: 23
DAILY ACTIVITIY SCORE: 24

## 2024-03-12 ASSESSMENT — PAIN SCALES - GENERAL
PAINLEVEL_OUTOF10: 6
PAINLEVEL_OUTOF10: 0 - NO PAIN
PAINLEVEL_OUTOF10: 7
PAINLEVEL_OUTOF10: 8
PAINLEVEL_OUTOF10: 6
PAINLEVEL_OUTOF10: 7
PAINLEVEL_OUTOF10: 9

## 2024-03-12 ASSESSMENT — PAIN DESCRIPTION - ORIENTATION
ORIENTATION: LEFT
ORIENTATION: LEFT

## 2024-03-12 ASSESSMENT — PAIN - FUNCTIONAL ASSESSMENT
PAIN_FUNCTIONAL_ASSESSMENT: 0-10

## 2024-03-12 ASSESSMENT — ACTIVITIES OF DAILY LIVING (ADL): BATHING_ASSISTANCE: MODIFIED INDEPENDENT (DEVICE)

## 2024-03-12 ASSESSMENT — PAIN DESCRIPTION - LOCATION
LOCATION: CHEST
LOCATION: CHEST

## 2024-03-12 NOTE — CARE PLAN
The patient's goals for the shift include      The clinical goals for the shift include Decrease SOB anad left chest pain    Problem: Pain  Goal: My pain/discomfort is manageable  Outcome: Progressing     Problem: Safety  Goal: Patient will be injury free during hospitalization  Outcome: Progressing  Goal: I will remain free of falls  Outcome: Progressing     Problem: Safety  Goal: I will remain free of falls  Outcome: Progressing     Problem: Daily Care  Goal: Daily care needs are met  Outcome: Progressing

## 2024-03-12 NOTE — PROGRESS NOTES
"PROGRESS NOTE    ASSESSMENT AND PLAN:   Ms. Mcmahon is a 58 year old with PMH of COPD, history of an abdominal wall soft tissue mass, which showed poorly differentiated carcinoma with squamous cell features, S/p concurrent chemo/RT with weekly carboplatin/paclitaxel completed 3/6/20 and maintenance durvalumab 4/29/21.      She was found to have bacteremia, strep pneumo.     Acute hypoxic respiratory failure  Multifocal pneumonia  History of poorly differentiated squamous cell cancer status post chemotherapy  Left-sided pleural effusion with mediastinal shift  -P/W worsening shortness of breath  -CT angio of chest shows no evidence of pulmonary embolism, it did show left-sided volume loss with mediastinal shift.  -Blood cultures positive for strep pneumo, urine antigen negative  -Discontinue vancomycin, switch Zosyn to ceftriaxone, continue azithromycin.  -Spoke with pulmonology, chest CT reviewed from [facility] on 2/15 which shows a stable effusion.  Currently not a candidate for endobronchial stenting.  Will need to follow-up with pulmonology outpatient.     Strep pneumo bacteremia  -4 out of 4 blood cultures on admission positive for strep pneumo bacteremia, most likely related to pneumonia.  -Patient does have a port in place, infectious disease consulted.     Acute kidney injury secondary to ATN  -Creatinine on admission 1.0, current creatinine 2.61  -Ultrasound shows no evidence of hydronephrosis.     Hypertension  -Continue amlodipine     Diabetes mellitus  -Continue low-dose sliding scale     Disposition: Once creatinine stabilizes, can be potentially discharged.      SUBJECTIVE:   Admit Date: 3/8/2024    Interval History: Complains of chest pain at night particularly related to her cough.  Denies any other symptoms.      OBJECTIVE:   Vitals: /74 (BP Location: Right arm, Patient Position: Lying)   Pulse 70   Temp 36.5 °C (97.7 °F) (Temporal)   Resp 18   Ht 1.549 m (5' 1\")   Wt 78.5 kg (173 lb " "1 oz)   SpO2 92%   BMI 32.70 kg/m²    Wt Readings from Last 3 Encounters:   03/08/24 78.5 kg (173 lb 1 oz)   01/28/24 83 kg (183 lb)      24HR INTAKE/OUTPUT:    Intake/Output Summary (Last 24 hours) at 3/12/2024 1249  Last data filed at 3/12/2024 0836  Gross per 24 hour   Intake 1985 ml   Output --   Net 1985 ml       PHYSICAL EXAM:   GENERAL: Laying in bed, does not appear to be in any distress.   HEENT: HEAD: Normocephalic atraumatic.  Neck: Supple.  Eyes: Pupils are reactive to direct light.   CVS: S1, S2 heard. Regular rate and rhythm  LUNGS: Coarse breath sounds appreciated.  No wheezing or rhonchi appreciated.  ABDOMEN: Soft, nontender to palpate. Positive bowel sounds. No guarding or rebound appreciated.  NEUROLOGICAL: No focal neurological deficits appreciated. Cranial nerves are grossly intact.  EXTREMITIES: No edema appreciated.  SKIN:  Grossly intact, warm and dry.      LABS/IMAGING AND MEDICATIONS:   Scheduled Meds:amLODIPine, 10 mg, oral, Daily  azithromycin, 500 mg, oral, q24h CHENG  cefTRIAXone, 2 g, intravenous, q24h  enoxaparin, 30 mg, subcutaneous, q24h  insulin lispro, 0-5 Units, subcutaneous, TID with meals  polyethylene glycol, 17 g, oral, Daily  sodium chloride, 10 mL, intravenous, q8h      PRN Meds:PRN medications: acetaminophen, dextrose 10 % in water (D10W), dextrose, glucagon, HYDROcodone-acetaminophen    No lab exists for component: \"CBC\"   No lab exists for component: \"CMP\"   No lab exists for component: \"TROPONIN\"  Results from last 7 days   Lab Units 03/08/24  1411   BNP pg/mL 128*     Results from last 7 days   Lab Units 03/08/24  1411   INR  1.6*     No lab exists for component: \"LIPIDS\"       No lab exists for component: \"URINALYSIS\"          BMP:  Results from last 7 days   Lab Units 03/12/24  0522 03/11/24  0640 03/10/24  1619   SODIUM mmol/L 141 139 139   POTASSIUM mmol/L 4.7 4.3 3.9   CHLORIDE mmol/L 107 107 105   CO2 mmol/L 26 25 25   BUN mg/dL 22 17 17   CREATININE mg/dL 2.61* " "2.47* 2.29*       CBC:  Results from last 7 days   Lab Units 03/12/24  0522 03/11/24  0640 03/10/24  1619   WBC AUTO x10*3/uL 15.1* 15.5* 20.9*   RBC AUTO x10*6/uL 4.08 3.85* 3.93*   HEMOGLOBIN g/dL 10.5* 9.9* 10.3*   HEMATOCRIT % 31.9* 30.4* 31.7*   MCV fL 78* 79* 81   MCH pg 25.7* 25.7* 26.2   MCHC g/dL 32.9 32.6 32.5   RDW % 15.5* 15.6* 15.7*   PLATELETS AUTO x10*3/uL 415 347 331       Cardiac Enzymes:   Results from last 7 days   Lab Units 03/08/24  1549 03/08/24  1411   TROPHS ng/L 11 12         Hepatic Function Panel:  Results from last 7 days   Lab Units 03/09/24  0523 03/08/24  2221 03/08/24  1411   ALK PHOS U/L 127* 100 96   ALT U/L 32 29 27   AST U/L 35 31 25   PROTEIN TOTAL g/dL 6.5 6.5 7.5   BILIRUBIN TOTAL mg/dL 0.8 0.6 0.4       Magnesium:  Results from last 7 days   Lab Units 03/12/24  0522   MAGNESIUM mg/dL 2.11       Pro-BNP:  No results found for: \"PROBNP\"    INR:  Results from last 7 days   Lab Units 03/08/24  1411   PROTIME seconds 18.3*   INR  1.6*       TSH:  No results found for: \"TSH\"    Lipid Profile:        No lab exists for component: \"LABVLDL\"    HgbA1C:        Lactate Level:  No lab exists for component: \"LACTA\"    CMP:  Results from last 7 days   Lab Units 03/12/24  0522 03/11/24  0640 03/10/24  1619 03/09/24  0523 03/08/24  2221 03/08/24  1411   SODIUM mmol/L 141 139 139 137 136 138   POTASSIUM mmol/L 4.7 4.3 3.9 3.4* 3.0* 3.2*   CHLORIDE mmol/L 107 107 105 102 103 101   CO2 mmol/L 26 25 25 26 27 27   BUN mg/dL 22 17 17 6 5* 8   CREATININE mg/dL 2.61* 2.47* 2.29* 0.54 0.53 0.51   GLUCOSE mg/dL 93 115* 113* 98 122* 119*   CALCIUM mg/dL 9.2 8.4* 8.5* 8.1* 7.7* 8.7   PROTEIN TOTAL g/dL  --   --   --  6.5 6.5 7.5   BILIRUBIN TOTAL mg/dL  --   --   --  0.8 0.6 0.4   ALK PHOS U/L  --   --   --  127* 100 96   AST U/L  --   --   --  35 31 25   ALT U/L  --   --   --  32 29 27       Amylase:        Lipase:  Results from last 7 days   Lab Units 03/08/24  1411   LIPASE U/L 6*       ABG:        No " "lab exists for component: \"PO2\", \"PCO2\", \"HCO3\", \"BE\", \"O2SAT\"       "

## 2024-03-12 NOTE — PROGRESS NOTES
Occupational Therapy    Evaluation    Patient Name: Radha Mcmahon  MRN: [id number]  Today's Date: 3/12/2024  Time Calculation  Start Time: 0912  Stop Time: 0920  Time Calculation (min): 8 min      Assessment:  End of Session Communication: Bedside nurse  End of Session Patient Position: Bed, 2 rail up, Alarm on     Plan:  No Skilled OT: No acute OT goals identified  OT Frequency: OT eval only  OT Discharge Recommendations: No further acute OT  OT Recommended Transfer Status:  (Mod I)  OT - OK to Discharge: Yes (Once medically appropriate.)       Subjective   Current Problem:  1. Pneumonia, unspecified organism          General:  General  Reason for Referral: ADLs  Referred By: Dr. 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 Trop (-), EKG (-). CXR: near-complete opacification of the left hemithorax with volume loss. CT chest: (-) PE,  left-sided volume loss with leftward mediastinal shift; sizable left pleural effusion. Atelectasis/consolidation of the left  lung with mild-to-moderate associated bronchiectasis.     Vital Signs:  SpO2:  (98% 2L prior to mobility, 97% 2L s/p mobility.)  Pain:  Pain Assessment  Pain Assessment: 0-10  Pain Score: 6  Pain Location: Chest  Pain Orientation: Left    Objective   Cognition:  Overall Cognitive Status: Within Functional Limits (Oriented x4)           Home Living:  Home Living Comments: Patient lives alone in a high rise apartment building, [address], elevator access. Tub shower with a grab bar. Laundry located on the 1st floor.  Prior Function:  Prior Function Comments: Patient ambulates  independently without a device, does not own any DME. Independent with ADLs and IADLs. Denies falls in the past 3 months. Patient drives. 2L O2 at night.     ADL:  Eating Assistance: Independent  Grooming Assistance: Independent  Bathing Assistance: Modified independent (Device)  UE Dressing Assistance: Independent  LE Dressing Assistance: Modified independent (Device)  Toileting Assistance with Device: Modified independent  Activity Tolerance:  Endurance: Endurance does not limit participation in activity  Bed Mobility/Transfers: Bed Mobility  Bed Mobility: Yes  Bed Mobility 1  Bed Mobility 1: Supine to sitting, Sitting to supine  Level of Assistance 1: Modified independent  Bed Mobility Comments 1: HOB elevated.    Transfers  Transfer: Yes  Transfer 1  Technique 1: Stand to sit, Sit to stand  Transfer Device 1:  (No device)  Transfer Level of Assistance 1: Modified independent  Trials/Comments 1: Patient completed sit <> stands from EOB without a device at mod I level.      Functional Mobility:  Functional Mobility  Functional Mobility Performed: Yes  Functional Mobility 1  Comments 1: Patient completed functional mobility throughout the room without a device at mod I level.  Sitting Balance:  Dynamic Sitting Balance  Dynamic Sitting-Comments: Good  Standing Balance:  Dynamic Standing Balance  Dynamic Standing-Comments: Fair +      Strength:  Strength Comments: B/L UE MMT WFL     Extremities: RUE   RUE : Within Functional Limits and LUE   LUE: Within Functional Limits    Outcome Measures:[facility] Daily Activity  Putting on and taking off regular lower body clothing: None  Bathing (including washing, rinsing, drying): None  Putting on and taking off regular upper body clothing: None  Toileting, which includes using toilet, bedpan or urinal: None  Taking care of personal grooming such as brushing teeth: None  Eating Meals: None  Daily Activity - Total Score: 24    EDUCATION:  Education  Individual(s) Educated:  Patient  Education Provided: POC discussed and agreed upon, Risk and benefits of OT discussed with patient or other, Fall precautons  Patient Response to Education: Patient/Caregiver Verbalized Understanding of Information

## 2024-03-12 NOTE — PROGRESS NOTES
Physical Therapy    Physical Therapy Evaluation    Patient Name: Radha Mcmahon  MRN: [id number]  Today's Date: 3/12/2024   Time Calculation  Start Time: 0911  Stop Time: 0920  Time Calculation (min): 9 min    Assessment/Plan   PT Assessment  Rehab Prognosis: Good  Evaluation/Treatment Tolerance: Patient tolerated treatment well  Medical Staff Made Aware: Yes  Strengths: Ability to acquire knowledge, Attitude of self, Capable of completing ADLs semi/independent, Housing layout, Living arrangement secure, Premorbid level of function  End of Session Communication: Bedside nurse  End of Session Patient Position: Bed, 2 rail up, Alarm on (Call light within reach)  IP OR SWING BED PT PLAN  Inpatient or Swing Bed: Inpatient  PT Plan  PT Plan: PT Eval only  PT Eval Only Reason: No acute PT needs identified  PT Frequency: PT eval only  PT Discharge Recommendations: No further acute PT  PT Recommended Transfer Status: Independent  Physical Therapy eval completed per MD requisition. P.T. recommendations as outlined above. Recommend D/C from acute care when medically appropriate as deemed by medical staff.    Subjective       General Visit Information:  General  Reason for Referral: impiared mobility  Referred By: Dr. 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 is a 59yo who presented to [facility] ED on 3/8/2024 with c/o chest pain for a few days. Trop (-), EKG (-). CXR: near-complete opacification of the left hemithorax with volume loss. CT chest: (-) PE,  left-sided volume loss with leftward mediastinal shift; sizable left pleural effusion. Atelectasis/consolidation of the left   lung with mild-to-moderate associated bronchiectasis.    Hgb (3/12) 10.5 trendig up    Dx: pneumonia    Home Living:  Home Living  Home Living Comments: Patient lives alone in a high rise apartment building, [address], elevator access. Tub shower with a grab bar. Laundry located on the 1st floor.    Prior Level of Function:  Prior Function Per Pt/Caregiver Report  Prior Function Comments: Patient ambulates independently without a device, does not own any DME. Independent with ADLs and IADLs. Denies falls in the past 3 months. Patient drives. 2L O2 at night.    Precautions:  Precautions  Medical Precautions:  (Activity order: OOB with A)  Precautions Comment: Per EMR: High fall risk    Vital Signs:  Vital Signs  SpO2:  (On 2L O2, pre amb 98% post amb 97%)  Objective     Pain:  Pain Assessment  Pain Assessment: 0-10  Pain Score: 6  Pain Location:  (L flank pain)  Pain Interventions: Repositioned    Cognition:  Cognition  Overall Cognitive Status: Within Functional Limits    General Assessments:  General Observation  General Observation: IV, 2L O2   Activity Tolerance  Endurance: Endurance does not limit participation in activity                 Dynamic Sitting Balance  Dynamic Sitting-Comments: Good static and dynamic sitting balance  Dynamic Standing Balance  Dynamic Standing-Comments: Good static and dynamic standing balance    Functional Assessments:     Bed Mobility  Bed Mobility: Yes  Bed Mobility 1  Bed Mobility 1: Supine to sitting, Sitting to supine  Level of Assistance 1: Modified independent  Bed Mobility Comments 1: HOB elevated  Transfers  Transfer: Yes  Transfer 1  Technique 1: Sit to stand, Stand to sit  Transfer Device 1:  (No AD)  Transfer Level of Assistance 1: Modified independent  Transfers 2  Technique 2: Stand pivot  Transfer Device 2:  (No AD)  Transfer Level of Assistance 2: Independent  Ambulation/Gait Training  Ambulation/Gait Training Performed: Yes  Ambulation/Gait Training 1  Surface 1: Level  tile  Device 1: No device  Assistance 1: Independent  Quality of Gait 1:  (Steady, reciprocal gait with no LOB)  Comments/Distance (ft) 1: 40'  Stairs  Stairs: No       Extremity/Trunk Assessments:        RLE   RLE : Within Functional Limits  LLE   LLE : Within Functional Limits    Outcome Measures:  [facility] Basic Mobility  Turning from your back to your side while in a flat bed without using bedrails: None  Moving from lying on your back to sitting on the side of a flat bed without using bedrails: None  Moving to and from bed to chair (including a wheelchair): None  Standing up from a chair using your arms (e.g. wheelchair or bedside chair): None  To walk in hospital room: None  Climbing 3-5 steps with railing: A little  Basic Mobility - Total Score: 23                                Education Documentation  Mobility Training, taught by Grzegorz Hansen PT at 3/12/2024 12:47 PM.  Learner: Patient  Readiness: Acceptance  Method: Explanation  Response: Verbalizes Understanding  Comment: Role of PT

## 2024-03-12 NOTE — PROGRESS NOTES
Renal Progress Note    Assessment and Plan:   58 y.o. yo female admitted with shortness of breath.  She has underlying diabetes mellitus and hypertension.  Has history of lung cancer status post chemo and radiation.  Has left-sided pleural effusion.  She was on losartan HCT, Farxiga and amlodipine at home.  Kidney function was normal on admission at 0.5 creatinine.  Developed acute kidney injury that appears to be ATN.  The ATN is either from the contrast versus effects of antibiotics vancomycin and Zosyn.  Also could be a combination of both.  Urinalysis is negative for any signs of glomerular disease.     Plan/  Antibiotics have already been changed.  She is on IV fluids.  Increase to 75 ml/hr  Renal ultrasound negative  Expect kidney function to improve in the next couple days.  Do not need kidney function back to normal before discharge.  Just needed to be improving  We will have to decide on discharge which of her home meds including the losartan HCT as well as Farxiga should be restarted - probably will keep on hold until seen as outpt  discontinue kdur  Outpatient follow up from renal standpoint: TBD    Subjective:   Admit Date: 3/8/2024    Interval History: pt with some rise in cr still.  Making urine.  K ok now.  Renal ultrasound ok.        Medications:   Scheduled Meds:amLODIPine, 10 mg, oral, Daily  azithromycin, 500 mg, oral, q24h CHENG  cefTRIAXone, 2 g, intravenous, q24h  enoxaparin, 30 mg, subcutaneous, q24h  insulin lispro, 0-5 Units, subcutaneous, TID with meals  polyethylene glycol, 17 g, oral, Daily  potassium chloride, 20 mEq, oral, BID  sodium chloride, 10 mL, intravenous, q8h      Continuous Infusions:lactated Ringer's, 50 mL/hr, Last Rate: 50 mL/hr (03/12/24 0314)        CBC:   Lab Results   Component Value Date    HGB 10.5 (L) 03/12/2024    HGB 9.9 (L) 03/11/2024    WBC 15.1 (H) 03/12/2024    WBC 15.5 (H) 03/11/2024     03/12/2024     03/11/2024      Anemia:  No results found for:  "\"FERRITIN\", \"IRON\", \"TIBC\"   BMP:    Lab Results   Component Value Date     03/12/2024     03/11/2024    K 4.7 03/12/2024    K 4.3 03/11/2024     03/12/2024     03/11/2024    CO2 26 03/12/2024    CO2 25 03/11/2024    BUN 22 03/12/2024    BUN 17 03/11/2024    CREATININE 2.61 (H) 03/12/2024    CREATININE 2.47 (H) 03/11/2024      Bone disease:   Lab Results   Component Value Date    PHOS 3.7 03/12/2024      Urinalysis:    Lab Results   Component Value Date    STU 5.0 03/09/2024    PROTUR NEGATIVE 03/09/2024    GLUCOSEU NEGATIVE 03/09/2024    BLOODU SMALL (1+) (A) 03/09/2024    KETONESU NEGATIVE 03/09/2024    BILIRUBINU NEGATIVE 03/09/2024    NITRITEU NEGATIVE 03/09/2024    LEUKOCYTESU NEGATIVE 03/09/2024        Objective:   Vitals: /74 (BP Location: Right arm, Patient Position: Lying)   Pulse 70   Temp 36.5 °C (97.7 °F) (Temporal)   Resp 18   Ht 1.549 m (5' 1\")   Wt 78.5 kg (173 lb 1 oz)   SpO2 92%   BMI 32.70 kg/m²    Wt Readings from Last 3 Encounters:   03/08/24 78.5 kg (173 lb 1 oz)   01/28/24 83 kg (183 lb)      24HR INTAKE/OUTPUT:    Intake/Output Summary (Last 24 hours) at 3/12/2024 1045  Last data filed at 3/12/2024 0836  Gross per 24 hour   Intake 1985 ml   Output --   Net 1985 ml     Admission weight:  Weight: 79.4 kg (175 lb)      Constitutional:  Alert, awake, no apparent distress   Skin:normal, no rash  HEENT:sclera anicteric.  Head atraumatic normocephalic  Neck:supple with no thyromegally  Cardiovascular:  S1, S2 without m/r/g   Respiratory:  CTA B without w/r/r   Abdomen: +bs, soft, nt  Ext: no LE edema  Musculoskeletal:Intact  Neuro:Alert and oriented with no deficit      Electronically signed by Alin Lundberg MD on 3/12/2024 at 10:45 AM            "

## 2024-03-12 NOTE — PROGRESS NOTES
"SUBJECTIVE:   Admit Date: 3/8/2024                Interval History: Complains of chest pain at night particularly related to her cough.  Denies any other symptoms.        OBJECTIVE:   Vitals: /74 (BP Location: Right arm, Patient Position: Lying)   Pulse 70   Temp 36.5 °C (97.7 °F) (Temporal)   Resp 18   Ht 1.549 m (5' 1\")   Wt 78.5 kg (173 lb 1 oz)   SpO2 92%   BMI 32.70 kg/m²        Wt Readings from Last 3 Encounters:   03/08/24 78.5 kg (173 lb 1 oz)   01/28/24 83 kg (183 lb)      24HR INTAKE/OUTPUT:    Intake/Output Summary (Last 24 hours) at 3/12/2024 1249  Last data filed at 3/12/2024 0836      Gross per 24 hour   Intake 1985 ml   Output --   Net 1985 ml         PHYSICAL EXAM:   GENERAL: Laying in bed, does not appear to be in any distress.   HEENT: HEAD: Normocephalic atraumatic.  Neck: Supple.  Eyes: Pupils are reactive to direct light.   CVS: S1, S2 heard. Regular rate and rhythm  LUNGS: Coarse breath sounds appreciated.  No wheezing or rhonchi appreciated.  ABDOMEN: Soft, nontender to palpate. Positive bowel sounds. No guarding or rebound appreciated.  NEUROLOGICAL: No focal neurological deficits appreciated. Cranial nerves are grossly intact.  EXTREMITIES: No edema appreciated.  SKIN:  Grossly intact, warm and dry.        LABS/IMAGING AND MEDICATIONS:   Scheduled Meds:amLODIPine, 10 mg, oral, Daily  azithromycin, 500 mg, oral, q24h CHENG  cefTRIAXone, 2 g, intravenous, q24h  enoxaparin, 30 mg, subcutaneous, q24h  insulin lispro, 0-5 Units, subcutaneous, TID with meals  polyethylene glycol, 17 g, oral, Daily  sodium chloride, 10 mL, intravenous, q8h        PRN Meds:PRN medications: acetaminophen, dextrose 10 % in water (D10W), dextrose, glucagon, HYDROcodone-acetaminophen     No lab exists for component: \"CBC\"   No lab exists for component: \"CMP\"   No lab exists for component: \"TROPONIN\"       Results from last 7 days   Lab Units 03/08/24  1411   BNP pg/mL 128*           Results from last 7 days " "  Lab Units 03/08/24  1411   INR   1.6*      No lab exists for component: \"LIPIDS\"       No lab exists for component: \"URINALYSIS\"              BMP:         Results from last 7 days   Lab Units 03/12/24  0522 03/11/24  0640 03/10/24  1619   SODIUM mmol/L 141 139 139   POTASSIUM mmol/L 4.7 4.3 3.9   CHLORIDE mmol/L 107 107 105   CO2 mmol/L 26 25 25   BUN mg/dL 22 17 17   CREATININE mg/dL 2.61* 2.47* 2.29*         CBC:         Results from last 7 days   Lab Units 03/12/24  0522 03/11/24  0640 03/10/24  1619   WBC AUTO x10*3/uL 15.1* 15.5* 20.9*   RBC AUTO x10*6/uL 4.08 3.85* 3.93*   HEMOGLOBIN g/dL 10.5* 9.9* 10.3*   HEMATOCRIT % 31.9* 30.4* 31.7*   MCV fL 78* 79* 81   MCH pg 25.7* 25.7* 26.2   MCHC g/dL 32.9 32.6 32.5   RDW % 15.5* 15.6* 15.7*   PLATELETS AUTO x10*3/uL 415 347 331         Cardiac Enzymes:         Results from last 7 days   Lab Units 03/08/24  1549 03/08/24  1411   TROPHS ng/L 11 12            Hepatic Function Panel:         Results from last 7 days   Lab Units 03/09/24  0523 03/08/24  2221 03/08/24  1411   ALK PHOS U/L 127* 100 96   ALT U/L 32 29 27   AST U/L 35 31 25   PROTEIN TOTAL g/dL 6.5 6.5 7.5   BILIRUBIN TOTAL mg/dL 0.8 0.6 0.4         Magnesium:       Results from last 7 days   Lab Units 03/12/24  0522   MAGNESIUM mg/dL 2.11         Pro-BNP:  No results found for: \"PROBNP\"     INR:       Results from last 7 days   Lab Units 03/08/24  1411   PROTIME seconds 18.3*   INR   1.6*         TSH:  No results found for: \"TSH\"     Lipid Profile:         No lab exists for component: \"LABVLDL\"     HgbA1C:         Lactate Level:  No lab exists for component: \"LACTA\"     CMP:            Results from last 7 days   Lab Units 03/12/24  0522 03/11/24  0640 03/10/24  1619 03/09/24  0523 03/08/24  2221 03/08/24  1411   SODIUM mmol/L 141 139 139 137 136 138   POTASSIUM mmol/L 4.7 4.3 3.9 3.4* 3.0* 3.2*   CHLORIDE mmol/L 107 107 105 102 103 101   CO2 mmol/L 26 25 25 26 27 27   BUN mg/dL 22 17 17 6 5* 8 "   CREATININE mg/dL 2.61* 2.47* 2.29* 0.54 0.53 0.51   GLUCOSE mg/dL 93 115* 113* 98 122* 119*   CALCIUM mg/dL 9.2 8.4* 8.5* 8.1* 7.7* 8.7   PROTEIN TOTAL g/dL  --   --   --  6.5 6.5 7.5   BILIRUBIN TOTAL mg/dL  --   --   --  0.8 0.6 0.4   ALK PHOS U/L  --   --   --  127* 100 96   AST U/L  --   --   --  35 31 25   ALT U/L  --   --   --  32 29 27         ASSESSMENT AND PLAN:   Ms. Mcmahon is a 58 year old with PMH of COPD, history of an abdominal wall soft tissue mass, which showed poorly differentiated carcinoma with squamous cell features, S/p concurrent chemo/RT with weekly carboplatin/paclitaxel completed 3/6/20 and maintenance durvalumab 4/29/21.      She was found to have bacteremia, strep pneumo.     Acute hypoxic respiratory failure  Multifocal pneumonia  History of poorly differentiated squamous cell cancer status post chemotherapy  Left-sided pleural effusion with mediastinal shift  -P/W worsening shortness of breath  -CT angio of chest shows no evidence of pulmonary embolism, it did show left-sided volume loss with mediastinal shift.  -Blood cultures positive for strep pneumo, urine antigen negative  -Discontinue vancomycin, switch Zosyn to ceftriaxone, continue azithromycin.  -Spoke with pulmonology, chest CT reviewed from [facility] on 2/15 which shows a stable effusion.  Currently not a candidate for endobronchial stenting.  Will need to follow-up with pulmonology outpatient.     Strep pneumo bacteremia  -4 out of 4 blood cultures on admission positive for strep pneumo bacteremia, most likely related to pneumonia.  -Patient does have a port in place, infectious disease consulted.     Acute kidney injury secondary to ATN  -Creatinine on admission 1.0, current creatinine 2.61  -Ultrasound shows no evidence of hydronephrosis.     Hypertension  -Continue amlodipine     Diabetes mellitus  -Continue low-dose sliding scale     Disposition: Once creatinine stabilizes, can be potentially discharged.    D/W  Dr. Rosas (ID). He is recommending discontinuing Chemo port as it could be a source of Bacteraemia. Dr. Lal (IR) doesn't feel there sufficient fluid to tap in the Left Lung base.

## 2024-03-13 PROBLEM — T82.7XXA BACTEREMIA ASSOCIATED WITH INTRAVASCULAR LINE (CMS-HCC): Status: ACTIVE | Noted: 2024-03-08

## 2024-03-13 PROBLEM — R78.81 BACTEREMIA ASSOCIATED WITH INTRAVASCULAR LINE (CMS-HCC): Status: ACTIVE | Noted: 2024-03-08

## 2024-03-13 LAB
ALBUMIN SERPL BCP-MCNC: 2.8 G/DL (ref 3.4–5)
ANION GAP SERPL CALC-SCNC: 12 MMOL/L (ref 10–20)
BACTERIA BLD AEROBE CULT: ABNORMAL
BACTERIA BLD CULT: ABNORMAL
BASOPHILS # BLD AUTO: 0.06 X10*3/UL (ref 0–0.1)
BASOPHILS NFR BLD AUTO: 0.5 %
BUN SERPL-MCNC: 20 MG/DL (ref 6–23)
CALCIUM SERPL-MCNC: 9.3 MG/DL (ref 8.6–10.3)
CHLORIDE SERPL-SCNC: 105 MMOL/L (ref 98–107)
CO2 SERPL-SCNC: 28 MMOL/L (ref 21–32)
CREAT SERPL-MCNC: 2.12 MG/DL (ref 0.5–1.05)
EGFRCR SERPLBLD CKD-EPI 2021: 26 ML/MIN/1.73M*2
EOSINOPHIL # BLD AUTO: 0.35 X10*3/UL (ref 0–0.7)
EOSINOPHIL NFR BLD AUTO: 2.6 %
ERYTHROCYTE [DISTWIDTH] IN BLOOD BY AUTOMATED COUNT: 15.8 % (ref 11.5–14.5)
GLUCOSE SERPL-MCNC: 149 MG/DL (ref 74–99)
GRAM STN SPEC: ABNORMAL
HCT VFR BLD AUTO: 31.1 % (ref 36–46)
HGB BLD-MCNC: 10.3 G/DL (ref 12–16)
HOLD SPECIMEN: NORMAL
HOLD SPECIMEN: NORMAL
IMM GRANULOCYTES # BLD AUTO: 0.21 X10*3/UL (ref 0–0.7)
IMM GRANULOCYTES NFR BLD AUTO: 1.6 % (ref 0–0.9)
LYMPHOCYTES # BLD AUTO: 1.47 X10*3/UL (ref 1.2–4.8)
LYMPHOCYTES NFR BLD AUTO: 11.1 %
MAGNESIUM SERPL-MCNC: 1.85 MG/DL (ref 1.6–2.4)
MCH RBC QN AUTO: 25.9 PG (ref 26–34)
MCHC RBC AUTO-ENTMCNC: 33.1 G/DL (ref 32–36)
MCV RBC AUTO: 78 FL (ref 80–100)
MONOCYTES # BLD AUTO: 1.61 X10*3/UL (ref 0.1–1)
MONOCYTES NFR BLD AUTO: 12.2 %
NEUTROPHILS # BLD AUTO: 9.54 X10*3/UL (ref 1.2–7.7)
NEUTROPHILS NFR BLD AUTO: 72 %
NRBC BLD-RTO: 0 /100 WBCS (ref 0–0)
PHOSPHATE SERPL-MCNC: 4.3 MG/DL (ref 2.5–4.9)
PLATELET # BLD AUTO: 417 X10*3/UL (ref 150–450)
POTASSIUM SERPL-SCNC: 4.4 MMOL/L (ref 3.5–5.3)
RBC # BLD AUTO: 3.97 X10*6/UL (ref 4–5.2)
SODIUM SERPL-SCNC: 141 MMOL/L (ref 136–145)
WBC # BLD AUTO: 13.2 X10*3/UL (ref 4.4–11.3)

## 2024-03-13 PROCEDURE — 2500000004 HC RX 250 GENERAL PHARMACY W/ HCPCS (ALT 636 FOR OP/ED): Performed by: INTERNAL MEDICINE

## 2024-03-13 PROCEDURE — 87040 BLOOD CULTURE FOR BACTERIA: CPT | Mod: ELYLAB | Performed by: INTERNAL MEDICINE

## 2024-03-13 PROCEDURE — 2500000002 HC RX 250 W HCPCS SELF ADMINISTERED DRUGS (ALT 637 FOR MEDICARE OP, ALT 636 FOR OP/ED): Performed by: INTERNAL MEDICINE

## 2024-03-13 PROCEDURE — 2500000001 HC RX 250 WO HCPCS SELF ADMINISTERED DRUGS (ALT 637 FOR MEDICARE OP): Performed by: INTERNAL MEDICINE

## 2024-03-13 PROCEDURE — 36415 COLL VENOUS BLD VENIPUNCTURE: CPT | Performed by: INTERNAL MEDICINE

## 2024-03-13 PROCEDURE — 99233 SBSQ HOSP IP/OBS HIGH 50: CPT | Performed by: HOSPITALIST

## 2024-03-13 PROCEDURE — 2500000004 HC RX 250 GENERAL PHARMACY W/ HCPCS (ALT 636 FOR OP/ED): Performed by: HOSPITALIST

## 2024-03-13 PROCEDURE — 85025 COMPLETE CBC W/AUTO DIFF WBC: CPT | Performed by: INTERNAL MEDICINE

## 2024-03-13 PROCEDURE — 83735 ASSAY OF MAGNESIUM: CPT | Performed by: HOSPITALIST

## 2024-03-13 PROCEDURE — 1200000002 HC GENERAL ROOM WITH TELEMETRY DAILY

## 2024-03-13 PROCEDURE — 99222 1ST HOSP IP/OBS MODERATE 55: CPT | Performed by: REGISTERED NURSE

## 2024-03-13 PROCEDURE — 80069 RENAL FUNCTION PANEL: CPT | Performed by: INTERNAL MEDICINE

## 2024-03-13 RX ADMIN — SODIUM CHLORIDE 10 ML: 9 INJECTION, SOLUTION INTRAVENOUS at 13:00

## 2024-03-13 RX ADMIN — DEXTROSE MONOHYDRATE 2 G: 5 INJECTION INTRAVENOUS at 12:00

## 2024-03-13 RX ADMIN — AZITHROMYCIN 500 MG: 250 TABLET, FILM COATED ORAL at 08:40

## 2024-03-13 RX ADMIN — HYDROCODONE BITARTRATE AND ACETAMINOPHEN 1 TABLET: 5; 325 TABLET ORAL at 22:29

## 2024-03-13 RX ADMIN — HYDROCODONE BITARTRATE AND ACETAMINOPHEN 1 TABLET: 5; 325 TABLET ORAL at 04:15

## 2024-03-13 RX ADMIN — AMLODIPINE BESYLATE 10 MG: 5 TABLET ORAL at 08:40

## 2024-03-13 RX ADMIN — SODIUM CHLORIDE, POTASSIUM CHLORIDE, SODIUM LACTATE AND CALCIUM CHLORIDE 75 ML/HR: 600; 310; 30; 20 INJECTION, SOLUTION INTRAVENOUS at 04:15

## 2024-03-13 RX ADMIN — HYDROCODONE BITARTRATE AND ACETAMINOPHEN 1 TABLET: 5; 325 TABLET ORAL at 16:21

## 2024-03-13 RX ADMIN — HYDROCODONE BITARTRATE AND ACETAMINOPHEN 1 TABLET: 5; 325 TABLET ORAL at 10:34

## 2024-03-13 ASSESSMENT — ENCOUNTER SYMPTOMS
NEUROLOGICAL NEGATIVE: 1
SHORTNESS OF BREATH: 1
FATIGUE: 1
MUSCULOSKELETAL NEGATIVE: 1
EYES NEGATIVE: 1
GASTROINTESTINAL NEGATIVE: 1
PSYCHIATRIC NEGATIVE: 1

## 2024-03-13 ASSESSMENT — PAIN SCALES - GENERAL
PAINLEVEL_OUTOF10: 8
PAINLEVEL_OUTOF10: 0 - NO PAIN
PAINLEVEL_OUTOF10: 8
PAINLEVEL_OUTOF10: 7

## 2024-03-13 ASSESSMENT — PAIN - FUNCTIONAL ASSESSMENT
PAIN_FUNCTIONAL_ASSESSMENT: 0-10

## 2024-03-13 ASSESSMENT — PAIN DESCRIPTION - ORIENTATION: ORIENTATION: LEFT

## 2024-03-13 ASSESSMENT — PAIN DESCRIPTION - LOCATION: LOCATION: RIB CAGE

## 2024-03-13 NOTE — CARE PLAN
The patient's goals for the shift include      The clinical goals for the shift include safety      Problem: Pain  Goal: My pain/discomfort is manageable  Outcome: Not Progressing     Problem: Safety  Goal: Patient will be injury free during hospitalization  Outcome: Not Progressing  Goal: I will remain free of falls  Outcome: Not Progressing     Problem: Daily Care  Goal: Daily care needs are met  Outcome: Not Progressing     Problem: Psychosocial Needs  Goal: Demonstrates ability to cope with hospitalization/illness  Outcome: Not Progressing  Goal: Collaborate with me, my family, and caregiver to identify my specific goals  Outcome: Not Progressing     Problem: Discharge Barriers  Goal: My discharge needs are met  Outcome: Not Progressing

## 2024-03-13 NOTE — PROGRESS NOTES
PROGRESS NOTE    ASSESSMENT AND PLAN:   Ms. Mcmahon is a 58 year old with PMH of COPD, history of an abdominal wall soft tissue mass, which showed poorly differentiated carcinoma with squamous cell features, S/p concurrent chemo/RT with weekly carboplatin/paclitaxel completed 3/6/20 and maintenance durvalumab 4/29/21.      She was found to have bacteremia, strep pneumo.     Acute hypoxic respiratory failure  Multifocal pneumonia  History of poorly differentiated squamous cell cancer status post chemotherapy  Left-sided pleural effusion with mediastinal shift  -P/W worsening shortness of breath  -CT angio of chest shows no evidence of pulmonary embolism, it did show left-sided volume loss with mediastinal shift.  -Blood cultures positive for strep pneumo, urine antigen negative  -Vancomycin discontinued, Zosyn switched to ceftriaxone  -Spoke with pulmonology, chest CT reviewed from [facility] on 2/15 which shows a stable effusion.  Currently not a candidate for endobronchial stenting.  Will need to follow-up with pulmonology outpatient.  -General surgery consult for port removal.     Strep pneumo bacteremia  -4 out of 4 blood cultures on admission positive for strep pneumo bacteremia, most likely related to pneumonia.  -General surgery consult for port removal.     Acute kidney injury secondary to ATN  -Creatinine on admission 1.0, current creatinine 2.12, overall trending down  -Ultrasound shows no evidence of hydronephrosis.     Hypertension  -Continue amlodipine     Diabetes mellitus  -Continue low-dose sliding scale     Disposition: Plan for discharge as soon as port is removed and ID can finalize IV antibiotics.  I updated daughter Lisandro Elam over the phone [phone].  She agrees and understands plan of care.      SUBJECTIVE:   Admit Date: 3/8/2024    Interval History: Feels okay, still complains of left-sided chest pain when she sleeps.      OBJECTIVE:   Vitals: /75   Pulse 60   Temp 36.5 °C  "(97.7 °F)   Resp 16   Ht 1.549 m (5' 1\")   Wt 78.5 kg (173 lb 1 oz)   SpO2 99%   BMI 32.70 kg/m²    Wt Readings from Last 3 Encounters:   03/08/24 78.5 kg (173 lb 1 oz)   01/28/24 83 kg (183 lb)      24HR INTAKE/OUTPUT:    Intake/Output Summary (Last 24 hours) at 3/13/2024 1127  Last data filed at 3/13/2024 1002  Gross per 24 hour   Intake 1748.75 ml   Output 250 ml   Net 1498.75 ml       PHYSICAL EXAM:   GENERAL: Laying in bed, does not appear to be in any distress.   HEENT: HEAD: Normocephalic atraumatic.  Neck: Supple.  Eyes: Pupils are reactive to direct light.   CVS: S1, S2 heard. Regular rate and rhythm  LUNGS: Coarse breath sounds appreciated minimally.  ABDOMEN: Soft, nontender to palpate. Positive bowel sounds. No guarding or rebound appreciated.  NEUROLOGICAL: No focal neurological deficits appreciated. Cranial nerves are grossly intact.  EXTREMITIES: No edema appreciated.  SKIN:  Grossly intact, warm and dry.      LABS/IMAGING AND MEDICATIONS:   Scheduled Meds:amLODIPine, 10 mg, oral, Daily  cefTRIAXone, 2 g, intravenous, q24h  enoxaparin, 30 mg, subcutaneous, q24h  insulin lispro, 0-5 Units, subcutaneous, TID with meals  polyethylene glycol, 17 g, oral, Daily  sodium chloride, 10 mL, intravenous, q8h      PRN Meds:PRN medications: acetaminophen, dextrose 10 % in water (D10W), dextrose, glucagon, HYDROcodone-acetaminophen    No lab exists for component: \"CBC\"   No lab exists for component: \"CMP\"   No lab exists for component: \"TROPONIN\"  Results from last 7 days   Lab Units 03/08/24  1411   BNP pg/mL 128*     Results from last 7 days   Lab Units 03/08/24  1411   INR  1.6*     No lab exists for component: \"LIPIDS\"       No lab exists for component: \"URINALYSIS\"          BMP:  Results from last 7 days   Lab Units 03/13/24  0855 03/12/24  0522 03/11/24  0640   SODIUM mmol/L 141 141 139   POTASSIUM mmol/L 4.4 4.7 4.3   CHLORIDE mmol/L 105 107 107   CO2 mmol/L 28 26 25   BUN mg/dL 20 22 17   CREATININE " "mg/dL 2.12* 2.61* 2.47*       CBC:  Results from last 7 days   Lab Units 03/13/24  0855 03/12/24  0522 03/11/24  0640   WBC AUTO x10*3/uL 13.2* 15.1* 15.5*   RBC AUTO x10*6/uL 3.97* 4.08 3.85*   HEMOGLOBIN g/dL 10.3* 10.5* 9.9*   HEMATOCRIT % 31.1* 31.9* 30.4*   MCV fL 78* 78* 79*   MCH pg 25.9* 25.7* 25.7*   MCHC g/dL 33.1 32.9 32.6   RDW % 15.8* 15.5* 15.6*   PLATELETS AUTO x10*3/uL 417 415 347       Cardiac Enzymes:   Results from last 7 days   Lab Units 03/08/24  1549 03/08/24  1411   TROPHS ng/L 11 12         Hepatic Function Panel:  Results from last 7 days   Lab Units 03/09/24  0523 03/08/24  2221 03/08/24  1411   ALK PHOS U/L 127* 100 96   ALT U/L 32 29 27   AST U/L 35 31 25   PROTEIN TOTAL g/dL 6.5 6.5 7.5   BILIRUBIN TOTAL mg/dL 0.8 0.6 0.4       Magnesium:  Results from last 7 days   Lab Units 03/13/24  0855   MAGNESIUM mg/dL 1.85       Pro-BNP:  No results found for: \"PROBNP\"    INR:  Results from last 7 days   Lab Units 03/08/24  1411   PROTIME seconds 18.3*   INR  1.6*       TSH:  No results found for: \"TSH\"    Lipid Profile:        No lab exists for component: \"LABVLDL\"    HgbA1C:        Lactate Level:  No lab exists for component: \"LACTA\"    CMP:  Results from last 7 days   Lab Units 03/13/24  0855 03/12/24  0522 03/11/24  0640 03/10/24  1619 03/09/24  0523 03/08/24  2221 03/08/24  1411   SODIUM mmol/L 141 141 139   < > 137 136 138   POTASSIUM mmol/L 4.4 4.7 4.3   < > 3.4* 3.0* 3.2*   CHLORIDE mmol/L 105 107 107   < > 102 103 101   CO2 mmol/L 28 26 25   < > 26 27 27   BUN mg/dL 20 22 17   < > 6 5* 8   CREATININE mg/dL 2.12* 2.61* 2.47*   < > 0.54 0.53 0.51   GLUCOSE mg/dL 149* 93 115*   < > 98 122* 119*   CALCIUM mg/dL 9.3 9.2 8.4*   < > 8.1* 7.7* 8.7   PROTEIN TOTAL g/dL  --   --   --   --  6.5 6.5 7.5   BILIRUBIN TOTAL mg/dL  --   --   --   --  0.8 0.6 0.4   ALK PHOS U/L  --   --   --   --  127* 100 96   AST U/L  --   --   --   --  35 31 25   ALT U/L  --   --   --   --  32 29 27    < > = values in " "this interval not displayed.       Amylase:        Lipase:  Results from last 7 days   Lab Units 03/08/24  1411   LIPASE U/L 6*       ABG:        No lab exists for component: \"PO2\", \"PCO2\", \"HCO3\", \"BE\", \"O2SAT\"       "

## 2024-03-13 NOTE — PROGRESS NOTES
Patient continues on IV antibiotics, will wait for final regimen per ID. Patient plan is home, no needs if no IV infusion. Will continue to follow.

## 2024-03-13 NOTE — PROGRESS NOTES
"?  HISTORY OF PRESENT ILLNESS:    Feeling better overall. No fevers    Current Medications:    Current Facility-Administered Medications   Medication Dose Route Frequency Provider Last Rate Last Admin    acetaminophen (Tylenol) tablet 650 mg  650 mg oral q6h PRN Myrtle Tarango MD   650 mg at 03/08/24 2146    amLODIPine (Norvasc) tablet 10 mg  10 mg oral Daily Brenda Carlson MD   10 mg at 03/13/24 0840    cefTRIAXone (Rocephin) in dextrose 5 % water (D5W) 50 mL IV 2 g  2 g intravenous q24h Ilda Ricks MD   Stopped at 03/12/24 1318    dextrose 10 % in water (D10W) infusion  0.3 g/kg/hr intravenous Once PRN Brenda Carlson MD        dextrose 50 % injection 25 g  25 g intravenous q15 min PRN Brenda Carlson MD        enoxaparin (Lovenox) syringe 30 mg  30 mg subcutaneous q24h Ilda Ricks MD   30 mg at 03/11/24 1932    glucagon (Glucagen) injection 1 mg  1 mg intramuscular q15 min PRN Brenda Carlson MD        HYDROcodone-acetaminophen (Norco) 5-325 mg per tablet 1 tablet  1 tablet oral q6h PRN Brenda Carlson MD   1 tablet at 03/13/24 1034    insulin lispro (HumaLOG) injection 0-5 Units  0-5 Units subcutaneous TID with meals Brenda Carlson MD        lactated Ringer's infusion  75 mL/hr intravenous Continuous Alin Lundberg MD 75 mL/hr at 03/13/24 0415 75 mL/hr at 03/13/24 0415    polyethylene glycol (Glycolax, Miralax) packet 17 g  17 g oral Daily Brenda Carlson MD   17 g at 03/11/24 0906    sodium chloride 0.9 % bolus 10 mL  10 mL intravenous q8h Brenda Carlson MD   Stopped at 03/11/24 0716        Allergies:    Allergies   Allergen Reactions    Morphine Itching          Review of Systems  14 system review is negative other than HPI     /75   Pulse 60   Temp 36.5 °C (97.7 °F)   Resp 16   Ht 1.549 m (5' 1\")   Wt 78.5 kg (173 lb 1 oz)   SpO2 99%   BMI 32.70 kg/m²    Physical Exam:      General: Patient appears ok at the present time. NAD  Skin: no " new rashes  HEENT:  Neck is supple, No subconjunctival hemorrhages, no oral exudates  Heart: S1 S2  Lungs: diminshed bases  Abdomen: soft, ND, NTTP,  Back :no CVA tenderness  Extrem: No edema, non tender  Neuro exam: CN II-XII intact  Psych: cooperative         DATA:    .  Lab Results   Component Value Date    WBC 13.2 (H) 03/13/2024    HGB 10.3 (L) 03/13/2024    HCT 31.1 (L) 03/13/2024    MCV 78 (L) 03/13/2024     03/13/2024     Results from last 7 days   Lab Units 03/13/24  0855 03/10/24  1619 03/09/24  0523   SODIUM mmol/L 141   < > 137   POTASSIUM mmol/L 4.4   < > 3.4*   CHLORIDE mmol/L 105   < > 102   CO2 mmol/L 28   < > 26   BUN mg/dL 20   < > 6   CREATININE mg/dL 2.12*   < > 0.54   CALCIUM mg/dL 9.3   < > 8.1*   PROTEIN TOTAL g/dL  --   --  6.5   BILIRUBIN TOTAL mg/dL  --   --  0.8   ALK PHOS U/L  --   --  127*   ALT U/L  --   --  32   AST U/L  --   --  35   GLUCOSE mg/dL 149*   < > 98    < > = values in this interval not displayed.   Susceptibility data from last 90 days.  Collected Specimen Info Organism Ceftriaxone (Meningitis) Ceftriaxone (Nonmeningitis) Clindamycin Erythromycin Penicillin (IV, Meningitidis) Penicillin (IV, Nonmeningitis) Penicillin (Oral) Tetracycline Trimethoprim/Sulfamethoxazole Vancomycin   03/08/24 Blood culture from Peripheral Venipuncture Streptococcus pneumoniae S S I R S S S R I S   03/08/24 Blood culture from Peripheral Venipuncture Streptococcus pneumoniae             Susceptibility data from last 90 days.  Collected Specimen Info Organism Ceftriaxone (Meningitis) Ceftriaxone (Nonmeningitis) Clindamycin Erythromycin Penicillin (IV, Meningitidis) Penicillin (IV, Nonmeningitis) Penicillin (Oral) Tetracycline Trimethoprim/Sulfamethoxazole Vancomycin   03/08/24 Blood culture from Peripheral Venipuncture Streptococcus pneumoniae S S I R S S S R I S   03/08/24 Blood culture from Peripheral Venipuncture Streptococcus pneumoniae                     IMPRESSION:        Problem  List Items Addressed This Visit          Pulmonary and Pneumonias    * (Principal) Pneumonia, unspecified organism - Primary        Strep pneumo bacteremia  Lung cancer    Discussed with pulmonary, they spoke with radiology who does not think there is much pleural fluid to drain, and pleural effusion similar to previously. Port may or may not be involved   PLAN:  continue ceftriaxone  Port can be removed  Repeat blood cultures      Marino Rosas MD

## 2024-03-13 NOTE — CONSULTS
Infectious Disease    Patient Name: Radha Mcmahon  Date: 3/12/2024  YOB: 1965  Medical Record Number: [id number]        Chief Complaint   Patient presents with    Chest Pain     Gradually worsening over the past few days. Left sided.          History of Present Illness: Patient presents with left-sided chest pain that started day prior to admission w/ shortness of breath. Left sided pain, worse with deep breathing . Hx of squamous cell cancer left lung. Has been off chemo, has port right chest. CTA of the chest was done that did not show any evidence of pulmonary embolism but also showed left-sided volume loss with leftward mediastinal shift and sizable left pleural effusion   Blood cultures + for strep pneumo.       Review of Systems: All other ROS reviewed and are negative other than as stated in HPI            Social History     Tobacco Use    Smoking status: Former     Types: Cigarettes    Smokeless tobacco: Never         History reviewed. No pertinent past medical history.        History reviewed. No pertinent surgical history.        Current Facility-Administered Medications:     acetaminophen (Tylenol) tablet 650 mg, 650 mg, oral, q6h PRN, Myrtle Tarango MD, 650 mg at 03/08/24 2146    amLODIPine (Norvasc) tablet 10 mg, 10 mg, oral, Daily, Brenda Carlson MD, 10 mg at 03/12/24 0820    azithromycin (Zithromax) tablet 500 mg, 500 mg, oral, q24h CHENG, Brenda Carlson MD, 500 mg at 03/12/24 0820    cefTRIAXone (Rocephin) in dextrose 5 % water (D5W) 50 mL IV 2 g, 2 g, intravenous, q24h, Ilda Ricks MD, Stopped at 03/12/24 1318    dextrose 10 % in water (D10W) infusion, 0.3 g/kg/hr, intravenous, Once PRN, Brenda Carlson MD    dextrose 50 % injection 25 g, 25 g, intravenous, q15 min PRN, Brenda Carlson MD    enoxaparin (Lovenox) syringe 30 mg, 30 mg, subcutaneous, q24h, Ilda Ricks MD, 30 mg at 03/11/24 1932    glucagon (Glucagen) injection 1 mg, 1  "mg, intramuscular, q15 min PRN, Brenda Carlson MD    HYDROcodone-acetaminophen (Norco) 5-325 mg per tablet 1 tablet, 1 tablet, oral, q6h PRN, Brenda Carlson MD, 1 tablet at 03/12/24 1427    insulin lispro (HumaLOG) injection 0-5 Units, 0-5 Units, subcutaneous, TID with meals, Brenda Carlson MD    lactated Ringer's infusion, 75 mL/hr, intravenous, Continuous, Alin Lundberg MD, Last Rate: 75 mL/hr at 03/12/24 1111, 75 mL/hr at 03/12/24 1111    polyethylene glycol (Glycolax, Miralax) packet 17 g, 17 g, oral, Daily, Brenda Carlson MD, 17 g at 03/11/24 0906    [DISCONTINUED] piperacillin-tazobactam-dextrose (Zosyn) IV 3.375 g, 3.375 g, intravenous, q8h, Stopped at 03/11/24 1016 **AND** sodium chloride 0.9 % bolus 10 mL, 10 mL, intravenous, q8h, Brenda Carlson MD, Stopped at 03/11/24 0716     Allergies   Allergen Reactions    Morphine Itching          No family history on file.      Physical Exam:    Blood pressure 129/66, pulse 75, temperature 37 °C (98.6 °F), resp. rate 18, height 1.549 m (5' 1\"), weight 78.5 kg (173 lb 1 oz), SpO2 97 %.  General: Patient appears ok at the present time. NAD  Skin: no new rashes  HEENT:  Neck is supple, No subconjunctival hemorrhages, no oral exudates  Heart: S1 S2  Lungs: diminshed bases  Abdomen: soft, ND, NTTP,  Back :no CVA tenderness  Extrem: No edema, non tender  Neuro exam: CN II-XII intact  Psych: cooperative    Labs:  I have reviewed all lab results by electronic record, including most recent CBC, metabolic panel, and pertinent abnormalities were addressed from an infectious disease perspective.  Trends are being monitored over time.    Lab Results   Component Value Date    WBC 15.1 (H) 03/12/2024    HGB 10.5 (L) 03/12/2024    HCT 31.9 (L) 03/12/2024    MCV 78 (L) 03/12/2024     03/12/2024     Lab Results   Component Value Date    GLUCOSE 93 03/12/2024    CALCIUM 9.2 03/12/2024     03/12/2024    K 4.7 03/12/2024    CO2 26 03/12/2024     " 03/12/2024    BUN 22 03/12/2024    CREATININE 2.61 (H) 03/12/2024       Radiology:  I have reviewed imaging results per electronic record and most pertinent abnormalities are being addressed from an infectious disease standpoint.            ASSESSMENT:  Problem List Items Addressed This Visit          Pulmonary and Pneumonias    * (Principal) Pneumonia, unspecified organism - Primary      Strep pneumo bacteremia  Lung cancer    Discussed with pulmonary, they spoke with radiology who does not think there is much pleural fluid to drain, and pleural effusion similar to previously. Port may or may not be involved      PLAN:  Continue same therapy  Port can be removed   I

## 2024-03-13 NOTE — CONSULTS
Inpatient consult to Acute Care Surgery  Consult performed by: JENNIFER Aragon-CNP  Consult ordered by: Ilda Ricks MD  Reason for consult: Port removal      General Surgery Consult Note  Patient: Radha Mcmahon  Unit/Bed: 1004/1004-B  YOB: 1965  MRN: [id number]  Acct: [id number]   Admitting Diagnosis: Pneumonia, unspecified organism [J18.9]  Date:  3/8/2024  Hospital Day: 5  Attending: Ilda Burger*    Complaint:  Chief Complaint   Patient presents with    Chest Pain     Gradually worsening over the past few days. Left sided.       History of Present Illness:  Radha Mcmahon is a 58 y.o. 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  But the patient chest pain is more pleuritic chest pain.  She otherwise denies any dizziness lightheadedness being sweaty or clammy.  Patient states she has oxygen at home but does not wear oxygen normally.  She also reports some subjective fever.  CTA of the chest was done that did not show any evidence of pulmonary embolism but also showed left-sided volume loss with leftward mediastinal shift and sizable left pleural effusion.  Underlying infectious etiology is not excluded.  Also shows right middle lobe poor attachment on the right side that was not present previously that may be a focus of infection.  Troponins were within normal limits EKG without any ST changes.  Patient was started on broad-spectrum antibiotics and admitted for further care  and management.     Allergies:  Allergies   Allergen Reactions    Morphine Itching      PMHx:  History reviewed. No pertinent past medical history.  PSHx:  History reviewed. No pertinent surgical history.  Social Hx:  Social History     Socioeconomic History    Marital status:      Spouse name: None    Number of children: None    Years of education: None    Highest education level: None   Occupational History    None   Tobacco Use    Smoking status: Former     Types: Cigarettes    Smokeless tobacco: Never   Substance and Sexual Activity    Alcohol use: None    Drug use: None    Sexual activity: None   Other Topics Concern    None   Social History Narrative    None     Social Determinants of Health     Financial Resource Strain: Medium Risk (3/8/2024)    Overall Financial Resource Strain (CARDIA)     Difficulty of Paying Living Expenses: Somewhat hard   Food Insecurity: Not on file   Transportation Needs: No Transportation Needs (3/8/2024)    PRAPARE - Transportation     Lack of Transportation (Medical): No     Lack of Transportation (Non-Medical): No   Physical Activity: Not on file   Stress: Not on file   Social Connections: Not on file   Intimate Partner Violence: Not on file   Housing Stability: High Risk (3/8/2024)    Housing Stability Vital Sign     Unable to Pay for Housing in the Last Year: Yes     Number of Places Lived in the Last Year: 1     Unstable Housing in the Last Year: No     Family Hx:  No family history on file.  Review of Systems:   Review of Systems   Constitutional:  Positive for fatigue.   HENT: Negative.     Eyes: Negative.    Respiratory:  Positive for shortness of breath.    Gastrointestinal: Negative.    Genitourinary: Negative.    Musculoskeletal: Negative.    Skin: Negative.    Neurological: Negative.    Psychiatric/Behavioral: Negative.       Physical Examination:    Visit Vitals  /75   Pulse 60   Temp 36.5 °C (97.7 °F)   Resp 16      Physical Exam  Vitals reviewed.    Constitutional:       Appearance: Normal appearance. She is overweight.   HENT:      Head: Normocephalic.      Nose: Nose normal.      Mouth/Throat:      Mouth: Mucous membranes are moist.   Cardiovascular:      Rate and Rhythm: Normal rate.   Pulmonary:      Effort: Pulmonary effort is normal.   Chest:      Comments: Right chest mediport  Abdominal:      General: Bowel sounds are normal.      Palpations: Abdomen is soft.   Skin:     General: Skin is warm.      Capillary Refill: Capillary refill takes less than 2 seconds.   Neurological:      General: No focal deficit present.      Mental Status: She is alert and oriented to person, place, and time.   Psychiatric:         Mood and Affect: Mood normal.       LABS:  CBC:   Lab Results   Component Value Date    WBC 13.2 (H) 03/13/2024    RBC 3.97 (L) 03/13/2024    HGB 10.3 (L) 03/13/2024    HCT 31.1 (L) 03/13/2024    MCV 78 (L) 03/13/2024    MCH 25.9 (L) 03/13/2024    MCHC 33.1 03/13/2024    RDW 15.8 (H) 03/13/2024     03/13/2024     CBC with Differential:    Lab Results   Component Value Date    WBC 13.2 (H) 03/13/2024    RBC 3.97 (L) 03/13/2024    HGB 10.3 (L) 03/13/2024    HCT 31.1 (L) 03/13/2024     03/13/2024    MCV 78 (L) 03/13/2024    MCH 25.9 (L) 03/13/2024    MCHC 33.1 03/13/2024    RDW 15.8 (H) 03/13/2024    NRBC 0.0 03/13/2024    LYMPHOPCT 11.1 03/13/2024    MONOPCT 12.2 03/13/2024    EOSPCT 2.6 03/13/2024    BASOPCT 0.5 03/13/2024    MONOSABS 1.61 (H) 03/13/2024    LYMPHSABS 1.47 03/13/2024    EOSABS 0.35 03/13/2024    BASOSABS 0.06 03/13/2024     CMP:    Lab Results   Component Value Date     03/13/2024    K 4.4 03/13/2024     03/13/2024    CO2 28 03/13/2024    BUN 20 03/13/2024    CREATININE 2.12 (H) 03/13/2024    GLUCOSE 149 (H) 03/13/2024    CALCIUM 9.3 03/13/2024     BMP:    Lab Results   Component Value Date     03/13/2024    K 4.4 03/13/2024     03/13/2024    CO2 28 03/13/2024    BUN 20 03/13/2024    CREATININE  "2.12 (H) 03/13/2024    CALCIUM 9.3 03/13/2024    GLUCOSE 149 (H) 03/13/2024     Magnesium:  Lab Results   Component Value Date    MG 1.85 03/13/2024     Troponin:  No results found for: \"TROPHS\"  Lipid Panel:  No results found for: \"HDL\", \"CHHDL\", \"VLDL\", \"TRIG\", \"NHDL\"   Current Medications:    Current Facility-Administered Medications:     acetaminophen (Tylenol) tablet 650 mg, 650 mg, oral, q6h PRN, Myrtle Tarango MD, 650 mg at 03/08/24 2146    amLODIPine (Norvasc) tablet 10 mg, 10 mg, oral, Daily, Brenda Carlson MD, 10 mg at 03/13/24 0840    cefTRIAXone (Rocephin) in dextrose 5 % water (D5W) 50 mL IV 2 g, 2 g, intravenous, q24h, Ilda Ricks MD, Stopped at 03/13/24 1230    dextrose 10 % in water (D10W) infusion, 0.3 g/kg/hr, intravenous, Once PRN, Brenda Carlson MD    dextrose 50 % injection 25 g, 25 g, intravenous, q15 min PRN, Brenda Carlson MD    enoxaparin (Lovenox) syringe 30 mg, 30 mg, subcutaneous, q24h, Ilda Ricks MD, 30 mg at 03/11/24 1932    glucagon (Glucagen) injection 1 mg, 1 mg, intramuscular, q15 min PRN, Brenda Carlson MD    HYDROcodone-acetaminophen (Norco) 5-325 mg per tablet 1 tablet, 1 tablet, oral, q6h PRN, Brenda Carlson MD, 1 tablet at 03/13/24 1034    insulin lispro (HumaLOG) injection 0-5 Units, 0-5 Units, subcutaneous, TID with meals, Brenda Carlson MD    lactated Ringer's infusion, 75 mL/hr, intravenous, Continuous, Alin Lundberg MD, Last Rate: 75 mL/hr at 03/13/24 0415, 75 mL/hr at 03/13/24 0415    polyethylene glycol (Glycolax, Miralax) packet 17 g, 17 g, oral, Daily, Brenda Carlson MD, 17 g at 03/11/24 0906    [DISCONTINUED] piperacillin-tazobactam-dextrose (Zosyn) IV 3.375 g, 3.375 g, intravenous, q8h, Stopped at 03/11/24 1016 **AND** sodium chloride 0.9 % bolus 10 mL, 10 mL, intravenous, q8h, Brenda Carlson MD, Last Rate: 10 mL/hr at 03/13/24 1300, 10 mL at 03/13/24 1300  ECG 12 lead    Result Date: " 3/10/2024  Normal sinus rhythm Possible Left atrial enlargement Borderline ECG When compared with ECG of 07-NOV-2019 09:50, Nonspecific T wave abnormality now evident in Lateral leads See ED provider note for full interpretation and clinical correlation Confirmed by Brenda Small ([id number]) on 3/10/2024 12:07:46 PM    CT angio chest for pulmonary embolism    Result Date: 3/8/2024  Interpreted By:  Mo Kee, STUDY: CT ANGIO CHEST FOR PULMONARY EMBOLISM;  3/8/2024 4:17 pm   INDICATION: 57 y/o   F with  Signs/Symptoms:sob.   LIMITATIONS: None.   ACCESSION NUMBER(S): [id number]   ORDERING CLINICIAN: EBONY KILGORE   TECHNIQUE: After the administration of intravenous nonionic contrast, thin-section arterial phase images were obtained  from the thoracic inlet down through the iliac crest. Sagittal and coronal reconstruction images were generated. Axial and coronal MIP vascular reconstruction images were also generated.   Mediastinal, lung, bone, and liver windows were reviewed. IV contrast agent was Omnipaque 350, 75 mL.     COMPARISON: Previous CT pulmonary angiogram dated 11/07/2019.   FINDINGS: CHEST WALL/BASE OF THE NECK: The chest wall was grossly intact. No thyromegaly or thyroid mass.   MEDIASTINUM/FAROOQ: There is mild subcarinal mediastinal adenopathy with lymph nodes measuring 12 mm AP or less. There is pretracheal adenopathy measuring 8 mm short axis or less. There is mild right hilar adenopathy with lymph nodes measuring 11 mm short axis or less. Cannot accurately assess the left hilum for adenopathy. No axillary adenopathy. Mild cardiomegaly. No pericardial effusion. No thoracic aortic aneurysm or dissection. Mild mural calcifications in the thoracic aorta. No pulmonary artery filling defect. Left lobar and segmental vessels are  diminutive peripherally.   LUNGS/ PLEURA/ AND TRACHEA: There is no pleural effusion on the right. There is mild compensatory hyper aeration of the right lung. There is a  small old area of localized but ill-defined linear and also somewhat nodular density with associated pleural thickening anteriorly in the mid right upper lobe on axial images 62 through 90 of the lung windows. No other abnormal density in the right lung.   There is prominent left-sided volume loss with mediastinal shift to the left. There is moderate pleural fluid on the left. The left lung shows diffuse bronchiectasis with surrounding consolidated/atelectatic lung. The lung density surrounds the left hilum. The trachea was grossly intact.   BONES: No destructive lytic or blastic bone lesion.   UPPER ABDOMEN: The imaged upper abdomen was grossly intact.       No CT evidence of pulmonary embolism in the current exam.   There is left-sided volume loss with leftward mediastinal shift. Sizable left pleural effusion. Atelectasis/consolidation of the left lung with mild-to-moderate associated bronchiectasis. Underlying infectious etiology is not excluded. Consider bronchoscopy in follow-up.   Mild nonspecific mediastinal and right hilar adenopathy.   Small area linear and somewhat nodular infiltrative density with pleural attachment anteriorly in the mid  right upper lobe, not present previously. This could be a small focus of infection. Interval development of an area of pleural and parenchymal scarring is the main differential consideration.   Cardiomegaly.   MACRO: None   Signed by: Mo Kee 3/8/2024 4:47 PM Dictation workstation:   [id number]    XR chest 2 views    Result Date: 3/8/2024  Interpreted By:  Sixto Sinha, STUDY: XR CHEST 2 VIEWS;  3/8/2024 2:05 pm   INDICATION: Signs/Symptoms:Chest Pain, sob, cough.   COMPARISON: 11/07/2019   ACCESSION NUMBER(S): [id number]   ORDERING CLINICIAN: EBONY KILGORE   FINDINGS: There is near complete opacification of the left hemithorax, with minimal aeration of the upper lobe. This would indicate consolidating infiltrate, including possibly postobstructive from  underlying mass or neoplasm, with volume loss as there shift of the heart mediastinum to the left. However, there may also be an effusion. Follow-up or CT would be recommended. The right lung is clear in the right pulmonary vasculature unremarkable. The previous chest film demonstrated opacification suspicious for a mass in the upper lung medially.   ABDOMEN AND OTHER FINDINGS: No remarkable upper abdominal findings.   BONES: No acute osseous changes.       1.  Near-complete opacification of the left hemithorax with volume loss as described.   Signed by: Sixto Sinha 3/8/2024 2:25 PM Dictation workstation:   [id number]       Assessment:    Mediport removal     Patient has 4 out of 4 blood cultures on admission positive for strep pneumo bacteremia, most likely related to pneumonia. Infectious disease recommends Mediport be removed. General surgery consulted for MediPort removal and send for cultures.       Plan:  -NPO at midnight  -Pain control  -Nausea control  -DVT prophylaxis-per primary team   -Pulmonary toileting   -Encourage ambulation  -Continue care per primary team     Further recommendations per Dr. Rao     Time spent  60  minutes obtaining labs, imaging, recommendations, interview, assessment, examination, medication review/ordering, and EMR review.    Plan of care was discussed extensively with patient. Patient verbalized understanding through teach back method. All questions and concerns addressed upon examination.     Of note, this documentation is completed using the Dragon Dictation system (voice recognition software). There may be spelling and/or grammatical errors that were not corrected prior to final submission.    Electronically signed by MERCEDEZ Aragon on 3/13/2024 at 1:50 PM

## 2024-03-13 NOTE — PROGRESS NOTES
"Renal Progress Note    Assessment and Plan:   58 y.o. yo female admitted with shortness of breath.  She has underlying diabetes mellitus and hypertension.  Has history of lung cancer status post chemo and radiation.  Has left-sided pleural effusion.  She was on losartan HCT, Farxiga and amlodipine at home.  Kidney function was normal on admission at 0.5 creatinine.  Developed acute kidney injury that appears to be ATN.  The ATN is either from the contrast versus effects of antibiotics vancomycin and Zosyn.  Also could be a combination of both.  Urinalysis is negative for any signs of glomerular disease.     Plan/  Antibiotics have already been changed.  She is on IV fluids.    Renal ultrasound negative  GFR imprvoing  Ok for discharge once port removed and abx finalized  Keep losartan/hydrochlorothiazide and farxiga on hold until seen as outpt and gfr back to normal     Outpatient follow up from renal standpoint: TBD    Subjective:   Admit Date: 3/8/2024    Interval History: cr improving  Renal ultrasound ok.  Working on port removal and finalizing of abx      Medications:   Scheduled Meds:amLODIPine, 10 mg, oral, Daily  cefTRIAXone, 2 g, intravenous, q24h  enoxaparin, 30 mg, subcutaneous, q24h  insulin lispro, 0-5 Units, subcutaneous, TID with meals  polyethylene glycol, 17 g, oral, Daily  sodium chloride, 10 mL, intravenous, q8h      Continuous Infusions:lactated Ringer's, 75 mL/hr, Last Rate: 75 mL/hr (03/13/24 8570)        CBC:   Lab Results   Component Value Date    HGB 10.3 (L) 03/13/2024    HGB 10.5 (L) 03/12/2024    WBC 13.2 (H) 03/13/2024    WBC 15.1 (H) 03/12/2024     03/13/2024     03/12/2024      Anemia:  No results found for: \"FERRITIN\", \"IRON\", \"TIBC\"   BMP:    Lab Results   Component Value Date     03/13/2024     03/12/2024    K 4.4 03/13/2024    K 4.7 03/12/2024     03/13/2024     03/12/2024    CO2 28 03/13/2024    CO2 26 03/12/2024    BUN 20 03/13/2024    BUN 22 " "03/12/2024    CREATININE 2.12 (H) 03/13/2024    CREATININE 2.61 (H) 03/12/2024      Bone disease:   Lab Results   Component Value Date    PHOS 4.3 03/13/2024      Urinalysis:    No results found for: \"STU\", \"PROTUR\", \"GLUCOSEU\", \"BLOODU\", \"KETONESU\", \"BILIRUBINU\", \"NITRITEU\", \"LEUKOCYTESU\", \"UTPCR\"       Objective:   Vitals: /75   Pulse 60   Temp 36.5 °C (97.7 °F)   Resp 16   Ht 1.549 m (5' 1\")   Wt 78.5 kg (173 lb 1 oz)   SpO2 99%   BMI 32.70 kg/m²    Wt Readings from Last 3 Encounters:   03/08/24 78.5 kg (173 lb 1 oz)   01/28/24 83 kg (183 lb)      24HR INTAKE/OUTPUT:    Intake/Output Summary (Last 24 hours) at 3/13/2024 1411  Last data filed at 3/13/2024 1002  Gross per 24 hour   Intake 1748.75 ml   Output 250 ml   Net 1498.75 ml       Admission weight:  Weight: 79.4 kg (175 lb)      Constitutional:  Alert, awake, no apparent distress   Skin:normal, no rash  HEENT:sclera anicteric.  Head atraumatic normocephalic  Neck:supple with no thyromegally  Cardiovascular:  S1, S2 without m/r/g   Respiratory:  CTA B without w/r/r   Abdomen: +bs, soft, nt  Ext: no LE edema  Musculoskeletal:Intact  Neuro:Alert and oriented with no deficit      Electronically signed by Alin Lundberg MD on 3/13/2024 at 2:11 PM            "

## 2024-03-14 ENCOUNTER — ANESTHESIA (OUTPATIENT)
Dept: OPERATING ROOM | Facility: HOSPITAL | Age: 59
DRG: 193 | End: 2024-03-14
Payer: COMMERCIAL

## 2024-03-14 ENCOUNTER — APPOINTMENT (OUTPATIENT)
Dept: RADIOLOGY | Facility: HOSPITAL | Age: 59
DRG: 193 | End: 2024-03-14
Payer: COMMERCIAL

## 2024-03-14 ENCOUNTER — ANESTHESIA EVENT (OUTPATIENT)
Dept: OPERATING ROOM | Facility: HOSPITAL | Age: 59
DRG: 193 | End: 2024-03-14
Payer: COMMERCIAL

## 2024-03-14 VITALS
BODY MASS INDEX: 32.67 KG/M2 | TEMPERATURE: 98.1 F | SYSTOLIC BLOOD PRESSURE: 114 MMHG | RESPIRATION RATE: 14 BRPM | OXYGEN SATURATION: 93 % | DIASTOLIC BLOOD PRESSURE: 61 MMHG | HEIGHT: 61 IN | WEIGHT: 173.06 LBS | HEART RATE: 80 BPM

## 2024-03-14 PROBLEM — N17.0 ACUTE RENAL FAILURE WITH TUBULAR NECROSIS (CMS-HCC): Status: ACTIVE | Noted: 2024-03-14

## 2024-03-14 PROBLEM — J44.9 CHRONIC OBSTRUCTIVE PULMONARY DISEASE (MULTI): Status: ACTIVE | Noted: 2024-03-14

## 2024-03-14 PROBLEM — I10 PRIMARY HYPERTENSION: Status: ACTIVE | Noted: 2024-03-14

## 2024-03-14 PROBLEM — E11.9 DIABETES MELLITUS, TYPE 2 (MULTI): Status: ACTIVE | Noted: 2024-03-14

## 2024-03-14 PROBLEM — D64.9 ANEMIA: Status: ACTIVE | Noted: 2024-03-14

## 2024-03-14 LAB
ALBUMIN SERPL BCP-MCNC: 2.8 G/DL (ref 3.4–5)
ANION GAP SERPL CALC-SCNC: 11 MMOL/L (ref 10–20)
BASOPHILS # BLD AUTO: 0.07 X10*3/UL (ref 0–0.1)
BASOPHILS NFR BLD AUTO: 0.5 %
BUN SERPL-MCNC: 20 MG/DL (ref 6–23)
CALCIUM SERPL-MCNC: 9 MG/DL (ref 8.6–10.3)
CHLORIDE SERPL-SCNC: 105 MMOL/L (ref 98–107)
CO2 SERPL-SCNC: 30 MMOL/L (ref 21–32)
CREAT SERPL-MCNC: 1.99 MG/DL (ref 0.5–1.05)
EGFRCR SERPLBLD CKD-EPI 2021: 28 ML/MIN/1.73M*2
EOSINOPHIL # BLD AUTO: 0.3 X10*3/UL (ref 0–0.7)
EOSINOPHIL NFR BLD AUTO: 2.1 %
ERYTHROCYTE [DISTWIDTH] IN BLOOD BY AUTOMATED COUNT: 15.3 % (ref 11.5–14.5)
GLUCOSE SERPL-MCNC: 97 MG/DL (ref 74–99)
HCT VFR BLD AUTO: 30.6 % (ref 36–46)
HGB BLD-MCNC: 10 G/DL (ref 12–16)
HOLD SPECIMEN: NORMAL
HOLD SPECIMEN: NORMAL
IMM GRANULOCYTES # BLD AUTO: 0.21 X10*3/UL (ref 0–0.7)
IMM GRANULOCYTES NFR BLD AUTO: 1.5 % (ref 0–0.9)
LYMPHOCYTES # BLD AUTO: 1.57 X10*3/UL (ref 1.2–4.8)
LYMPHOCYTES NFR BLD AUTO: 11.2 %
MCH RBC QN AUTO: 25.4 PG (ref 26–34)
MCHC RBC AUTO-ENTMCNC: 32.7 G/DL (ref 32–36)
MCV RBC AUTO: 78 FL (ref 80–100)
MONOCYTES # BLD AUTO: 1.76 X10*3/UL (ref 0.1–1)
MONOCYTES NFR BLD AUTO: 12.6 %
NEUTROPHILS # BLD AUTO: 10.1 X10*3/UL (ref 1.2–7.7)
NEUTROPHILS NFR BLD AUTO: 72.1 %
NRBC BLD-RTO: 0 /100 WBCS (ref 0–0)
PHOSPHATE SERPL-MCNC: 4.2 MG/DL (ref 2.5–4.9)
PLATELET # BLD AUTO: 462 X10*3/UL (ref 150–450)
POTASSIUM SERPL-SCNC: 4.2 MMOL/L (ref 3.5–5.3)
RBC # BLD AUTO: 3.94 X10*6/UL (ref 4–5.2)
SODIUM SERPL-SCNC: 142 MMOL/L (ref 136–145)
WBC # BLD AUTO: 14 X10*3/UL (ref 4.4–11.3)

## 2024-03-14 PROCEDURE — 99233 SBSQ HOSP IP/OBS HIGH 50: CPT | Performed by: REGISTERED NURSE

## 2024-03-14 PROCEDURE — 2500000001 HC RX 250 WO HCPCS SELF ADMINISTERED DRUGS (ALT 637 FOR MEDICARE OP): Performed by: INTERNAL MEDICINE

## 2024-03-14 PROCEDURE — 3600000007 HC OR TIME - EACH INCREMENTAL 1 MINUTE - PROCEDURE LEVEL TWO: Performed by: SURGERY

## 2024-03-14 PROCEDURE — 3600000002 HC OR TIME - INITIAL BASE CHARGE - PROCEDURE LEVEL TWO: Performed by: SURGERY

## 2024-03-14 PROCEDURE — A4217 STERILE WATER/SALINE, 500 ML: HCPCS | Performed by: SURGERY

## 2024-03-14 PROCEDURE — 99239 HOSP IP/OBS DSCHRG MGMT >30: CPT | Performed by: HOSPITALIST

## 2024-03-14 PROCEDURE — 3700000001 HC GENERAL ANESTHESIA TIME - INITIAL BASE CHARGE: Performed by: SURGERY

## 2024-03-14 PROCEDURE — 36590 REMOVAL TUNNELED CV CATH: CPT | Performed by: STUDENT IN AN ORGANIZED HEALTH CARE EDUCATION/TRAINING PROGRAM

## 2024-03-14 PROCEDURE — 3700000002 HC GENERAL ANESTHESIA TIME - EACH INCREMENTAL 1 MINUTE: Performed by: SURGERY

## 2024-03-14 PROCEDURE — 87070 CULTURE OTHR SPECIMN AEROBIC: CPT | Mod: ELYLAB | Performed by: SURGERY

## 2024-03-14 PROCEDURE — 85025 COMPLETE CBC W/AUTO DIFF WBC: CPT | Performed by: INTERNAL MEDICINE

## 2024-03-14 PROCEDURE — 82565 ASSAY OF CREATININE: CPT | Performed by: INTERNAL MEDICINE

## 2024-03-14 PROCEDURE — 36590 REMOVAL TUNNELED CV CATH: CPT | Performed by: SURGERY

## 2024-03-14 PROCEDURE — 2500000004 HC RX 250 GENERAL PHARMACY W/ HCPCS (ALT 636 FOR OP/ED): Performed by: HOSPITALIST

## 2024-03-14 PROCEDURE — 2500000004 HC RX 250 GENERAL PHARMACY W/ HCPCS (ALT 636 FOR OP/ED): Performed by: NURSE ANESTHETIST, CERTIFIED REGISTERED

## 2024-03-14 PROCEDURE — 0JPT0XZ REMOVAL OF TUNNELED VASCULAR ACCESS DEVICE FROM TRUNK SUBCUTANEOUS TISSUE AND FASCIA, OPEN APPROACH: ICD-10-PCS | Performed by: SURGERY

## 2024-03-14 PROCEDURE — 2720000007 HC OR 272 NO HCPCS: Performed by: SURGERY

## 2024-03-14 PROCEDURE — 2500000004 HC RX 250 GENERAL PHARMACY W/ HCPCS (ALT 636 FOR OP/ED): Performed by: SURGERY

## 2024-03-14 PROCEDURE — 2500000005 HC RX 250 GENERAL PHARMACY W/O HCPCS: Performed by: SURGERY

## 2024-03-14 RX ORDER — METOCLOPRAMIDE HYDROCHLORIDE 5 MG/ML
10 INJECTION INTRAMUSCULAR; INTRAVENOUS ONCE AS NEEDED
Status: DISCONTINUED | OUTPATIENT
Start: 2024-03-14 | End: 2024-03-14 | Stop reason: HOSPADM

## 2024-03-14 RX ORDER — ACETAMINOPHEN 325 MG/1
650 TABLET ORAL EVERY 4 HOURS PRN
Status: DISCONTINUED | OUTPATIENT
Start: 2024-03-14 | End: 2024-03-14 | Stop reason: HOSPADM

## 2024-03-14 RX ORDER — MORPHINE SULFATE 2 MG/ML
1 INJECTION, SOLUTION INTRAMUSCULAR; INTRAVENOUS ONCE
Status: CANCELLED | OUTPATIENT
Start: 2024-03-14

## 2024-03-14 RX ORDER — ONDANSETRON HYDROCHLORIDE 2 MG/ML
4 INJECTION, SOLUTION INTRAVENOUS ONCE AS NEEDED
Status: DISCONTINUED | OUTPATIENT
Start: 2024-03-14 | End: 2024-03-14 | Stop reason: HOSPADM

## 2024-03-14 RX ORDER — SODIUM CHLORIDE, SODIUM LACTATE, POTASSIUM CHLORIDE, CALCIUM CHLORIDE 600; 310; 30; 20 MG/100ML; MG/100ML; MG/100ML; MG/100ML
INJECTION, SOLUTION INTRAVENOUS CONTINUOUS PRN
Status: DISCONTINUED | OUTPATIENT
Start: 2024-03-14 | End: 2024-03-14

## 2024-03-14 RX ORDER — LIDOCAINE HYDROCHLORIDE 10 MG/ML
0.1 INJECTION INFILTRATION; PERINEURAL ONCE
Status: DISCONTINUED | OUTPATIENT
Start: 2024-03-14 | End: 2024-03-14 | Stop reason: HOSPADM

## 2024-03-14 RX ORDER — PROPOFOL 10 MG/ML
INJECTION, EMULSION INTRAVENOUS AS NEEDED
Status: DISCONTINUED | OUTPATIENT
Start: 2024-03-14 | End: 2024-03-14

## 2024-03-14 RX ORDER — FENTANYL CITRATE 50 UG/ML
12.5 INJECTION, SOLUTION INTRAMUSCULAR; INTRAVENOUS EVERY 5 MIN PRN
Status: DISCONTINUED | OUTPATIENT
Start: 2024-03-14 | End: 2024-03-14 | Stop reason: HOSPADM

## 2024-03-14 RX ORDER — SODIUM CHLORIDE 0.9 G/100ML
IRRIGANT IRRIGATION AS NEEDED
Status: DISCONTINUED | OUTPATIENT
Start: 2024-03-14 | End: 2024-03-14 | Stop reason: HOSPADM

## 2024-03-14 RX ORDER — FENTANYL CITRATE 50 UG/ML
25 INJECTION, SOLUTION INTRAMUSCULAR; INTRAVENOUS EVERY 5 MIN PRN
Status: DISCONTINUED | OUTPATIENT
Start: 2024-03-14 | End: 2024-03-14 | Stop reason: HOSPADM

## 2024-03-14 RX ORDER — BUPIVACAINE HYDROCHLORIDE 2.5 MG/ML
INJECTION, SOLUTION INFILTRATION; PERINEURAL AS NEEDED
Status: DISCONTINUED | OUTPATIENT
Start: 2024-03-14 | End: 2024-03-14 | Stop reason: HOSPADM

## 2024-03-14 RX ORDER — SODIUM CHLORIDE, SODIUM LACTATE, POTASSIUM CHLORIDE, CALCIUM CHLORIDE 600; 310; 30; 20 MG/100ML; MG/100ML; MG/100ML; MG/100ML
100 INJECTION, SOLUTION INTRAVENOUS CONTINUOUS
Status: DISCONTINUED | OUTPATIENT
Start: 2024-03-14 | End: 2024-03-14 | Stop reason: HOSPADM

## 2024-03-14 RX ADMIN — PROPOFOL 50 MG: 10 INJECTION, EMULSION INTRAVENOUS at 12:24

## 2024-03-14 RX ADMIN — SODIUM CHLORIDE, SODIUM LACTATE, POTASSIUM CHLORIDE, AND CALCIUM CHLORIDE: 600; 310; 30; 20 INJECTION, SOLUTION INTRAVENOUS at 12:13

## 2024-03-14 RX ADMIN — PROPOFOL 50 MG: 10 INJECTION, EMULSION INTRAVENOUS at 12:28

## 2024-03-14 RX ADMIN — PROPOFOL 100 MG: 10 INJECTION, EMULSION INTRAVENOUS at 12:21

## 2024-03-14 RX ADMIN — DEXTROSE MONOHYDRATE 2 G: 5 INJECTION INTRAVENOUS at 11:28

## 2024-03-14 RX ADMIN — HYDROCODONE BITARTRATE AND ACETAMINOPHEN 1 TABLET: 5; 325 TABLET ORAL at 05:18

## 2024-03-14 SDOH — HEALTH STABILITY: MENTAL HEALTH: CURRENT SMOKER: 0

## 2024-03-14 ASSESSMENT — PAIN SCALES - GENERAL: PAIN_LEVEL: 0

## 2024-03-14 NOTE — OP NOTE
Removal Central Venous Catheter (R) Operative Note     Date: 3/8/2024 - 3/14/2024  OR Location: [facility]    Name: Radha Mcmahon, : 1965, Age: 58 y.o., MRN: [id number], Sex: female    Diagnosis  Pre-op Diagnosis     * Bacteremia associated with intravascular line, initial encounter (CMS/[facility]) [T82.7XXA, R78.81] Post-op Diagnosis     * Bacteremia associated with intravascular line, initial encounter (CMS/[facility]) [T82.7XXA, R78.81]     Procedures  Removal Central Venous Catheter  [id number] - PA RMVL JULIANO CTR VAD W/SUBQ PORT/ CTR/[facility] INSJ      Surgeons      * Manpreet Rao - Primary    Resident/Fellow/Other Assistant:  Surgeon(s) and Role:     * Lizet Paniagua PA-C - Assisting    Procedure Summary  Anesthesia: * No anesthesia type entered *  ASA: III  Anesthesia Staff: Anesthesiologist: Senait Ospina MD  CRNA: JENNIFER Cheung-CRNA  Estimated Blood Loss:  Minimal  Intra-op Medications:   Administrations occurring from 1130 to 1200 on 24:   Medication Name Total Dose   cefTRIAXone (Rocephin) in dextrose 5 % water (D5W) 50 mL IV 2 g Cannot be calculated   insulin lispro (HumaLOG) injection 0-5 Units Cannot be calculated              Anesthesia Record               Intraprocedure I/O Totals       None           Specimen: No specimens collected     Staff:   Circulator: Simran Gaines RN  Scrub Person: Stefanie Fernando         Drains and/or Catheters: * None in log *    Tourniquet Times:         Implants:     Findings: Excision of R internal jugular mediport, tip sent for culture    Indications: Radha Mcmahon is an 58 y.o. female who is having surgery for Bacteremia associated with intravascular line, initial encounter (CMS/[facility]) [T82.7XXA, R78.81].   In brief this is a 58-year-old female with past medical history significant for diabetes, hypertension, hyperlipidemia, COPD, SCC of left lung status post chemotherapy who is currently in the hospital for pneumonia, she is 4 out of 4 blood  cultures positive for strep pneumo.  There is a concern that her Mediport that has been in place since 2019 had become infected.  Given she was not receiving any more chemotherapy and had not for some time, determine the best next course which is the to remove the Mediport.  The patient explained the risk [facility], alternatives and she consented to the procedure above.        The patient was seen in the preoperative area. The risks, benefits, complications, treatment options, non-operative alternatives, expected recovery and outcomes were discussed with the patient. The possibilities of reaction to medication, pulmonary aspiration, injury to surrounding structures, bleeding, recurrent infection, the need for additional procedures, failure to diagnose a condition, and creating a complication requiring transfusion or operation were discussed with the patient. The patient concurred with the proposed plan, giving informed consent.  The site of surgery was properly noted/marked if necessary per policy. The patient has been actively warmed in preoperative area. Preoperative antibiotics have been ordered and given within 1 hours of incision. Venous thrombosis prophylaxis have been ordered including bilateral sequential compression devices    Procedure Details:   She was brought back to the operating room.  She is placed in supine position.  Huddle was performed indicating correct patient, procedure, laterality.  Patient underwent MAC anesthesia.  SCDs were placed and patient working condition.  Patient was already on therapeutic antibiotics.  Patient had already received DVT prophylaxis.  Patient was prepped and draped in usual sterile fashion.  A time out was performed indicating the correct patient, procedure, laterality.    The patient was anesthetized using quarter percent Marcaine.  An incision was made over her old scar.  Electrocautery was used to dissect out the cavity.  The Mediport was seen.  It was grasped and  taken out easily from the neck.  The tip of the Mediport was cut off and sent off for culture.  Pressure was held for 5 minutes.  After this was done, hemostasis was ensured.  The old capsule was removed to ensure that there would be no recurrent seroma.  The subdermal layer was closed using interrupted 3-0 Vicryl and skin was closed using running 4-0 Monocryl.  Surgical glue was placed on top.  She was awoken and brought back to covering the condition.  The patient tolerated procedure well.  The count was correct.  I was present for the entirety of the procedure.      Complications:  None; patient tolerated the procedure well.    Disposition: PACU - hemodynamically stable.  Condition: stable         Additional Details: None    Attending Attestation: I was present and scrubbed for the entire procedure.    Manpreet Rao  Phone Number: [phone]

## 2024-03-14 NOTE — PROGRESS NOTES
General Surgery Progress Note    Patient: Radha Mcmahon  Unit/Bed: 1004/1004-B  YOB: 1965  MRN: [id number]  Acct: [id number]   Admitting Diagnosis: Pneumonia, unspecified organism [J18.9]  Date:  3/8/2024  Hospital Day: 6  Attending: Ilda Burger*    Complaint:  Chief Complaint   Patient presents with    Chest Pain     Gradually worsening over the past few days. Left sided.       Subjective  Patient seen and examined this morning. No acute events overnight. Plan for OR today for mediport removal.     PHYSICAL EXAM:  Physical Exam  Vitals reviewed.   Constitutional:       Appearance: Normal appearance.   HENT:      Head: Normocephalic.      Nose: Nose normal.      Mouth/Throat:      Mouth: Mucous membranes are moist.   Cardiovascular:      Rate and Rhythm: Normal rate.   Pulmonary:      Effort: Pulmonary effort is normal.   Chest:      Comments: Right chest mediport  Abdominal:      General: Abdomen is flat. Bowel sounds are normal.      Palpations: Abdomen is soft.   Skin:     General: Skin is warm.      Capillary Refill: Capillary refill takes less than 2 seconds.   Neurological:      General: No focal deficit present.      Mental Status: She is alert and oriented to person, place, and time.   Psychiatric:         Mood and Affect: Mood normal.       Vital signs in last 24 hours:  Vitals:    03/14/24 0754   BP: 130/72   Pulse: 68   Resp:    Temp: 35.7 °C (96.3 °F)   SpO2: 99%     Intake/Output this shift:    Intake/Output Summary (Last 24 hours) at 3/14/2024 0805  Last data filed at 3/13/2024 1002  Gross per 24 hour   Intake 775 ml   Output --   Net 775 ml      Allergies:  Allergies   Allergen Reactions    Morphine Itching      Medications:  Scheduled medications  amLODIPine, 10 mg, oral, Daily  cefTRIAXone, 2 g, intravenous, q24h  enoxaparin, 30 mg, subcutaneous, q24h  insulin lispro, 0-5 Units, subcutaneous, TID with meals  polyethylene glycol, 17 g, oral, Daily      Continuous  medications  lactated Ringer's, 75 mL/hr, Last Rate: 75 mL/hr (03/13/24 0415)      PRN medications  PRN medications: acetaminophen, dextrose 10 % in water (D10W), dextrose, glucagon, HYDROcodone-acetaminophen  Labs:  Results for orders placed or performed during the hospital encounter of 03/08/24 (from the past 24 hour(s))   CBC and Auto Differential   Result Value Ref Range    WBC 13.2 (H) 4.4 - 11.3 x10*3/uL    nRBC 0.0 0.0 - 0.0 /100 WBCs    RBC 3.97 (L) 4.00 - 5.20 x10*6/uL    Hemoglobin 10.3 (L) 12.0 - 16.0 g/dL    Hematocrit 31.1 (L) 36.0 - 46.0 %    MCV 78 (L) 80 - 100 fL    MCH 25.9 (L) 26.0 - 34.0 pg    MCHC 33.1 32.0 - 36.0 g/dL    RDW 15.8 (H) 11.5 - 14.5 %    Platelets 417 150 - 450 x10*3/uL    Neutrophils % 72.0 40.0 - 80.0 %    Immature Granulocytes %, Automated 1.6 (H) 0.0 - 0.9 %    Lymphocytes % 11.1 13.0 - 44.0 %    Monocytes % 12.2 2.0 - 10.0 %    Eosinophils % 2.6 0.0 - 6.0 %    Basophils % 0.5 0.0 - 2.0 %    Neutrophils Absolute 9.54 (H) 1.20 - 7.70 x10*3/uL    Immature Granulocytes Absolute, Automated 0.21 0.00 - 0.70 x10*3/uL    Lymphocytes Absolute 1.47 1.20 - 4.80 x10*3/uL    Monocytes Absolute 1.61 (H) 0.10 - 1.00 x10*3/uL    Eosinophils Absolute 0.35 0.00 - 0.70 x10*3/uL    Basophils Absolute 0.06 0.00 - 0.10 x10*3/uL   Renal Function Panel   Result Value Ref Range    Glucose 149 (H) 74 - 99 mg/dL    Sodium 141 136 - 145 mmol/L    Potassium 4.4 3.5 - 5.3 mmol/L    Chloride 105 98 - 107 mmol/L    Bicarbonate 28 21 - 32 mmol/L    Anion Gap 12 10 - 20 mmol/L    Urea Nitrogen 20 6 - 23 mg/dL    Creatinine 2.12 (H) 0.50 - 1.05 mg/dL    eGFR 26 (L) >60 mL/min/1.73m*2    Calcium 9.3 8.6 - 10.3 mg/dL    Phosphorus 4.3 2.5 - 4.9 mg/dL    Albumin 2.8 (L) 3.4 - 5.0 g/dL   Magnesium   Result Value Ref Range    Magnesium 1.85 1.60 - 2.40 mg/dL   Light Blue Top   Result Value Ref Range    Extra Tube Hold for add-ons.    SST TOP   Result Value Ref Range    Extra Tube Hold for add-ons.    Blood Culture     Specimen: Peripheral Venipuncture; Blood culture   Result Value Ref Range    Blood Culture Loaded on Instrument - Culture in progress    CBC and Auto Differential   Result Value Ref Range    WBC 14.0 (H) 4.4 - 11.3 x10*3/uL    nRBC 0.0 0.0 - 0.0 /100 WBCs    RBC 3.94 (L) 4.00 - 5.20 x10*6/uL    Hemoglobin 10.0 (L) 12.0 - 16.0 g/dL    Hematocrit 30.6 (L) 36.0 - 46.0 %    MCV 78 (L) 80 - 100 fL    MCH 25.4 (L) 26.0 - 34.0 pg    MCHC 32.7 32.0 - 36.0 g/dL    RDW 15.3 (H) 11.5 - 14.5 %    Platelets 462 (H) 150 - 450 x10*3/uL    Neutrophils % 72.1 40.0 - 80.0 %    Immature Granulocytes %, Automated 1.5 (H) 0.0 - 0.9 %    Lymphocytes % 11.2 13.0 - 44.0 %    Monocytes % 12.6 2.0 - 10.0 %    Eosinophils % 2.1 0.0 - 6.0 %    Basophils % 0.5 0.0 - 2.0 %    Neutrophils Absolute 10.10 (H) 1.20 - 7.70 x10*3/uL    Immature Granulocytes Absolute, Automated 0.21 0.00 - 0.70 x10*3/uL    Lymphocytes Absolute 1.57 1.20 - 4.80 x10*3/uL    Monocytes Absolute 1.76 (H) 0.10 - 1.00 x10*3/uL    Eosinophils Absolute 0.30 0.00 - 0.70 x10*3/uL    Basophils Absolute 0.07 0.00 - 0.10 x10*3/uL   Renal Function Panel   Result Value Ref Range    Glucose 97 74 - 99 mg/dL    Sodium 142 136 - 145 mmol/L    Potassium 4.2 3.5 - 5.3 mmol/L    Chloride 105 98 - 107 mmol/L    Bicarbonate 30 21 - 32 mmol/L    Anion Gap 11 10 - 20 mmol/L    Urea Nitrogen 20 6 - 23 mg/dL    Creatinine 1.99 (H) 0.50 - 1.05 mg/dL    eGFR 28 (L) >60 mL/min/1.73m*2    Calcium 9.0 8.6 - 10.3 mg/dL    Phosphorus 4.2 2.5 - 4.9 mg/dL    Albumin 2.8 (L) 3.4 - 5.0 g/dL   Light Blue Top   Result Value Ref Range    Extra Tube Hold for add-ons.       Imaging:  No results found.     Assessment  Right chest mediport removal      Plan  -NPO  -Pain control  -Nausea control  -DVT prophylaxis-per primary team  -Pulmonary toileting   -Encourage ambulation  -Continue care per primary team   -Plan to OR for Mediport removal today, with general surgery signing off if procedure has no complications.  knee and back pain       Further recommendations per Dr. Maira Lea, APRN-CNP    Time spent  37  minutes obtaining labs, imaging, recommendations, interview, assessment, examination, medication review/ordering, and EMR review.    Plan of care was discussed extensively with patient. Patient verbalized understanding through teach back method. All questions and concerns addressed upon examination.     Of note, this documentation is completed using the Dragon Dictation system (voice recognition software). There may be spelling and/or grammatical errors that were not corrected prior to final submission.

## 2024-03-14 NOTE — ANESTHESIA PREPROCEDURE EVALUATION
Patient: Radha Mcmahon    Procedure Information       Date/Time: 03/14/24 5480    Procedure: Removal Central Venous Catheter (Right)    Location: [facility] / [facility]    Surgeons: Manpreet Rao MD            Relevant Problems   Cardiovascular   (+) Primary hypertension      Endocrine   (+) Diabetes mellitus, type 2 (CMS/HCC)      /Renal   (+) Acute renal failure with tubular necrosis (CMS/HCC)      Pulmonary  L pleural effusion, h/o SCC of lung s/p chemo/rads   (+) Chronic obstructive pulmonary disease (CMS/HCC)   (+) Pneumonia, unspecified organism      Hematology   (+) Anemia      Infectious Disease   (+) Pneumonia, unspecified organism      Infectious Diseases   (+) Bacteremia associated with intravascular line (CMS/HCC)       Clinical information reviewed:    Allergies  Meds   Med Hx  Surg Hx   Fam Hx  Soc Hx        NPO Detail:  No data recorded     Physical Exam    Airway  Mallampati: III  TM distance: >3 FB  Neck ROM: full     Cardiovascular   Rhythm: regular  Rate: normal     Dental - normal exam     Pulmonary   (+) decreased breath sounds     Abdominal - normal exam             Anesthesia Plan    History of general anesthesia?: yes  History of complications of general anesthesia?: no    ASA 3     MAC   (MAC)  The patient is not a current smoker.    intravenous induction   Anesthetic plan and risks discussed with patient.  Use of blood products discussed with patient who.    Plan discussed with CRNA and attending.

## 2024-03-14 NOTE — ANESTHESIA POSTPROCEDURE EVALUATION
Patient: Radha Mcmahon    Procedure Summary       Date: 03/14/24 Room / Location: [facility] / [facility]    Anesthesia Start: 1213 Anesthesia Stop: 1241    Procedure: Removal Central Venous Catheter (Right: Chest) Diagnosis:       Bacteremia associated with intravascular line, initial encounter (CMS/[facility])      (Bacteremia associated with intravascular line, initial encounter (CMS/[facility]) [T82.7XXA, R78.81])    Surgeons: Manpreet Rao MD Responsible Provider: Senait Ospina MD    Anesthesia Type: MAC ASA Status: 3            Anesthesia Type: MAC    Vitals Value Taken Time   /79 03/14/24 1242   Temp 36.7 03/14/24 1242   Pulse 82 03/14/24 1242   Resp 18 03/14/24 1242   SpO2 100 03/14/24 1242       Anesthesia Post Evaluation    Patient location during evaluation: floor  Patient participation: complete - patient participated  Level of consciousness: awake and alert  Pain score: 0  Pain management: adequate  Airway patency: patent  Cardiovascular status: acceptable and stable  Respiratory status: acceptable and nasal cannula  Hydration status: acceptable  Postoperative Nausea and Vomiting: none      No notable events documented.

## 2024-03-14 NOTE — NURSING NOTE
Pt spoke with Dr. Cruz who instructed pt that she needed a PICC line. Pt adamantly refused and stated she was leaving AMA. Dr. Cruz explained risks of leaving without PICC ine and antibiotics including worsening of infection, sepsis, and death. Pt stated she did not care and she was leaving. Pt then called her daughter to come get her and stated she would not sign AMA form until daughter gets here. Daughter was very upset upon arrival and stated everything wrong with her mother was our fault. This nurse mentioned to daughter that pt frequently refuses labs, glucose checks, vitals, and other treatments which can inhibit proper care. She stated it was still our fault and she was taking pt to [facility]. She then refused to sign AMA form and requested to speak with floor managers RIAZ Adames and RIAZ Wilkerson. When they entered, daughter stated she had no idea why they were there because she did not ask to speak with them. Pt was taken via wheelchair to car.

## 2024-03-14 NOTE — PROGRESS NOTES
Spoke with patient regarding midline placement and  need for outpatient infusion as patient has not yet established a PCP. Patient states she is not going to get a midline and she is not going to do outpatient infusion. She states her daughter is coming to pick her up, she is going home. Patient states she is to see a Dr. Galindo at [facility], she will let them determine what line needs to be put in and antibiotics. I advised patient she did have that right. Spoke with patient's PCP, Dr. Cruz, she states she explained this to the patient, if she refuses she will need to go out AMA. Also called and left a message with patient's daughter Marialuisa Mcmahon [phone] updated her on what patient wants to do.

## 2024-03-14 NOTE — DISCHARGE SUMMARY
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 Mcmahon is a 58-year-old female with past medical history of COPD, history of an abdominal wall soft tissue mass which showed poorly differentiated carcinoma with squamous cell features status post concurrent chemo and radiation, who presented to the emergency room with chest pain and shortness of breath.    On admission, she had a CT angiogram of the chest which showed no evidence of pulmonary embolism but it did show left-sided pleural effusion with volume loss and mediastinal shift.    She was empirically started on vancomycin and Zosyn for pneumonia.  She was seen by pulmonology, who reviewed CT findings from the [facility] that was done on February 15 which showed stable effusion.  Currently pulmonology felt that she would not be a candidate for endobronchial stenting as well as a thoracentesis.  She remained stable on supplemental oxygen.    On admission, her blood cultures were positive for streptococcal bacteremia.  Her antibiotics were de-escalated to ceftriaxone IV.  She was seen by infectious disease, who felt that the port should be removed.  She underwent port removal.  The plan was to continue antibiotics for bacteremia.  She was to have a midline placed for IV antibiotics, however refused.  Repeat blood cultures done on 313 showed no growth.    She was also noted to have acute kidney injury multifactorial from ATN related to infection, and antibiotics.  Her overall kidney function improved with IV hydration.  Her ultrasound showed no evidence of hydronephrosis    Unfortunately, after her port was removed, patient was adamant about leaving.  I did advise her that she would need  IV antibiotics which patient was not prepared to do.  She was explained the risks of leaving including worsening of her condition and death which she understood.    During this hospitalization I did talk to her daughter to give her daily updates.      Total time spent on discharge planning and coordination of care 40 minutes.      * Some of these notes were completed using Dragon voice recognition technology and may include unintended areas with respect to translation of word, typographical errors or primary areas which may not have been identified prior to finalization of the document.        DISCHARGE PHYSICAL EXAM     Last Recorded Vitals:  Vitals:    03/14/24 1349 03/14/24 1404 03/14/24 1445 03/14/24 1515   BP: 128/67 161/77 176/83 114/61   BP Location: Right arm Right arm Right arm Right arm   Patient Position: Lying Lying Lying Lying   Pulse: 63 60 71 80   Resp: 14 14 14 14   Temp:       TempSrc:       SpO2: 96% 99% 99% 93%   Weight:       Height:           Physical Exam  PHYSICAL EXAM:   GENERAL: Laying in bed, does not appear to be in any distress.   HEENT: HEAD: Normocephalic atraumatic.  Neck: Supple.  Eyes: Pupils are reactive to direct light.   CVS: S1, S2 heard. Regular rate and rhythm  LUNGS: Minimal coarse breath sounds appreciated.  ABDOMEN: Soft, nontender to palpate. Positive bowel sounds. No guarding or rebound appreciated.  NEUROLOGICAL: No focal neurological deficits appreciated. Cranial nerves are grossly intact.  EXTREMITIES:  No edema appreciated.  SKIN:  Grossly intact, warm and dry.    DISCHARGE MEDICATIONS        Your medication list      as of March 14, 2024  5:05 PM     You have not been prescribed any medications.           OUTPATIENT FOLLOW-UP     No future appointments.

## 2024-03-14 NOTE — NURSING NOTE
Midline order was received and per Dr. Cruz, patient is to DC later today.  I messaged Juliana Jaimes RN [facility], and she states that patient will need to be setup as outpatient as she does not have a PCP.  I have now been informed patient refused IV antibiotic therapy and is singing out AMA and midline is not needed.

## 2024-03-14 NOTE — PROGRESS NOTES
"Renal Progress Note    Assessment and Plan:   58 y.o. yo female admitted with shortness of breath.  She has underlying diabetes mellitus and hypertension.  Has history of lung cancer status post chemo and radiation.  Has left-sided pleural effusion.  She was on losartan HCT, Farxiga and amlodipine at home.  Kidney function was normal on admission at 0.5 creatinine.  Developed acute kidney injury that appears to be ATN.  The ATN is either from the contrast versus effects of antibiotics vancomycin and Zosyn.  Also could be a combination of both.  Urinalysis is negative for any signs of glomerular disease.     Plan/  Antibiotics have already been changed.  She is on IV fluids can discontinue them  Renal ultrasound negative  GFR imprvoing  Ok for discharge once port removed and abx finalized  Keep losartan/hydrochlorothiazide and farxiga on hold until seen as outpt and gfr back to normal     Outpatient follow up from renal standpoint: Tamika 2-3 weeks (she doesn't have established primary - oitherwise would just have follow up  with primary    Subjective:   Admit Date: 3/8/2024    Interval History: cr improving  Renal ultrasound ok.  For port removal      Medications:   Scheduled Meds:amLODIPine, 10 mg, oral, Daily  cefTRIAXone, 2 g, intravenous, q24h  enoxaparin, 30 mg, subcutaneous, q24h  insulin lispro, 0-5 Units, subcutaneous, TID with meals  polyethylene glycol, 17 g, oral, Daily      Continuous Infusions:lactated Ringer's, 75 mL/hr, Last Rate: 75 mL/hr (03/13/24 0615)        CBC:   Lab Results   Component Value Date    HGB 10.0 (L) 03/14/2024    HGB 10.3 (L) 03/13/2024    WBC 14.0 (H) 03/14/2024    WBC 13.2 (H) 03/13/2024     (H) 03/14/2024     03/13/2024      Anemia:  No results found for: \"FERRITIN\", \"IRON\", \"TIBC\"   BMP:    Lab Results   Component Value Date     03/14/2024     03/13/2024    K 4.2 03/14/2024    K 4.4 03/13/2024     03/14/2024     03/13/2024    CO2 30 " "03/14/2024    CO2 28 03/13/2024    BUN 20 03/14/2024    BUN 20 03/13/2024    CREATININE 1.99 (H) 03/14/2024    CREATININE 2.12 (H) 03/13/2024      Bone disease:   Lab Results   Component Value Date    PHOS 4.2 03/14/2024      Urinalysis:    No results found for: \"STU\", \"PROTUR\", \"GLUCOSEU\", \"BLOODU\", \"KETONESU\", \"BILIRUBINU\", \"NITRITEU\", \"LEUKOCYTESU\", \"UTPCR\"       Objective:   Vitals: /72 (BP Location: Right arm, Patient Position: Lying)   Pulse 68   Temp 35.7 °C (96.3 °F) (Temporal)   Resp 16   Ht 1.549 m (5' 1\")   Wt 78.5 kg (173 lb 1 oz)   SpO2 99%   BMI 32.70 kg/m²    Wt Readings from Last 3 Encounters:   03/08/24 78.5 kg (173 lb 1 oz)   01/28/24 83 kg (183 lb)      24HR INTAKE/OUTPUT:    Intake/Output Summary (Last 24 hours) at 3/14/2024 0844  Last data filed at 3/13/2024 1002  Gross per 24 hour   Intake 775 ml   Output --   Net 775 ml       Admission weight:  Weight: 79.4 kg (175 lb)      Constitutional:  Alert, awake, no apparent distress   Skin:normal, no rash  HEENT:sclera anicteric.  Head atraumatic normocephalic  Neck:supple with no thyromegally  Cardiovascular:  S1, S2 without m/r/g   Respiratory:  CTA B without w/r/r   Abdomen: +bs, soft, nt  Ext: no LE edema  Musculoskeletal:Intact  Neuro:Alert and oriented with no deficit      Electronically signed by Alin Lundberg MD on 3/14/2024 at 8:44 AM            "

## 2024-03-14 NOTE — BRIEF OP NOTE
Date: 3/8/2024 - 3/14/2024  OR Location: [facility]    Name: Radha Mcmahon, : 1965, Age: 58 y.o., MRN: [id number], Sex: female    Diagnosis  Pre-op Diagnosis     * Bacteremia associated with intravascular line, initial encounter (CMS/[facility]) [T82.7XXA, R78.81] Post-op Diagnosis     * Bacteremia associated with intravascular line, initial encounter (CMS/[facility]) [T82.7XXA, R78.81]     Procedures  Removal Central Venous Catheter  [id number] - CT RMVL [facility] CTR VAD W/SUBQ PORT/ CTR/PRPH INSJ      Surgeons      * Manpreet Rao - Primary    Resident/Fellow/Other Assistant:  Surgeon(s) and Role:     * Lizet Paniagua PA-C - Assisting    Procedure Summary  Anesthesia: * No anesthesia type entered *  ASA: III  Anesthesia Staff: Anesthesiologist: Senait Ospina MD  CRNA: JENNIFER Cheung-CRNA  Estimated Blood Loss:  Minimal  Intra-op Medications:   Administrations occurring from 1130 to 1200 on 24:   Medication Name Total Dose   cefTRIAXone (Rocephin) in dextrose 5 % water (D5W) 50 mL IV 2 g Cannot be calculated   insulin lispro (HumaLOG) injection 0-5 Units Cannot be calculated              Anesthesia Record               Intraprocedure I/O Totals       None           Specimen: No specimens collected     Staff:   Circulator: Simran Gaines RN  Scrub Person: Stefanie Fernando          Findings: Excision of R internal jugular mediport, tip sent for culture    Complications:  None; patient tolerated the procedure well.     Disposition: PACU - hemodynamically stable.  Condition: stable  Specimens Collected: No specimens collected  Attending Attestation: I was present and scrubbed for the entire procedure.    Manpreet Rao  Phone Number: [phone]

## 2024-03-16 LAB
BACTERIA SPEC CULT: NORMAL
GRAM STN SPEC: NORMAL
GRAM STN SPEC: NORMAL

## 2024-03-17 LAB — BACTERIA BLD CULT: NORMAL

## 2024-12-18 ENCOUNTER — HOSPITAL ENCOUNTER (EMERGENCY)
Facility: HOSPITAL | Age: 59
Discharge: HOME | End: 2024-12-18
Payer: COMMERCIAL

## 2024-12-18 VITALS
WEIGHT: 176 LBS | SYSTOLIC BLOOD PRESSURE: 141 MMHG | HEART RATE: 63 BPM | HEIGHT: 61 IN | BODY MASS INDEX: 33.23 KG/M2 | DIASTOLIC BLOOD PRESSURE: 77 MMHG | TEMPERATURE: 97.9 F | RESPIRATION RATE: 16 BRPM | OXYGEN SATURATION: 97 %

## 2024-12-18 DIAGNOSIS — R21 RASH: Primary | ICD-10-CM

## 2024-12-18 PROCEDURE — 99283 EMERGENCY DEPT VISIT LOW MDM: CPT

## 2024-12-18 PROCEDURE — 2500000004 HC RX 250 GENERAL PHARMACY W/ HCPCS (ALT 636 FOR OP/ED): Performed by: NURSE PRACTITIONER

## 2024-12-18 PROCEDURE — 2500000001 HC RX 250 WO HCPCS SELF ADMINISTERED DRUGS (ALT 637 FOR MEDICARE OP): Performed by: NURSE PRACTITIONER

## 2024-12-18 RX ORDER — FAMOTIDINE 20 MG/1
20 TABLET, FILM COATED ORAL ONCE
Status: COMPLETED | OUTPATIENT
Start: 2024-12-18 | End: 2024-12-18

## 2024-12-18 RX ORDER — DIPHENHYDRAMINE HCL 25 MG
25 TABLET ORAL ONCE
Status: COMPLETED | OUTPATIENT
Start: 2024-12-18 | End: 2024-12-18

## 2024-12-18 RX ORDER — PREDNISONE 20 MG/1
60 TABLET ORAL ONCE
Status: COMPLETED | OUTPATIENT
Start: 2024-12-18 | End: 2024-12-18

## 2024-12-18 RX ORDER — FAMOTIDINE 20 MG/1
20 TABLET, FILM COATED ORAL 2 TIMES DAILY PRN
Qty: 30 TABLET | Refills: 0 | Status: SHIPPED | OUTPATIENT
Start: 2024-12-18 | End: 2025-01-02

## 2024-12-18 RX ORDER — DIPHENHYDRAMINE HCL 25 MG
25 TABLET ORAL EVERY 6 HOURS PRN
Qty: 20 CAPSULE | Refills: 0 | Status: SHIPPED | OUTPATIENT
Start: 2024-12-18 | End: 2024-12-28

## 2024-12-18 RX ORDER — PREDNISONE 20 MG/1
40 TABLET ORAL DAILY
Qty: 8 TABLET | Refills: 0 | Status: SHIPPED | OUTPATIENT
Start: 2024-12-18 | End: 2024-12-22

## 2024-12-18 ASSESSMENT — COLUMBIA-SUICIDE SEVERITY RATING SCALE - C-SSRS
1. IN THE PAST MONTH, HAVE YOU WISHED YOU WERE DEAD OR WISHED YOU COULD GO TO SLEEP AND NOT WAKE UP?: NO
6. HAVE YOU EVER DONE ANYTHING, STARTED TO DO ANYTHING, OR PREPARED TO DO ANYTHING TO END YOUR LIFE?: YES
2. HAVE YOU ACTUALLY HAD ANY THOUGHTS OF KILLING YOURSELF?: NO
6. HAVE YOU EVER DONE ANYTHING, STARTED TO DO ANYTHING, OR PREPARED TO DO ANYTHING TO END YOUR LIFE?: NO

## 2024-12-18 ASSESSMENT — PAIN SCALES - GENERAL
PAINLEVEL_OUTOF10: 0 - NO PAIN
PAINLEVEL_OUTOF10: 0 - NO PAIN

## 2024-12-18 ASSESSMENT — PAIN - FUNCTIONAL ASSESSMENT: PAIN_FUNCTIONAL_ASSESSMENT: 0-10

## 2024-12-18 NOTE — ED PROVIDER NOTES
HPI   Chief Complaint   Patient presents with    Itching     Itching since yesterday, started on my side with a rash and now it's even on my breasts    Rash       Patient is a healthy nontoxic-appearing 59-year-old female with past medical history of pneumonia, hypertension, type 2 diabetes, anemia, acute renal failure, COPD, bacteremia, presents the emergency room today for complaint of itching rash.  Patient states rash began yesterday.  Patient states she was seen evaluated for similar rash in the past and was seen by dermatology.  Patient states she was told to change soap to Dove which she did.  Patient states she has had no further changes in lotions, soaps, detergents or medication or dietary changes.  Patient states rash began yesterday is across the bilateral breast and left hip.  Patient denies any shortness of breath difficulty breathing, sensation of airway closing, chest pain, palpitations, lightness or dizziness, syncopal or syncopal events, headache pain, abdominal pain with nausea, vomiting, diarrhea or constipation, fever, shaking, or chills.              Patient History   No past medical history on file.  No past surgical history on file.  No family history on file.  Social History     Tobacco Use    Smoking status: Former     Types: Cigarettes    Smokeless tobacco: Never   Substance Use Topics    Alcohol use: Not on file    Drug use: Not on file       Physical Exam   ED Triage Vitals [12/18/24 0335]   Temperature Heart Rate Respirations BP   36.6 °C (97.9 °F) 86 18 144/77      Pulse Ox Temp Source Heart Rate Source Patient Position   98 % Temporal Monitor Sitting      BP Location FiO2 (%)     Right arm --       Physical Exam  Vitals and nursing note reviewed.   Constitutional:       General: She is not in acute distress.     Appearance: Normal appearance. She is not ill-appearing, toxic-appearing or diaphoretic.   HENT:      Head: Normocephalic.   Eyes:      General:         Right eye: No  discharge.         Left eye: No discharge.      Extraocular Movements: Extraocular movements intact.      Pupils: Pupils are equal, round, and reactive to light.   Cardiovascular:      Rate and Rhythm: Normal rate and regular rhythm.      Pulses: Normal pulses.      Heart sounds: Normal heart sounds. No murmur heard.     No friction rub. No gallop.   Pulmonary:      Effort: Pulmonary effort is normal. No respiratory distress.      Breath sounds: Normal breath sounds. No stridor. No wheezing, rhonchi or rales.   Chest:      Chest wall: No tenderness.   Musculoskeletal:         General: No swelling, tenderness, deformity or signs of injury. Normal range of motion.      Cervical back: Normal range of motion and neck supple.      Right lower leg: No edema.      Left lower leg: No edema.   Skin:     General: Skin is warm.      Capillary Refill: Capillary refill takes less than 2 seconds.      Coloration: Skin is not jaundiced or pale.      Findings: Erythema present. No bruising, lesion or rash.      Comments: Nonraised erythematous rash present across the bilateral breast and left lateral abdomen.  No hives present, no underlying fluctuance, induration, active bleeding or drainage present, several scratch marks are present across the bilateral breasts.   Neurological:      General: No focal deficit present.      Mental Status: She is alert and oriented to person, place, and time.           ED Course & MDM   Diagnoses as of 12/18/24 0544   Rash                 No data recorded     [place] Coma Scale Score: 15 (12/18/24 0335 : Luanne Owens RN)                           Medical Decision Making  Given patient's complaint presentation a thorough exam was performed.  On exam patient has Nonraised erythematous rash present across the bilateral breast and left lateral abdomen.  No hives present, no underlying fluctuance, induration, active bleeding or drainage present, several scratch marks are present across the  bilateral breasts.  No stridor or wheezing auscultated over the neck, managing secretions appropriately, no adventitious lung sounds auscultated, speaking complete sentences no respiratory distress, I have a low suspicion for anaphylaxis, airway compromise, cellulitis, shingles.  I suspect patient is experiencing dermatitis to unknown cause.  Patient received Benadryl, Pepcid and prednisone in the emergency room.  Patient was observed in the emergency room and reevaluation reveals improvement she states she feels much better.  Patient received prescription for prednisone, Pepcid and Benadryl and I encouraged monitoring symptoms, if they become worse return emergency room for further evaluation, otherwise follow primary care provider as needed.  Patient was agreeable this plan and discharged home in stable condition.    MERCEDEZ Watkins     Portions of this note were generated using digital voice recognition software, and may contain grammatical errors      Procedure  Procedures     MERCEDEZ Watkins  12/18/24 0567

## (undated) DEVICE — SUTURE, VICRYL, 3-0, 27 IN, SH

## (undated) DEVICE — Device

## (undated) DEVICE — GLOVE, SURGICAL, PROTEXIS PI , 6.0, PF, LF

## (undated) DEVICE — DRAPE, SHEET, XL

## (undated) DEVICE — SUTURE, MONOCRYL, 4-0, 27 IN, PS-2, UNDYED

## (undated) DEVICE — STRAP, VELCRO, BODY, 4 X 60IN, NS

## (undated) DEVICE — SYRINGE, 10 CC, LUER LOCK

## (undated) DEVICE — STRIP, SKIN CLOSURE, STERI STRIP, REINFORCED, 0.5 X 4 IN

## (undated) DEVICE — HANDLE, LITE EZ, PLASTIC, DISP, LF

## (undated) DEVICE — ELECTRODE, ELECTROSURGICAL, BLADE, INSULATED, ENT/IMA, STERILE

## (undated) DEVICE — CUP, MEDICINE, GRADUATED, 2 OZ, PLASTIC, DISP, LF

## (undated) DEVICE — BOWL, BASIN, 32 OZ, STERILE

## (undated) DEVICE — DRAPE, SHEET, LAPAROTOMY, W/ISO-BAC, W/ARMBOARD COVERS, 98 X 122 IN, DISPOSABLE, LF, STERILE

## (undated) DEVICE — NEEDLE, SAFETY, 25 GA X 1.5 IN

## (undated) DEVICE — COLLECTION/DELIVERY SYSTEM, COPAN ESWAB, REG SIZE SWAB

## (undated) DEVICE — GLOVE, SURGICAL, PROTEXIS PI , 7.0, PF, LF

## (undated) DEVICE — ADHESIVE, SKIN, LIQUIBAND EXCEED

## (undated) DEVICE — APPLICATOR, CHLORAPREP, W/ORANGE TINT, 26ML

## (undated) DEVICE — SOLUTION, INJECTION, USP, SODIUM CHLORIDE 0.9%, .9 NACL, 250 ML, BAG

## (undated) DEVICE — GOWN, SURGICAL, ROYAL SILK, LG, STERILE

## (undated) DEVICE — GOWN, SURGICAL, ROYAL SILK, XL, STERILE

## (undated) DEVICE — SYRINGE, 10 CC, SLIP TIP

## (undated) DEVICE — REST, HEAD, BAGEL, 9 IN

## (undated) DEVICE — GLOVE, SURGICAL, PROTEXIS PI BLUE W/NEUTHERA, 7.0, PF, LF

## (undated) DEVICE — STRIP, SKIN CLOSURE, COMPOUND BENZION TINCTURE 0.6ML

## (undated) DEVICE — PROTECTOR, NERVE, ULNAR, PINK

## (undated) DEVICE — TOWEL PACK 10-PK

## (undated) DEVICE — BAG, DECANTER

## (undated) DEVICE — SUTURE, PROLENE, 2-0, 36 IN, SH, DA, BLUE